# Patient Record
Sex: FEMALE | Race: BLACK OR AFRICAN AMERICAN | NOT HISPANIC OR LATINO
[De-identification: names, ages, dates, MRNs, and addresses within clinical notes are randomized per-mention and may not be internally consistent; named-entity substitution may affect disease eponyms.]

---

## 2017-09-12 ENCOUNTER — HOSPITAL ENCOUNTER (OUTPATIENT)
Dept: LAB | Age: 27
Setting detail: SPECIMEN
Discharge: HOME | End: 2017-09-12

## 2017-09-12 ENCOUNTER — PRENATAL OFFICE VISIT - HEALTHEAST (OUTPATIENT)
Dept: FAMILY MEDICINE | Facility: CLINIC | Age: 27
End: 2017-09-12

## 2017-09-12 ENCOUNTER — HOSPITAL ENCOUNTER (OUTPATIENT)
Dept: ULTRASOUND IMAGING | Facility: CLINIC | Age: 27
Discharge: HOME | End: 2017-09-12
Attending: PHYSICIAN ASSISTANT

## 2017-09-12 ENCOUNTER — COMMUNICATION - HEALTHEAST (OUTPATIENT)
Dept: TELEHEALTH | Facility: CLINIC | Age: 27
End: 2017-09-12

## 2017-09-12 DIAGNOSIS — Z34.00 NORMAL PREGNANCY, FIRST: ICD-10-CM

## 2017-09-12 DIAGNOSIS — N92.6 ABNORMAL MENSES: ICD-10-CM

## 2017-09-12 DIAGNOSIS — F43.21 ADJUSTMENT DISORDER WITH DEPRESSED MOOD: ICD-10-CM

## 2017-09-12 DIAGNOSIS — Z32.01 POSITIVE PREGNANCY TEST: ICD-10-CM

## 2017-09-12 LAB — HIV 1+2 AB+HIV1 P24 AG SERPL QL IA: NEGATIVE

## 2017-09-12 ASSESSMENT — MIFFLIN-ST. JEOR: SCORE: 1395.35

## 2017-09-13 LAB
HBV SURFACE AG SERPL QL IA: NEGATIVE
HEPATITIS B SURFACE ANTIBODY LHE- HISTORICAL: POSITIVE
SYPHILIS RPR SCREEN - HISTORICAL: NORMAL

## 2017-09-14 ENCOUNTER — COMMUNICATION - HEALTHEAST (OUTPATIENT)
Dept: FAMILY MEDICINE | Facility: CLINIC | Age: 27
End: 2017-09-14

## 2017-09-27 ENCOUNTER — COMMUNICATION - HEALTHEAST (OUTPATIENT)
Dept: FAMILY MEDICINE | Facility: CLINIC | Age: 27
End: 2017-09-27

## 2017-10-06 ENCOUNTER — COMMUNICATION - HEALTHEAST (OUTPATIENT)
Dept: FAMILY MEDICINE | Facility: CLINIC | Age: 27
End: 2017-10-06

## 2017-10-11 ENCOUNTER — PRENATAL OFFICE VISIT - HEALTHEAST (OUTPATIENT)
Dept: FAMILY MEDICINE | Facility: CLINIC | Age: 27
End: 2017-10-11

## 2017-10-11 DIAGNOSIS — N89.8 VAGINAL DISCHARGE DURING PREGNANCY IN SECOND TRIMESTER: ICD-10-CM

## 2017-10-11 DIAGNOSIS — R35.0 FREQUENCY OF URINATION: ICD-10-CM

## 2017-10-11 DIAGNOSIS — O26.892 VAGINAL DISCHARGE DURING PREGNANCY IN SECOND TRIMESTER: ICD-10-CM

## 2017-10-11 DIAGNOSIS — Z34.90 ENCOUNTER FOR SUPERVISION OF NORMAL PREGNANCY: ICD-10-CM

## 2017-10-11 ASSESSMENT — MIFFLIN-ST. JEOR: SCORE: 1429.08

## 2017-10-12 ENCOUNTER — COMMUNICATION - HEALTHEAST (OUTPATIENT)
Dept: BEHAVIORAL HEALTH | Facility: CLINIC | Age: 27
End: 2017-10-12

## 2017-10-12 ENCOUNTER — COMMUNICATION - HEALTHEAST (OUTPATIENT)
Dept: FAMILY MEDICINE | Facility: CLINIC | Age: 27
End: 2017-10-12

## 2017-10-12 DIAGNOSIS — N76.0 BV (BACTERIAL VAGINOSIS): ICD-10-CM

## 2017-10-12 DIAGNOSIS — B96.89 BV (BACTERIAL VAGINOSIS): ICD-10-CM

## 2017-10-12 DIAGNOSIS — B37.31 YEAST VAGINITIS: ICD-10-CM

## 2017-10-13 ENCOUNTER — OFFICE VISIT - HEALTHEAST (OUTPATIENT)
Dept: BEHAVIORAL HEALTH | Facility: CLINIC | Age: 27
End: 2017-10-13

## 2017-10-13 DIAGNOSIS — F43.23 ADJUSTMENT DISORDER WITH MIXED ANXIETY AND DEPRESSED MOOD: ICD-10-CM

## 2017-10-18 ENCOUNTER — HOSPITAL ENCOUNTER (OUTPATIENT)
Dept: ULTRASOUND IMAGING | Facility: CLINIC | Age: 27
Discharge: HOME | End: 2017-10-18

## 2017-10-18 LAB
BKR LAB AP ABNORMAL BLEEDING: NO
BKR LAB AP BIRTH CONTROL/HORMONES: NORMAL
BKR LAB AP CERVICAL APPEARANCE: NORMAL
BKR LAB AP GYN ADEQUACY: NORMAL
BKR LAB AP GYN INTERPRETATION: NORMAL
BKR LAB AP HPV REFLEX: NO
BKR LAB AP LMP: NORMAL
BKR LAB AP PATIENT STATUS: NORMAL
BKR LAB AP PREVIOUS ABNORMAL: NORMAL
BKR LAB AP PREVIOUS NORMAL: 2009
HIGH RISK?: NO
PATH REPORT.COMMENTS IMP SPEC: NORMAL
PATH REPORT.COMMENTS IMP SPEC: NORMAL
RESULT FLAG (HE HISTORICAL CONVERSION): NORMAL

## 2017-10-24 ENCOUNTER — OFFICE VISIT - HEALTHEAST (OUTPATIENT)
Dept: BEHAVIORAL HEALTH | Facility: CLINIC | Age: 27
End: 2017-10-24

## 2017-10-24 DIAGNOSIS — F43.23 ADJUSTMENT DISORDER WITH MIXED ANXIETY AND DEPRESSED MOOD: ICD-10-CM

## 2017-10-30 ENCOUNTER — AMBULATORY - HEALTHEAST (OUTPATIENT)
Dept: BEHAVIORAL HEALTH | Facility: CLINIC | Age: 27
End: 2017-10-30

## 2017-11-01 ENCOUNTER — COMMUNICATION - HEALTHEAST (OUTPATIENT)
Dept: NURSING | Facility: CLINIC | Age: 27
End: 2017-11-01

## 2017-11-02 ENCOUNTER — OFFICE VISIT - HEALTHEAST (OUTPATIENT)
Dept: BEHAVIORAL HEALTH | Facility: CLINIC | Age: 27
End: 2017-11-02

## 2017-11-02 ENCOUNTER — COMMUNICATION - HEALTHEAST (OUTPATIENT)
Dept: NURSING | Facility: CLINIC | Age: 27
End: 2017-11-02

## 2017-11-02 DIAGNOSIS — F43.23 ADJUSTMENT DISORDER WITH MIXED ANXIETY AND DEPRESSED MOOD: ICD-10-CM

## 2017-11-08 ENCOUNTER — PRENATAL OFFICE VISIT - HEALTHEAST (OUTPATIENT)
Dept: FAMILY MEDICINE | Facility: CLINIC | Age: 27
End: 2017-11-08

## 2017-11-08 ENCOUNTER — OFFICE VISIT - HEALTHEAST (OUTPATIENT)
Dept: BEHAVIORAL HEALTH | Facility: CLINIC | Age: 27
End: 2017-11-08

## 2017-11-08 DIAGNOSIS — T76.21XA: ICD-10-CM

## 2017-11-08 DIAGNOSIS — Z3A.23 23 WEEKS GESTATION OF PREGNANCY: ICD-10-CM

## 2017-11-08 DIAGNOSIS — F43.23 ADJUSTMENT DISORDER WITH MIXED ANXIETY AND DEPRESSED MOOD: ICD-10-CM

## 2017-11-08 DIAGNOSIS — F51.04 PSYCHOPHYSIOLOGICAL INSOMNIA: ICD-10-CM

## 2017-11-08 DIAGNOSIS — Z34.92 ENCOUNTER FOR SUPERVISION OF NORMAL PREGNANCY IN SECOND TRIMESTER: ICD-10-CM

## 2017-11-14 ENCOUNTER — COMMUNICATION - HEALTHEAST (OUTPATIENT)
Dept: NURSING | Facility: CLINIC | Age: 27
End: 2017-11-14

## 2017-11-15 ENCOUNTER — COMMUNICATION - HEALTHEAST (OUTPATIENT)
Dept: NURSING | Facility: CLINIC | Age: 27
End: 2017-11-15

## 2017-11-15 ENCOUNTER — AMBULATORY - HEALTHEAST (OUTPATIENT)
Dept: CARE COORDINATION | Facility: CLINIC | Age: 27
End: 2017-11-15

## 2017-11-15 DIAGNOSIS — Z71.89 COUNSELING AND COORDINATION OF CARE: ICD-10-CM

## 2017-11-17 ENCOUNTER — OFFICE VISIT - HEALTHEAST (OUTPATIENT)
Dept: BEHAVIORAL HEALTH | Facility: CLINIC | Age: 27
End: 2017-11-17

## 2017-11-17 DIAGNOSIS — F43.23 ADJUSTMENT DISORDER WITH MIXED ANXIETY AND DEPRESSED MOOD: ICD-10-CM

## 2017-11-20 ENCOUNTER — COMMUNICATION - HEALTHEAST (OUTPATIENT)
Dept: NURSING | Facility: CLINIC | Age: 27
End: 2017-11-20

## 2017-11-21 ENCOUNTER — COMMUNICATION - HEALTHEAST (OUTPATIENT)
Dept: NURSING | Facility: CLINIC | Age: 27
End: 2017-11-21

## 2017-11-21 ENCOUNTER — PRENATAL OFFICE VISIT - HEALTHEAST (OUTPATIENT)
Dept: FAMILY MEDICINE | Facility: CLINIC | Age: 27
End: 2017-11-21

## 2017-11-21 DIAGNOSIS — Z34.92 SECOND TRIMESTER PREGNANCY: ICD-10-CM

## 2017-11-21 DIAGNOSIS — Z71.89 COUNSELING AND COORDINATION OF CARE: ICD-10-CM

## 2017-11-28 ENCOUNTER — COMMUNICATION - HEALTHEAST (OUTPATIENT)
Dept: NURSING | Facility: CLINIC | Age: 27
End: 2017-11-28

## 2017-12-01 ENCOUNTER — OFFICE VISIT - HEALTHEAST (OUTPATIENT)
Dept: BEHAVIORAL HEALTH | Facility: CLINIC | Age: 27
End: 2017-12-01

## 2017-12-01 DIAGNOSIS — F43.23 ADJUSTMENT DISORDER WITH MIXED ANXIETY AND DEPRESSED MOOD: ICD-10-CM

## 2017-12-07 ENCOUNTER — COMMUNICATION - HEALTHEAST (OUTPATIENT)
Dept: NURSING | Facility: CLINIC | Age: 27
End: 2017-12-07

## 2017-12-11 ENCOUNTER — PRENATAL OFFICE VISIT - HEALTHEAST (OUTPATIENT)
Dept: FAMILY MEDICINE | Facility: CLINIC | Age: 27
End: 2017-12-11

## 2017-12-11 ENCOUNTER — COMMUNICATION - HEALTHEAST (OUTPATIENT)
Dept: NURSING | Facility: CLINIC | Age: 27
End: 2017-12-11

## 2017-12-11 DIAGNOSIS — Z34.93 ENCOUNTER FOR SUPERVISION OF NORMAL PREGNANCY IN THIRD TRIMESTER: ICD-10-CM

## 2017-12-12 ENCOUNTER — AMBULATORY - HEALTHEAST (OUTPATIENT)
Dept: CARE COORDINATION | Facility: CLINIC | Age: 27
End: 2017-12-12

## 2017-12-12 LAB — SYPHILIS RPR SCREEN - HISTORICAL: NORMAL

## 2017-12-15 ENCOUNTER — OFFICE VISIT - HEALTHEAST (OUTPATIENT)
Dept: BEHAVIORAL HEALTH | Facility: CLINIC | Age: 27
End: 2017-12-15

## 2017-12-15 DIAGNOSIS — F43.10 PTSD (POST-TRAUMATIC STRESS DISORDER): ICD-10-CM

## 2017-12-27 ENCOUNTER — COMMUNICATION - HEALTHEAST (OUTPATIENT)
Dept: NURSING | Facility: CLINIC | Age: 27
End: 2017-12-27

## 2017-12-27 ENCOUNTER — PRENATAL OFFICE VISIT - HEALTHEAST (OUTPATIENT)
Dept: FAMILY MEDICINE | Facility: CLINIC | Age: 27
End: 2017-12-27

## 2017-12-27 DIAGNOSIS — Z34.03 ENCOUNTER FOR SUPERVISION OF NORMAL FIRST PREGNANCY IN THIRD TRIMESTER: ICD-10-CM

## 2017-12-28 ENCOUNTER — COMMUNICATION - HEALTHEAST (OUTPATIENT)
Dept: FAMILY MEDICINE | Facility: CLINIC | Age: 27
End: 2017-12-28

## 2017-12-29 ENCOUNTER — AMBULATORY - HEALTHEAST (OUTPATIENT)
Dept: NURSING | Facility: CLINIC | Age: 27
End: 2017-12-29

## 2017-12-29 ENCOUNTER — OFFICE VISIT - HEALTHEAST (OUTPATIENT)
Dept: BEHAVIORAL HEALTH | Facility: CLINIC | Age: 27
End: 2017-12-29

## 2017-12-29 DIAGNOSIS — F43.10 PTSD (POST-TRAUMATIC STRESS DISORDER): ICD-10-CM

## 2017-12-29 DIAGNOSIS — F43.23 ADJUSTMENT DISORDER WITH MIXED ANXIETY AND DEPRESSED MOOD: ICD-10-CM

## 2018-01-10 ENCOUNTER — COMMUNICATION - HEALTHEAST (OUTPATIENT)
Dept: NURSING | Facility: CLINIC | Age: 28
End: 2018-01-10

## 2018-01-10 DIAGNOSIS — Z71.89 COUNSELING AND COORDINATION OF CARE: ICD-10-CM

## 2018-01-12 ENCOUNTER — OFFICE VISIT - HEALTHEAST (OUTPATIENT)
Dept: BEHAVIORAL HEALTH | Facility: CLINIC | Age: 28
End: 2018-01-12

## 2018-01-12 ENCOUNTER — PRENATAL OFFICE VISIT - HEALTHEAST (OUTPATIENT)
Dept: FAMILY MEDICINE | Facility: CLINIC | Age: 28
End: 2018-01-12

## 2018-01-12 DIAGNOSIS — F43.10 PTSD (POST-TRAUMATIC STRESS DISORDER): ICD-10-CM

## 2018-01-12 DIAGNOSIS — F51.04 PSYCHOPHYSIOLOGICAL INSOMNIA: ICD-10-CM

## 2018-01-12 DIAGNOSIS — Z34.93 THIRD TRIMESTER PREGNANCY: ICD-10-CM

## 2018-01-12 DIAGNOSIS — Z3A.23 23 WEEKS GESTATION OF PREGNANCY: ICD-10-CM

## 2018-01-12 DIAGNOSIS — F43.23 ADJUSTMENT DISORDER WITH MIXED ANXIETY AND DEPRESSED MOOD: ICD-10-CM

## 2018-01-12 LAB
ALBUMIN UR-MCNC: ABNORMAL MG/DL
GLUCOSE UR STRIP-MCNC: NEGATIVE MG/DL
KETONES UR STRIP-MCNC: NEGATIVE MG/DL

## 2018-01-19 ENCOUNTER — COMMUNICATION - HEALTHEAST (OUTPATIENT)
Dept: NURSING | Facility: CLINIC | Age: 28
End: 2018-01-19

## 2018-01-19 ENCOUNTER — COMMUNICATION - HEALTHEAST (OUTPATIENT)
Dept: FAMILY MEDICINE | Facility: CLINIC | Age: 28
End: 2018-01-19

## 2018-01-23 ENCOUNTER — COMMUNICATION - HEALTHEAST (OUTPATIENT)
Dept: FAMILY MEDICINE | Facility: CLINIC | Age: 28
End: 2018-01-23

## 2018-01-23 ENCOUNTER — COMMUNICATION - HEALTHEAST (OUTPATIENT)
Dept: BEHAVIORAL HEALTH | Facility: CLINIC | Age: 28
End: 2018-01-23

## 2018-01-31 ENCOUNTER — PRENATAL OFFICE VISIT - HEALTHEAST (OUTPATIENT)
Dept: FAMILY MEDICINE | Facility: CLINIC | Age: 28
End: 2018-01-31

## 2018-01-31 DIAGNOSIS — Z34.90 SUPERVISION OF NORMAL PREGNANCY: ICD-10-CM

## 2018-01-31 LAB
ALBUMIN UR-MCNC: ABNORMAL MG/DL
APPEARANCE UR: CLEAR
BILIRUB UR QL STRIP: NEGATIVE
COLOR UR AUTO: YELLOW
GLUCOSE UR STRIP-MCNC: NEGATIVE MG/DL
HGB UR QL STRIP: ABNORMAL
KETONES UR STRIP-MCNC: NEGATIVE MG/DL
LEUKOCYTE ESTERASE UR QL STRIP: ABNORMAL
NITRATE UR QL: NEGATIVE
PH UR STRIP: 8.5 [PH] (ref 5–8)
SP GR UR STRIP: 1.01 (ref 1–1.03)
UROBILINOGEN UR STRIP-ACNC: ABNORMAL

## 2018-02-01 LAB
ALLERGIC TO PENICILLIN: NO
GP B STREP DNA SPEC QL NAA+PROBE: NEGATIVE

## 2018-02-06 ENCOUNTER — COMMUNICATION - HEALTHEAST (OUTPATIENT)
Dept: NURSING | Facility: CLINIC | Age: 28
End: 2018-02-06

## 2018-02-09 ENCOUNTER — COMMUNICATION - HEALTHEAST (OUTPATIENT)
Dept: NURSING | Facility: CLINIC | Age: 28
End: 2018-02-09

## 2018-02-09 ENCOUNTER — OFFICE VISIT - HEALTHEAST (OUTPATIENT)
Dept: BEHAVIORAL HEALTH | Facility: CLINIC | Age: 28
End: 2018-02-09

## 2018-02-09 ENCOUNTER — AMBULATORY - HEALTHEAST (OUTPATIENT)
Dept: BEHAVIORAL HEALTH | Facility: CLINIC | Age: 28
End: 2018-02-09

## 2018-02-09 DIAGNOSIS — F43.10 PTSD (POST-TRAUMATIC STRESS DISORDER): ICD-10-CM

## 2018-02-16 ENCOUNTER — PRENATAL OFFICE VISIT - HEALTHEAST (OUTPATIENT)
Dept: FAMILY MEDICINE | Facility: CLINIC | Age: 28
End: 2018-02-16

## 2018-02-16 DIAGNOSIS — Z34.03 ENCOUNTER FOR SUPERVISION OF NORMAL FIRST PREGNANCY IN THIRD TRIMESTER: ICD-10-CM

## 2018-02-16 LAB
ALBUMIN UR-MCNC: NEGATIVE MG/DL
GLUCOSE UR STRIP-MCNC: NEGATIVE MG/DL
KETONES UR STRIP-MCNC: NEGATIVE MG/DL

## 2018-02-20 ENCOUNTER — HOSPITAL ENCOUNTER (OUTPATIENT)
Dept: ULTRASOUND IMAGING | Facility: CLINIC | Age: 28
Discharge: HOME | End: 2018-02-20
Attending: FAMILY MEDICINE

## 2018-02-21 ENCOUNTER — AMBULATORY - HEALTHEAST (OUTPATIENT)
Dept: NURSING | Facility: CLINIC | Age: 28
End: 2018-02-21

## 2018-02-21 ENCOUNTER — PRENATAL OFFICE VISIT - HEALTHEAST (OUTPATIENT)
Dept: FAMILY MEDICINE | Facility: CLINIC | Age: 28
End: 2018-02-21

## 2018-02-21 ENCOUNTER — OFFICE VISIT - HEALTHEAST (OUTPATIENT)
Dept: BEHAVIORAL HEALTH | Facility: CLINIC | Age: 28
End: 2018-02-21

## 2018-02-21 DIAGNOSIS — F43.10 PTSD (POST-TRAUMATIC STRESS DISORDER): ICD-10-CM

## 2018-02-21 DIAGNOSIS — Z34.03 ENCOUNTER FOR SUPERVISION OF NORMAL FIRST PREGNANCY IN THIRD TRIMESTER: ICD-10-CM

## 2018-02-23 ENCOUNTER — COMMUNICATION - HEALTHEAST (OUTPATIENT)
Dept: CARE COORDINATION | Facility: CLINIC | Age: 28
End: 2018-02-23

## 2018-02-28 ENCOUNTER — COMMUNICATION - HEALTHEAST (OUTPATIENT)
Dept: NURSING | Facility: CLINIC | Age: 28
End: 2018-02-28

## 2018-02-28 ENCOUNTER — OFFICE VISIT - HEALTHEAST (OUTPATIENT)
Dept: BEHAVIORAL HEALTH | Facility: CLINIC | Age: 28
End: 2018-02-28

## 2018-02-28 ENCOUNTER — PRENATAL OFFICE VISIT - HEALTHEAST (OUTPATIENT)
Dept: FAMILY MEDICINE | Facility: CLINIC | Age: 28
End: 2018-02-28

## 2018-02-28 DIAGNOSIS — F32.9 SINGLE CURRENT EPISODE OF MAJOR DEPRESSIVE DISORDER, UNSPECIFIED DEPRESSION EPISODE SEVERITY: ICD-10-CM

## 2018-02-28 DIAGNOSIS — F43.10 PTSD (POST-TRAUMATIC STRESS DISORDER): ICD-10-CM

## 2018-02-28 DIAGNOSIS — Z34.03 ENCOUNTER FOR SUPERVISION OF NORMAL FIRST PREGNANCY IN THIRD TRIMESTER: ICD-10-CM

## 2018-03-07 ENCOUNTER — HOSPITAL ENCOUNTER (OUTPATIENT)
Dept: OBGYN | Facility: HOSPITAL | Age: 28
Discharge: HOME | End: 2018-03-07
Attending: FAMILY MEDICINE | Admitting: FAMILY MEDICINE

## 2018-03-07 ENCOUNTER — OFFICE VISIT - HEALTHEAST (OUTPATIENT)
Dept: BEHAVIORAL HEALTH | Facility: CLINIC | Age: 28
End: 2018-03-07

## 2018-03-07 ENCOUNTER — COMMUNICATION - HEALTHEAST (OUTPATIENT)
Dept: NURSING | Facility: CLINIC | Age: 28
End: 2018-03-07

## 2018-03-07 ENCOUNTER — PRENATAL OFFICE VISIT - HEALTHEAST (OUTPATIENT)
Dept: FAMILY MEDICINE | Facility: CLINIC | Age: 28
End: 2018-03-07

## 2018-03-07 DIAGNOSIS — F32.9 SINGLE CURRENT EPISODE OF MAJOR DEPRESSIVE DISORDER, UNSPECIFIED DEPRESSION EPISODE SEVERITY: ICD-10-CM

## 2018-03-07 DIAGNOSIS — Z34.03 ENCOUNTER FOR SUPERVISION OF NORMAL FIRST PREGNANCY IN THIRD TRIMESTER: ICD-10-CM

## 2018-03-07 DIAGNOSIS — F43.10 PTSD (POST-TRAUMATIC STRESS DISORDER): ICD-10-CM

## 2018-03-07 ASSESSMENT — MIFFLIN-ST. JEOR: SCORE: 1554.11

## 2018-03-08 ENCOUNTER — COMMUNICATION - HEALTHEAST (OUTPATIENT)
Dept: NURSING | Facility: CLINIC | Age: 28
End: 2018-03-08

## 2018-03-08 ENCOUNTER — COMMUNICATION - HEALTHEAST (OUTPATIENT)
Dept: FAMILY MEDICINE | Facility: CLINIC | Age: 28
End: 2018-03-08

## 2018-03-13 ENCOUNTER — HOSPITAL ENCOUNTER (OUTPATIENT)
Dept: OBGYN | Facility: HOSPITAL | Age: 28
Discharge: HOME | End: 2018-03-13
Attending: FAMILY MEDICINE | Admitting: FAMILY MEDICINE

## 2018-03-13 ENCOUNTER — HOSPITAL ENCOUNTER (OUTPATIENT)
Dept: ULTRASOUND IMAGING | Facility: HOSPITAL | Age: 28
Discharge: HOME | End: 2018-03-13
Attending: FAMILY MEDICINE

## 2018-03-14 ENCOUNTER — COMMUNICATION - HEALTHEAST (OUTPATIENT)
Dept: CARE COORDINATION | Facility: CLINIC | Age: 28
End: 2018-03-14

## 2018-03-14 ENCOUNTER — PRENATAL OFFICE VISIT - HEALTHEAST (OUTPATIENT)
Dept: FAMILY MEDICINE | Facility: CLINIC | Age: 28
End: 2018-03-14

## 2018-03-14 DIAGNOSIS — Z34.93 THIRD TRIMESTER PREGNANCY: ICD-10-CM

## 2018-03-16 ENCOUNTER — HOSPITAL ENCOUNTER (OUTPATIENT)
Dept: ULTRASOUND IMAGING | Facility: HOSPITAL | Age: 28
Discharge: HOME | End: 2018-03-16
Attending: FAMILY MEDICINE

## 2018-03-16 ENCOUNTER — HOSPITAL ENCOUNTER (OUTPATIENT)
Dept: OBGYN | Facility: HOSPITAL | Age: 28
Discharge: HOME | End: 2018-03-16
Attending: FAMILY MEDICINE | Admitting: FAMILY MEDICINE

## 2018-03-16 DIAGNOSIS — Z34.93 THIRD TRIMESTER PREGNANCY: ICD-10-CM

## 2018-03-19 ENCOUNTER — AMBULATORY - HEALTHEAST (OUTPATIENT)
Dept: NURSING | Facility: CLINIC | Age: 28
End: 2018-03-19

## 2018-03-22 ENCOUNTER — HOME CARE/HOSPICE - HEALTHEAST (OUTPATIENT)
Dept: HOME HEALTH SERVICES | Facility: HOME HEALTH | Age: 28
End: 2018-03-22

## 2018-03-23 ENCOUNTER — AMBULATORY - HEALTHEAST (OUTPATIENT)
Dept: NURSING | Facility: CLINIC | Age: 28
End: 2018-03-23

## 2018-03-23 ENCOUNTER — COMMUNICATION - HEALTHEAST (OUTPATIENT)
Dept: NURSING | Facility: CLINIC | Age: 28
End: 2018-03-23

## 2018-03-24 ENCOUNTER — HOME CARE/HOSPICE - HEALTHEAST (OUTPATIENT)
Dept: HOME HEALTH SERVICES | Facility: HOME HEALTH | Age: 28
End: 2018-03-24

## 2018-03-27 ENCOUNTER — COMMUNICATION - HEALTHEAST (OUTPATIENT)
Dept: FAMILY MEDICINE | Facility: CLINIC | Age: 28
End: 2018-03-27

## 2018-03-28 ENCOUNTER — OFFICE VISIT - HEALTHEAST (OUTPATIENT)
Dept: BEHAVIORAL HEALTH | Facility: CLINIC | Age: 28
End: 2018-03-28

## 2018-03-28 ENCOUNTER — OFFICE VISIT - HEALTHEAST (OUTPATIENT)
Dept: FAMILY MEDICINE | Facility: CLINIC | Age: 28
End: 2018-03-28

## 2018-03-28 DIAGNOSIS — F43.10 PTSD (POST-TRAUMATIC STRESS DISORDER): ICD-10-CM

## 2018-03-28 DIAGNOSIS — G56.00 CARPAL TUNNEL SYNDROME: ICD-10-CM

## 2018-03-28 DIAGNOSIS — N76.0 VAGINITIS: ICD-10-CM

## 2018-03-28 LAB
CLUE CELLS: NORMAL
TRICHOMONAS, WET PREP: NORMAL
YEAST, WET PREP: NORMAL

## 2018-03-28 ASSESSMENT — MIFFLIN-ST. JEOR: SCORE: 1517.82

## 2018-03-29 LAB
C TRACH DNA SPEC QL PROBE+SIG AMP: NEGATIVE
N GONORRHOEA DNA SPEC QL NAA+PROBE: NEGATIVE

## 2018-03-31 LAB — BACTERIA SPEC CULT: NORMAL

## 2018-04-06 ENCOUNTER — RECORDS - HEALTHEAST (OUTPATIENT)
Dept: ADMINISTRATIVE | Facility: OTHER | Age: 28
End: 2018-04-06

## 2018-04-11 ENCOUNTER — COMMUNICATION - HEALTHEAST (OUTPATIENT)
Dept: FAMILY MEDICINE | Facility: CLINIC | Age: 28
End: 2018-04-11

## 2018-04-18 ENCOUNTER — COMMUNICATION - HEALTHEAST (OUTPATIENT)
Dept: NURSING | Facility: CLINIC | Age: 28
End: 2018-04-18

## 2018-04-20 ENCOUNTER — COMMUNICATION - HEALTHEAST (OUTPATIENT)
Dept: NURSING | Facility: CLINIC | Age: 28
End: 2018-04-20

## 2018-04-20 ENCOUNTER — AMBULATORY - HEALTHEAST (OUTPATIENT)
Dept: NURSING | Facility: CLINIC | Age: 28
End: 2018-04-20

## 2018-04-20 ENCOUNTER — OFFICE VISIT - HEALTHEAST (OUTPATIENT)
Dept: FAMILY MEDICINE | Facility: CLINIC | Age: 28
End: 2018-04-20

## 2018-04-20 ENCOUNTER — OFFICE VISIT - HEALTHEAST (OUTPATIENT)
Dept: BEHAVIORAL HEALTH | Facility: CLINIC | Age: 28
End: 2018-04-20

## 2018-04-20 DIAGNOSIS — G56.03 BILATERAL CARPAL TUNNEL SYNDROME: ICD-10-CM

## 2018-04-20 DIAGNOSIS — F32.9 SINGLE CURRENT EPISODE OF MAJOR DEPRESSIVE DISORDER, UNSPECIFIED DEPRESSION EPISODE SEVERITY: ICD-10-CM

## 2018-04-20 DIAGNOSIS — F43.10 PTSD (POST-TRAUMATIC STRESS DISORDER): ICD-10-CM

## 2018-04-20 ASSESSMENT — MIFFLIN-ST. JEOR: SCORE: 1490.6

## 2018-04-24 ENCOUNTER — AMBULATORY - HEALTHEAST (OUTPATIENT)
Dept: NURSING | Facility: CLINIC | Age: 28
End: 2018-04-24

## 2018-05-09 ENCOUNTER — COMMUNICATION - HEALTHEAST (OUTPATIENT)
Dept: FAMILY MEDICINE | Facility: CLINIC | Age: 28
End: 2018-05-09

## 2018-05-11 ENCOUNTER — OFFICE VISIT - HEALTHEAST (OUTPATIENT)
Dept: BEHAVIORAL HEALTH | Facility: CLINIC | Age: 28
End: 2018-05-11

## 2018-05-11 ENCOUNTER — OFFICE VISIT - HEALTHEAST (OUTPATIENT)
Dept: FAMILY MEDICINE | Facility: CLINIC | Age: 28
End: 2018-05-11

## 2018-05-11 ENCOUNTER — COMMUNICATION - HEALTHEAST (OUTPATIENT)
Dept: NURSING | Facility: CLINIC | Age: 28
End: 2018-05-11

## 2018-05-11 ENCOUNTER — AMBULATORY - HEALTHEAST (OUTPATIENT)
Dept: BEHAVIORAL HEALTH | Facility: CLINIC | Age: 28
End: 2018-05-11

## 2018-05-11 DIAGNOSIS — F43.10 PTSD (POST-TRAUMATIC STRESS DISORDER): ICD-10-CM

## 2018-05-11 ASSESSMENT — MIFFLIN-ST. JEOR: SCORE: 1477.88

## 2018-05-17 ENCOUNTER — COMMUNICATION - HEALTHEAST (OUTPATIENT)
Dept: FAMILY MEDICINE | Facility: CLINIC | Age: 28
End: 2018-05-17

## 2018-05-25 ENCOUNTER — COMMUNICATION - HEALTHEAST (OUTPATIENT)
Dept: NURSING | Facility: CLINIC | Age: 28
End: 2018-05-25

## 2018-05-31 ENCOUNTER — OFFICE VISIT - HEALTHEAST (OUTPATIENT)
Dept: BEHAVIORAL HEALTH | Facility: CLINIC | Age: 28
End: 2018-05-31

## 2018-05-31 ENCOUNTER — COMMUNICATION - HEALTHEAST (OUTPATIENT)
Dept: NURSING | Facility: CLINIC | Age: 28
End: 2018-05-31

## 2018-05-31 DIAGNOSIS — F32.9 SINGLE CURRENT EPISODE OF MAJOR DEPRESSIVE DISORDER, UNSPECIFIED DEPRESSION EPISODE SEVERITY: ICD-10-CM

## 2018-05-31 DIAGNOSIS — F43.10 PTSD (POST-TRAUMATIC STRESS DISORDER): ICD-10-CM

## 2018-05-31 DIAGNOSIS — Z71.89 COUNSELING AND COORDINATION OF CARE: ICD-10-CM

## 2018-06-13 ENCOUNTER — AMBULATORY - HEALTHEAST (OUTPATIENT)
Dept: BEHAVIORAL HEALTH | Facility: CLINIC | Age: 28
End: 2018-06-13

## 2018-06-14 ENCOUNTER — COMMUNICATION - HEALTHEAST (OUTPATIENT)
Dept: NURSING | Facility: CLINIC | Age: 28
End: 2018-06-14

## 2018-06-14 ENCOUNTER — OFFICE VISIT - HEALTHEAST (OUTPATIENT)
Dept: BEHAVIORAL HEALTH | Facility: CLINIC | Age: 28
End: 2018-06-14

## 2018-06-14 DIAGNOSIS — F43.10 PTSD (POST-TRAUMATIC STRESS DISORDER): ICD-10-CM

## 2018-06-14 DIAGNOSIS — F32.9 SINGLE CURRENT EPISODE OF MAJOR DEPRESSIVE DISORDER, UNSPECIFIED DEPRESSION EPISODE SEVERITY: ICD-10-CM

## 2018-06-21 ENCOUNTER — COMMUNICATION - HEALTHEAST (OUTPATIENT)
Dept: NURSING | Facility: CLINIC | Age: 28
End: 2018-06-21

## 2018-06-21 ENCOUNTER — COMMUNICATION - HEALTHEAST (OUTPATIENT)
Dept: FAMILY MEDICINE | Facility: CLINIC | Age: 28
End: 2018-06-21

## 2018-06-29 ENCOUNTER — OFFICE VISIT - HEALTHEAST (OUTPATIENT)
Dept: BEHAVIORAL HEALTH | Facility: CLINIC | Age: 28
End: 2018-06-29

## 2018-06-29 ENCOUNTER — COMMUNICATION - HEALTHEAST (OUTPATIENT)
Dept: NURSING | Facility: CLINIC | Age: 28
End: 2018-06-29

## 2018-06-29 DIAGNOSIS — F32.9 SINGLE CURRENT EPISODE OF MAJOR DEPRESSIVE DISORDER, UNSPECIFIED DEPRESSION EPISODE SEVERITY: ICD-10-CM

## 2018-06-29 DIAGNOSIS — F43.10 PTSD (POST-TRAUMATIC STRESS DISORDER): ICD-10-CM

## 2018-07-23 ENCOUNTER — COMMUNICATION - HEALTHEAST (OUTPATIENT)
Dept: NURSING | Facility: CLINIC | Age: 28
End: 2018-07-23

## 2018-07-23 ENCOUNTER — OFFICE VISIT - HEALTHEAST (OUTPATIENT)
Dept: BEHAVIORAL HEALTH | Facility: CLINIC | Age: 28
End: 2018-07-23

## 2018-07-23 DIAGNOSIS — F32.1 MODERATE SINGLE CURRENT EPISODE OF MAJOR DEPRESSIVE DISORDER (H): ICD-10-CM

## 2018-07-23 DIAGNOSIS — F43.10 PTSD (POST-TRAUMATIC STRESS DISORDER): ICD-10-CM

## 2018-07-23 DIAGNOSIS — Z71.89 COUNSELING AND COORDINATION OF CARE: ICD-10-CM

## 2018-08-06 ENCOUNTER — OFFICE VISIT - HEALTHEAST (OUTPATIENT)
Dept: BEHAVIORAL HEALTH | Facility: CLINIC | Age: 28
End: 2018-08-06

## 2018-08-06 ENCOUNTER — COMMUNICATION - HEALTHEAST (OUTPATIENT)
Dept: NURSING | Facility: CLINIC | Age: 28
End: 2018-08-06

## 2018-08-06 DIAGNOSIS — F32.1 MODERATE SINGLE CURRENT EPISODE OF MAJOR DEPRESSIVE DISORDER (H): ICD-10-CM

## 2018-08-06 DIAGNOSIS — F43.10 PTSD (POST-TRAUMATIC STRESS DISORDER): ICD-10-CM

## 2018-08-06 DIAGNOSIS — Z71.89 COUNSELING AND COORDINATION OF CARE: ICD-10-CM

## 2018-08-13 ENCOUNTER — COMMUNICATION - HEALTHEAST (OUTPATIENT)
Dept: CARE COORDINATION | Facility: CLINIC | Age: 28
End: 2018-08-13

## 2018-08-20 ENCOUNTER — AMBULATORY - HEALTHEAST (OUTPATIENT)
Dept: BEHAVIORAL HEALTH | Facility: CLINIC | Age: 28
End: 2018-08-20

## 2018-08-20 ENCOUNTER — OFFICE VISIT - HEALTHEAST (OUTPATIENT)
Dept: BEHAVIORAL HEALTH | Facility: CLINIC | Age: 28
End: 2018-08-20

## 2018-08-20 DIAGNOSIS — F43.10 PTSD (POST-TRAUMATIC STRESS DISORDER): ICD-10-CM

## 2018-08-20 DIAGNOSIS — F32.1 MODERATE SINGLE CURRENT EPISODE OF MAJOR DEPRESSIVE DISORDER (H): ICD-10-CM

## 2018-08-30 ENCOUNTER — COMMUNICATION - HEALTHEAST (OUTPATIENT)
Dept: NURSING | Facility: CLINIC | Age: 28
End: 2018-08-30

## 2018-09-13 ENCOUNTER — COMMUNICATION - HEALTHEAST (OUTPATIENT)
Dept: NURSING | Facility: CLINIC | Age: 28
End: 2018-09-13

## 2018-09-14 ENCOUNTER — AMBULATORY - HEALTHEAST (OUTPATIENT)
Dept: BEHAVIORAL HEALTH | Facility: CLINIC | Age: 28
End: 2018-09-14

## 2018-10-02 ENCOUNTER — COMMUNICATION - HEALTHEAST (OUTPATIENT)
Dept: NURSING | Facility: CLINIC | Age: 28
End: 2018-10-02

## 2018-11-13 ENCOUNTER — COMMUNICATION - HEALTHEAST (OUTPATIENT)
Dept: NURSING | Facility: CLINIC | Age: 28
End: 2018-11-13

## 2018-11-14 ENCOUNTER — COMMUNICATION - HEALTHEAST (OUTPATIENT)
Dept: NURSING | Facility: CLINIC | Age: 28
End: 2018-11-14

## 2018-11-14 ENCOUNTER — AMBULATORY - HEALTHEAST (OUTPATIENT)
Dept: CARE COORDINATION | Facility: CLINIC | Age: 28
End: 2018-11-14

## 2018-11-14 ENCOUNTER — OFFICE VISIT - HEALTHEAST (OUTPATIENT)
Dept: BEHAVIORAL HEALTH | Facility: CLINIC | Age: 28
End: 2018-11-14

## 2018-11-14 DIAGNOSIS — F32.1 MODERATE SINGLE CURRENT EPISODE OF MAJOR DEPRESSIVE DISORDER (H): ICD-10-CM

## 2018-11-14 DIAGNOSIS — F43.10 PTSD (POST-TRAUMATIC STRESS DISORDER): ICD-10-CM

## 2018-11-14 DIAGNOSIS — Z63.4 BEREAVEMENT, UNCOMPLICATED: ICD-10-CM

## 2018-11-14 SDOH — SOCIAL STABILITY - SOCIAL INSECURITY: DISSAPEARANCE AND DEATH OF FAMILY MEMBER: Z63.4

## 2018-12-05 ENCOUNTER — COMMUNICATION - HEALTHEAST (OUTPATIENT)
Dept: NURSING | Facility: CLINIC | Age: 28
End: 2018-12-05

## 2018-12-06 ENCOUNTER — AMBULATORY - HEALTHEAST (OUTPATIENT)
Dept: BEHAVIORAL HEALTH | Facility: CLINIC | Age: 28
End: 2018-12-06

## 2018-12-06 ENCOUNTER — OFFICE VISIT - HEALTHEAST (OUTPATIENT)
Dept: BEHAVIORAL HEALTH | Facility: CLINIC | Age: 28
End: 2018-12-06

## 2018-12-06 DIAGNOSIS — F32.1 MODERATE SINGLE CURRENT EPISODE OF MAJOR DEPRESSIVE DISORDER (H): ICD-10-CM

## 2018-12-06 DIAGNOSIS — F43.10 PTSD (POST-TRAUMATIC STRESS DISORDER): ICD-10-CM

## 2018-12-19 ENCOUNTER — OFFICE VISIT - HEALTHEAST (OUTPATIENT)
Dept: BEHAVIORAL HEALTH | Facility: CLINIC | Age: 28
End: 2018-12-19

## 2018-12-19 DIAGNOSIS — F43.10 PTSD (POST-TRAUMATIC STRESS DISORDER): ICD-10-CM

## 2018-12-19 DIAGNOSIS — F32.1 MODERATE SINGLE CURRENT EPISODE OF MAJOR DEPRESSIVE DISORDER (H): ICD-10-CM

## 2018-12-21 ENCOUNTER — COMMUNICATION - HEALTHEAST (OUTPATIENT)
Dept: NURSING | Facility: CLINIC | Age: 28
End: 2018-12-21

## 2019-01-23 ENCOUNTER — COMMUNICATION - HEALTHEAST (OUTPATIENT)
Dept: NURSING | Facility: CLINIC | Age: 29
End: 2019-01-23

## 2019-01-24 ENCOUNTER — COMMUNICATION - HEALTHEAST (OUTPATIENT)
Dept: NURSING | Facility: CLINIC | Age: 29
End: 2019-01-24

## 2019-01-31 ENCOUNTER — RECORDS - HEALTHEAST (OUTPATIENT)
Dept: ADMINISTRATIVE | Facility: OTHER | Age: 29
End: 2019-01-31

## 2019-02-01 ENCOUNTER — COMMUNICATION - HEALTHEAST (OUTPATIENT)
Dept: NURSING | Facility: CLINIC | Age: 29
End: 2019-02-01

## 2019-02-11 ENCOUNTER — COMMUNICATION - HEALTHEAST (OUTPATIENT)
Dept: NURSING | Facility: CLINIC | Age: 29
End: 2019-02-11

## 2019-03-06 ENCOUNTER — AMBULATORY - HEALTHEAST (OUTPATIENT)
Dept: BEHAVIORAL HEALTH | Facility: CLINIC | Age: 29
End: 2019-03-06

## 2019-03-06 ENCOUNTER — OFFICE VISIT - HEALTHEAST (OUTPATIENT)
Dept: BEHAVIORAL HEALTH | Facility: CLINIC | Age: 29
End: 2019-03-06

## 2019-03-06 DIAGNOSIS — F32.1 MODERATE SINGLE CURRENT EPISODE OF MAJOR DEPRESSIVE DISORDER (H): ICD-10-CM

## 2019-03-06 DIAGNOSIS — F43.10 PTSD (POST-TRAUMATIC STRESS DISORDER): ICD-10-CM

## 2019-03-21 ENCOUNTER — COMMUNICATION - HEALTHEAST (OUTPATIENT)
Dept: NURSING | Facility: CLINIC | Age: 29
End: 2019-03-21

## 2019-03-27 ENCOUNTER — OFFICE VISIT - HEALTHEAST (OUTPATIENT)
Dept: BEHAVIORAL HEALTH | Facility: CLINIC | Age: 29
End: 2019-03-27

## 2019-03-27 ENCOUNTER — COMMUNICATION - HEALTHEAST (OUTPATIENT)
Dept: BEHAVIORAL HEALTH | Facility: CLINIC | Age: 29
End: 2019-03-27

## 2019-03-27 DIAGNOSIS — F32.1 MODERATE SINGLE CURRENT EPISODE OF MAJOR DEPRESSIVE DISORDER (H): ICD-10-CM

## 2019-03-27 DIAGNOSIS — F43.10 PTSD (POST-TRAUMATIC STRESS DISORDER): ICD-10-CM

## 2019-04-17 ENCOUNTER — OFFICE VISIT - HEALTHEAST (OUTPATIENT)
Dept: BEHAVIORAL HEALTH | Facility: CLINIC | Age: 29
End: 2019-04-17

## 2019-04-17 DIAGNOSIS — F32.1 MODERATE SINGLE CURRENT EPISODE OF MAJOR DEPRESSIVE DISORDER (H): ICD-10-CM

## 2019-04-17 DIAGNOSIS — F43.10 PTSD (POST-TRAUMATIC STRESS DISORDER): ICD-10-CM

## 2019-04-23 ENCOUNTER — COMMUNICATION - HEALTHEAST (OUTPATIENT)
Dept: NURSING | Facility: CLINIC | Age: 29
End: 2019-04-23

## 2019-05-02 ENCOUNTER — COMMUNICATION - HEALTHEAST (OUTPATIENT)
Dept: NURSING | Facility: CLINIC | Age: 29
End: 2019-05-02

## 2019-05-08 ENCOUNTER — COMMUNICATION - HEALTHEAST (OUTPATIENT)
Dept: NURSING | Facility: CLINIC | Age: 29
End: 2019-05-08

## 2019-05-08 ENCOUNTER — OFFICE VISIT - HEALTHEAST (OUTPATIENT)
Dept: BEHAVIORAL HEALTH | Facility: CLINIC | Age: 29
End: 2019-05-08

## 2019-05-08 DIAGNOSIS — F32.1 MODERATE SINGLE CURRENT EPISODE OF MAJOR DEPRESSIVE DISORDER (H): ICD-10-CM

## 2019-05-08 DIAGNOSIS — F43.10 PTSD (POST-TRAUMATIC STRESS DISORDER): ICD-10-CM

## 2019-05-13 ENCOUNTER — COMMUNICATION - HEALTHEAST (OUTPATIENT)
Dept: NURSING | Facility: CLINIC | Age: 29
End: 2019-05-13

## 2019-05-15 ENCOUNTER — COMMUNICATION - HEALTHEAST (OUTPATIENT)
Dept: NURSING | Facility: CLINIC | Age: 29
End: 2019-05-15

## 2019-05-29 ENCOUNTER — COMMUNICATION - HEALTHEAST (OUTPATIENT)
Dept: NURSING | Facility: CLINIC | Age: 29
End: 2019-05-29

## 2019-06-17 ENCOUNTER — COMMUNICATION - HEALTHEAST (OUTPATIENT)
Dept: NURSING | Facility: CLINIC | Age: 29
End: 2019-06-17

## 2019-07-10 ENCOUNTER — COMMUNICATION - HEALTHEAST (OUTPATIENT)
Dept: TELEHEALTH | Facility: CLINIC | Age: 29
End: 2019-07-10

## 2019-07-17 ENCOUNTER — COMMUNICATION - HEALTHEAST (OUTPATIENT)
Dept: NURSING | Facility: CLINIC | Age: 29
End: 2019-07-17

## 2019-07-29 ENCOUNTER — COMMUNICATION - HEALTHEAST (OUTPATIENT)
Dept: NURSING | Facility: CLINIC | Age: 29
End: 2019-07-29

## 2019-08-28 ENCOUNTER — COMMUNICATION - HEALTHEAST (OUTPATIENT)
Dept: NURSING | Facility: CLINIC | Age: 29
End: 2019-08-28

## 2019-09-04 ENCOUNTER — COMMUNICATION - HEALTHEAST (OUTPATIENT)
Dept: CARE COORDINATION | Facility: CLINIC | Age: 29
End: 2019-09-04

## 2019-09-09 ENCOUNTER — COMMUNICATION - HEALTHEAST (OUTPATIENT)
Dept: NURSING | Facility: CLINIC | Age: 29
End: 2019-09-09

## 2019-09-18 ENCOUNTER — OFFICE VISIT - HEALTHEAST (OUTPATIENT)
Dept: FAMILY MEDICINE | Facility: CLINIC | Age: 29
End: 2019-09-18

## 2019-09-18 DIAGNOSIS — R76.11 POSITIVE SKIN TEST FOR TUBERCULOSIS: ICD-10-CM

## 2019-09-18 DIAGNOSIS — R42 DIZZINESS: ICD-10-CM

## 2019-09-18 DIAGNOSIS — Z00.00 ANNUAL PHYSICAL EXAM: ICD-10-CM

## 2019-09-18 DIAGNOSIS — F32.3 MAJOR DEPRESSIVE DISORDER, SINGLE EPISODE, SEVERE WITH PSYCHOTIC FEATURES (H): ICD-10-CM

## 2019-09-18 DIAGNOSIS — R63.0 POOR APPETITE: ICD-10-CM

## 2019-09-18 LAB
ALBUMIN SERPL-MCNC: 4.2 G/DL (ref 3.5–5)
ALBUMIN UR-MCNC: NEGATIVE MG/DL
ALP SERPL-CCNC: 99 U/L (ref 45–120)
ALT SERPL W P-5'-P-CCNC: 13 U/L (ref 0–45)
ANION GAP SERPL CALCULATED.3IONS-SCNC: 10 MMOL/L (ref 5–18)
APPEARANCE UR: CLEAR
AST SERPL W P-5'-P-CCNC: 18 U/L (ref 0–40)
BASOPHILS # BLD AUTO: 0 THOU/UL (ref 0–0.2)
BASOPHILS NFR BLD AUTO: 0 % (ref 0–2)
BILIRUB SERPL-MCNC: 0.5 MG/DL (ref 0–1)
BILIRUB UR QL STRIP: NEGATIVE
BUN SERPL-MCNC: 8 MG/DL (ref 8–22)
CALCIUM SERPL-MCNC: 9.8 MG/DL (ref 8.5–10.5)
CHLORIDE BLD-SCNC: 106 MMOL/L (ref 98–107)
CHOLEST SERPL-MCNC: 140 MG/DL
CO2 SERPL-SCNC: 22 MMOL/L (ref 22–31)
COLOR UR AUTO: YELLOW
CREAT SERPL-MCNC: 0.8 MG/DL (ref 0.6–1.1)
EOSINOPHIL # BLD AUTO: 0.1 THOU/UL (ref 0–0.4)
EOSINOPHIL NFR BLD AUTO: 1 % (ref 0–6)
ERYTHROCYTE [DISTWIDTH] IN BLOOD BY AUTOMATED COUNT: 12.2 % (ref 11–14.5)
FASTING STATUS PATIENT QL REPORTED: YES
GFR SERPL CREATININE-BSD FRML MDRD: >60 ML/MIN/1.73M2
GLUCOSE BLD-MCNC: 74 MG/DL (ref 70–125)
GLUCOSE UR STRIP-MCNC: NEGATIVE MG/DL
HCT VFR BLD AUTO: 41.7 % (ref 35–47)
HDLC SERPL-MCNC: 60 MG/DL
HGB BLD-MCNC: 13.7 G/DL (ref 12–16)
HGB UR QL STRIP: NEGATIVE
KETONES UR STRIP-MCNC: NEGATIVE MG/DL
LDLC SERPL CALC-MCNC: 64 MG/DL
LEUKOCYTE ESTERASE UR QL STRIP: NEGATIVE
LYMPHOCYTES # BLD AUTO: 1.7 THOU/UL (ref 0.8–4.4)
LYMPHOCYTES NFR BLD AUTO: 32 % (ref 20–40)
MCH RBC QN AUTO: 29.5 PG (ref 27–34)
MCHC RBC AUTO-ENTMCNC: 32.9 G/DL (ref 32–36)
MCV RBC AUTO: 90 FL (ref 80–100)
MONOCYTES # BLD AUTO: 0.3 THOU/UL (ref 0–0.9)
MONOCYTES NFR BLD AUTO: 5 % (ref 2–10)
NEUTROPHILS # BLD AUTO: 3.3 THOU/UL (ref 2–7.7)
NEUTROPHILS NFR BLD AUTO: 62 % (ref 50–70)
NITRATE UR QL: NEGATIVE
PH UR STRIP: 7 [PH] (ref 5–8)
PLATELET # BLD AUTO: 224 THOU/UL (ref 140–440)
PMV BLD AUTO: 10.5 FL (ref 8.5–12.5)
POTASSIUM BLD-SCNC: 4.3 MMOL/L (ref 3.5–5)
PROT SERPL-MCNC: 7.5 G/DL (ref 6–8)
RBC # BLD AUTO: 4.65 MILL/UL (ref 3.8–5.4)
SODIUM SERPL-SCNC: 138 MMOL/L (ref 136–145)
SP GR UR STRIP: 1.01 (ref 1–1.03)
TRIGL SERPL-MCNC: 78 MG/DL
TSH SERPL DL<=0.005 MIU/L-ACNC: 0.78 UIU/ML (ref 0.3–5)
UROBILINOGEN UR STRIP-ACNC: NORMAL
WBC: 5.3 THOU/UL (ref 4–11)

## 2019-09-18 ASSESSMENT — ANXIETY QUESTIONNAIRES
4. TROUBLE RELAXING: NEARLY EVERY DAY
7. FEELING AFRAID AS IF SOMETHING AWFUL MIGHT HAPPEN: NEARLY EVERY DAY
5. BEING SO RESTLESS THAT IT IS HARD TO SIT STILL: SEVERAL DAYS
6. BECOMING EASILY ANNOYED OR IRRITABLE: MORE THAN HALF THE DAYS
1. FEELING NERVOUS, ANXIOUS, OR ON EDGE: MORE THAN HALF THE DAYS
GAD7 TOTAL SCORE: 15
2. NOT BEING ABLE TO STOP OR CONTROL WORRYING: MORE THAN HALF THE DAYS
3. WORRYING TOO MUCH ABOUT DIFFERENT THINGS: MORE THAN HALF THE DAYS

## 2019-09-18 ASSESSMENT — PATIENT HEALTH QUESTIONNAIRE - PHQ9: SUM OF ALL RESPONSES TO PHQ QUESTIONS 1-9: 18

## 2019-09-18 ASSESSMENT — MIFFLIN-ST. JEOR: SCORE: 1359.63

## 2019-09-19 LAB — 25(OH)D3 SERPL-MCNC: 21.3 NG/ML (ref 30–80)

## 2019-09-20 LAB
GAMMA INTERFERON BACKGROUND BLD IA-ACNC: 0.84 IU/ML
M TB IFN-G BLD-IMP: POSITIVE
MITOGEN IGNF BCKGRD COR BLD-ACNC: >10 IU/ML
MITOGEN IGNF BCKGRD COR BLD-ACNC: >10 IU/ML
QTF INTERPRETATION: ABNORMAL
QTF MITOGEN - NIL: >10 IU/ML

## 2019-10-14 ENCOUNTER — COMMUNICATION - HEALTHEAST (OUTPATIENT)
Dept: FAMILY MEDICINE | Facility: CLINIC | Age: 29
End: 2019-10-14

## 2019-10-16 ENCOUNTER — AMBULATORY - HEALTHEAST (OUTPATIENT)
Dept: BEHAVIORAL HEALTH | Facility: CLINIC | Age: 29
End: 2019-10-16

## 2019-10-16 ENCOUNTER — OFFICE VISIT - HEALTHEAST (OUTPATIENT)
Dept: BEHAVIORAL HEALTH | Facility: CLINIC | Age: 29
End: 2019-10-16

## 2019-10-16 DIAGNOSIS — F43.10 PTSD (POST-TRAUMATIC STRESS DISORDER): ICD-10-CM

## 2019-10-16 DIAGNOSIS — F32.1 MODERATE SINGLE CURRENT EPISODE OF MAJOR DEPRESSIVE DISORDER (H): ICD-10-CM

## 2019-10-16 ASSESSMENT — ANXIETY QUESTIONNAIRES
2. NOT BEING ABLE TO STOP OR CONTROL WORRYING: MORE THAN HALF THE DAYS
3. WORRYING TOO MUCH ABOUT DIFFERENT THINGS: MORE THAN HALF THE DAYS
7. FEELING AFRAID AS IF SOMETHING AWFUL MIGHT HAPPEN: MORE THAN HALF THE DAYS
5. BEING SO RESTLESS THAT IT IS HARD TO SIT STILL: MORE THAN HALF THE DAYS
1. FEELING NERVOUS, ANXIOUS, OR ON EDGE: MORE THAN HALF THE DAYS
IF YOU CHECKED OFF ANY PROBLEMS ON THIS QUESTIONNAIRE, HOW DIFFICULT HAVE THESE PROBLEMS MADE IT FOR YOU TO DO YOUR WORK, TAKE CARE OF THINGS AT HOME, OR GET ALONG WITH OTHER PEOPLE: VERY DIFFICULT
4. TROUBLE RELAXING: MORE THAN HALF THE DAYS
6. BECOMING EASILY ANNOYED OR IRRITABLE: MORE THAN HALF THE DAYS
GAD7 TOTAL SCORE: 14

## 2019-10-16 ASSESSMENT — PATIENT HEALTH QUESTIONNAIRE - PHQ9: SUM OF ALL RESPONSES TO PHQ QUESTIONS 1-9: 16

## 2019-11-06 ENCOUNTER — OFFICE VISIT - HEALTHEAST (OUTPATIENT)
Dept: BEHAVIORAL HEALTH | Facility: CLINIC | Age: 29
End: 2019-11-06

## 2019-11-06 DIAGNOSIS — F32.1 MODERATE SINGLE CURRENT EPISODE OF MAJOR DEPRESSIVE DISORDER (H): ICD-10-CM

## 2019-11-06 DIAGNOSIS — Z63.4 BEREAVEMENT, UNCOMPLICATED: ICD-10-CM

## 2019-11-06 DIAGNOSIS — F43.10 PTSD (POST-TRAUMATIC STRESS DISORDER): ICD-10-CM

## 2019-11-06 SDOH — SOCIAL STABILITY - SOCIAL INSECURITY: DISSAPEARANCE AND DEATH OF FAMILY MEMBER: Z63.4

## 2019-11-08 ENCOUNTER — COMMUNICATION - HEALTHEAST (OUTPATIENT)
Dept: PHARMACY | Facility: CLINIC | Age: 29
End: 2019-11-08

## 2019-11-08 ENCOUNTER — AMBULATORY - HEALTHEAST (OUTPATIENT)
Dept: PHARMACY | Facility: CLINIC | Age: 29
End: 2019-11-08

## 2019-11-08 DIAGNOSIS — Z22.7 LATENT TUBERCULOSIS: ICD-10-CM

## 2019-11-19 ENCOUNTER — COMMUNICATION - HEALTHEAST (OUTPATIENT)
Dept: SCHEDULING | Facility: CLINIC | Age: 29
End: 2019-11-19

## 2019-11-19 ENCOUNTER — AMBULATORY - HEALTHEAST (OUTPATIENT)
Dept: BEHAVIORAL HEALTH | Facility: CLINIC | Age: 29
End: 2019-11-19

## 2019-11-19 DIAGNOSIS — Z22.7 LATENT TUBERCULOSIS: ICD-10-CM

## 2019-11-20 ENCOUNTER — AMBULATORY - HEALTHEAST (OUTPATIENT)
Dept: NURSING | Facility: CLINIC | Age: 29
End: 2019-11-20

## 2019-11-21 ENCOUNTER — COMMUNICATION - HEALTHEAST (OUTPATIENT)
Dept: FAMILY MEDICINE | Facility: CLINIC | Age: 29
End: 2019-11-21

## 2019-11-21 ENCOUNTER — COMMUNICATION - HEALTHEAST (OUTPATIENT)
Dept: BEHAVIORAL HEALTH | Facility: CLINIC | Age: 29
End: 2019-11-21

## 2019-11-21 ENCOUNTER — OFFICE VISIT - HEALTHEAST (OUTPATIENT)
Dept: BEHAVIORAL HEALTH | Facility: CLINIC | Age: 29
End: 2019-11-21

## 2019-11-21 DIAGNOSIS — F32.1 MODERATE SINGLE CURRENT EPISODE OF MAJOR DEPRESSIVE DISORDER (H): ICD-10-CM

## 2019-11-28 ENCOUNTER — COMMUNICATION - HEALTHEAST (OUTPATIENT)
Dept: FAMILY MEDICINE | Facility: CLINIC | Age: 29
End: 2019-11-28

## 2019-11-28 DIAGNOSIS — Z22.7 LATENT TUBERCULOSIS: ICD-10-CM

## 2019-12-03 ENCOUNTER — COMMUNICATION - HEALTHEAST (OUTPATIENT)
Dept: PHARMACY | Facility: CLINIC | Age: 29
End: 2019-12-03

## 2019-12-26 ENCOUNTER — HOSPITAL ENCOUNTER (OUTPATIENT)
Dept: PHARMACY | Facility: OTHER | Age: 29
Discharge: HOME | End: 2019-12-26
Attending: PHARMACIST

## 2019-12-26 DIAGNOSIS — Z22.7 LATENT TUBERCULOSIS: ICD-10-CM

## 2020-01-02 ENCOUNTER — COMMUNICATION - HEALTHEAST (OUTPATIENT)
Dept: PHARMACY | Facility: OTHER | Age: 30
End: 2020-01-02

## 2020-01-13 ENCOUNTER — COMMUNICATION - HEALTHEAST (OUTPATIENT)
Dept: FAMILY MEDICINE | Facility: CLINIC | Age: 30
End: 2020-01-13

## 2020-01-17 ENCOUNTER — COMMUNICATION - HEALTHEAST (OUTPATIENT)
Dept: PHARMACY | Facility: CLINIC | Age: 30
End: 2020-01-17

## 2020-01-17 DIAGNOSIS — Z22.7 LATENT TUBERCULOSIS: ICD-10-CM

## 2020-01-21 ENCOUNTER — COMMUNICATION - HEALTHEAST (OUTPATIENT)
Dept: PHARMACY | Facility: CLINIC | Age: 30
End: 2020-01-21

## 2020-01-22 ENCOUNTER — OFFICE VISIT - HEALTHEAST (OUTPATIENT)
Dept: FAMILY MEDICINE | Facility: CLINIC | Age: 30
End: 2020-01-22

## 2020-01-22 DIAGNOSIS — R53.83 FATIGUE, UNSPECIFIED TYPE: ICD-10-CM

## 2020-01-22 DIAGNOSIS — E55.9 VITAMIN D INSUFFICIENCY: ICD-10-CM

## 2020-01-22 DIAGNOSIS — R63.0 POOR APPETITE: ICD-10-CM

## 2020-01-22 DIAGNOSIS — Z22.7 LATENT TUBERCULOSIS: ICD-10-CM

## 2020-01-22 DIAGNOSIS — F33.1 MODERATE EPISODE OF RECURRENT MAJOR DEPRESSIVE DISORDER (H): ICD-10-CM

## 2020-01-22 LAB
ALBUMIN UR-MCNC: NEGATIVE MG/DL
ALT SERPL W P-5'-P-CCNC: 11 U/L (ref 0–45)
APPEARANCE UR: CLEAR
AST SERPL W P-5'-P-CCNC: 18 U/L (ref 0–40)
BASOPHILS # BLD AUTO: 0 THOU/UL (ref 0–0.2)
BASOPHILS NFR BLD AUTO: 1 % (ref 0–2)
BILIRUB UR QL STRIP: NEGATIVE
COLOR UR AUTO: YELLOW
EOSINOPHIL # BLD AUTO: 0.2 THOU/UL (ref 0–0.4)
EOSINOPHIL NFR BLD AUTO: 5 % (ref 0–6)
ERYTHROCYTE [DISTWIDTH] IN BLOOD BY AUTOMATED COUNT: 11.5 % (ref 11–14.5)
GLUCOSE UR STRIP-MCNC: NEGATIVE MG/DL
HCT VFR BLD AUTO: 41.3 % (ref 35–47)
HGB BLD-MCNC: 14 G/DL (ref 12–16)
HGB UR QL STRIP: NEGATIVE
KETONES UR STRIP-MCNC: NEGATIVE MG/DL
LEUKOCYTE ESTERASE UR QL STRIP: NEGATIVE
LYMPHOCYTES # BLD AUTO: 1.8 THOU/UL (ref 0.8–4.4)
LYMPHOCYTES NFR BLD AUTO: 49 % (ref 20–40)
MCH RBC QN AUTO: 30.1 PG (ref 27–34)
MCHC RBC AUTO-ENTMCNC: 33.9 G/DL (ref 32–36)
MCV RBC AUTO: 89 FL (ref 80–100)
MONOCYTES # BLD AUTO: 0.2 THOU/UL (ref 0–0.9)
MONOCYTES NFR BLD AUTO: 5 % (ref 2–10)
NEUTROPHILS # BLD AUTO: 1.5 THOU/UL (ref 2–7.7)
NEUTROPHILS NFR BLD AUTO: 40 % (ref 50–70)
NITRATE UR QL: NEGATIVE
PH UR STRIP: 7 [PH] (ref 5–8)
PLATELET # BLD AUTO: 241 THOU/UL (ref 140–440)
PMV BLD AUTO: 7.6 FL (ref 7–10)
RBC # BLD AUTO: 4.65 MILL/UL (ref 3.8–5.4)
SP GR UR STRIP: <=1.005 (ref 1–1.03)
T3 SERPL-MCNC: 93 NG/DL (ref 45–175)
T4 FREE SERPL-MCNC: 0.8 NG/DL (ref 0.7–1.8)
TSH SERPL DL<=0.005 MIU/L-ACNC: 0.68 UIU/ML (ref 0.3–5)
UROBILINOGEN UR STRIP-ACNC: NORMAL
WBC: 3.6 THOU/UL (ref 4–11)

## 2020-01-22 ASSESSMENT — ANXIETY QUESTIONNAIRES
5. BEING SO RESTLESS THAT IT IS HARD TO SIT STILL: SEVERAL DAYS
7. FEELING AFRAID AS IF SOMETHING AWFUL MIGHT HAPPEN: MORE THAN HALF THE DAYS
6. BECOMING EASILY ANNOYED OR IRRITABLE: SEVERAL DAYS
IF YOU CHECKED OFF ANY PROBLEMS ON THIS QUESTIONNAIRE, HOW DIFFICULT HAVE THESE PROBLEMS MADE IT FOR YOU TO DO YOUR WORK, TAKE CARE OF THINGS AT HOME, OR GET ALONG WITH OTHER PEOPLE: SOMEWHAT DIFFICULT
GAD7 TOTAL SCORE: 14
2. NOT BEING ABLE TO STOP OR CONTROL WORRYING: MORE THAN HALF THE DAYS
3. WORRYING TOO MUCH ABOUT DIFFERENT THINGS: NEARLY EVERY DAY
1. FEELING NERVOUS, ANXIOUS, OR ON EDGE: MORE THAN HALF THE DAYS
4. TROUBLE RELAXING: NEARLY EVERY DAY

## 2020-01-22 ASSESSMENT — PATIENT HEALTH QUESTIONNAIRE - PHQ9: SUM OF ALL RESPONSES TO PHQ QUESTIONS 1-9: 17

## 2020-01-23 ENCOUNTER — COMMUNICATION - HEALTHEAST (OUTPATIENT)
Dept: PHARMACY | Facility: OTHER | Age: 30
End: 2020-01-23

## 2020-01-23 LAB
25(OH)D3 SERPL-MCNC: 15.4 NG/ML (ref 30–80)
25(OH)D3 SERPL-MCNC: 15.4 NG/ML (ref 30–80)

## 2020-01-24 ENCOUNTER — COMMUNICATION - HEALTHEAST (OUTPATIENT)
Dept: FAMILY MEDICINE | Facility: CLINIC | Age: 30
End: 2020-01-24

## 2020-02-14 ENCOUNTER — COMMUNICATION - HEALTHEAST (OUTPATIENT)
Dept: PHARMACY | Facility: CLINIC | Age: 30
End: 2020-02-14

## 2020-02-19 ENCOUNTER — OFFICE VISIT - HEALTHEAST (OUTPATIENT)
Dept: FAMILY MEDICINE | Facility: CLINIC | Age: 30
End: 2020-02-19

## 2020-02-19 DIAGNOSIS — F51.04 PSYCHOPHYSIOLOGICAL INSOMNIA: ICD-10-CM

## 2020-02-19 DIAGNOSIS — Z01.84 IMMUNITY STATUS TESTING: ICD-10-CM

## 2020-02-19 DIAGNOSIS — Z02.89 ENCOUNTER FOR COMPLETION OF FORM WITH PATIENT: ICD-10-CM

## 2020-02-19 ASSESSMENT — MIFFLIN-ST. JEOR: SCORE: 1372.21

## 2020-02-21 LAB
MEV IGG SER IA-ACNC: POSITIVE
MUV IGG SER QL IA: POSITIVE

## 2020-03-13 ENCOUNTER — COMMUNICATION - HEALTHEAST (OUTPATIENT)
Dept: PHARMACY | Facility: CLINIC | Age: 30
End: 2020-03-13

## 2020-08-04 ENCOUNTER — COMMUNICATION - HEALTHEAST (OUTPATIENT)
Dept: PHARMACY | Facility: CLINIC | Age: 30
End: 2020-08-04

## 2020-11-27 ENCOUNTER — VIRTUAL VISIT (OUTPATIENT)
Dept: FAMILY MEDICINE | Facility: OTHER | Age: 30
End: 2020-11-27

## 2020-11-27 ENCOUNTER — AMBULATORY - HEALTHEAST (OUTPATIENT)
Dept: FAMILY MEDICINE | Facility: CLINIC | Age: 30
End: 2020-11-27

## 2020-11-27 DIAGNOSIS — Z20.822 EXPOSURE TO 2019 NOVEL CORONAVIRUS: ICD-10-CM

## 2020-11-27 NOTE — PROGRESS NOTES
"Date: 2020 11:29:30  Clinician: Felipe Maynard  Clinician NPI: [id number]  Patient: Nadine Kilgore  Patient : 1990  Patient Address: [address], [place] [address]  Patient Phone: [phone]  Visit Protocol: URI  Patient Summary:  Nadine is a 30 year old ( : 1990 ) female who initiated a [facility] Visit for COVID-19 (Coronavirus) evaluation and screening. When asked the question \"Please sign me up to receive news, health information and promotions. \", Nadine responded \"Yes\".    When asked when her symptoms started, Nadine reported that she does not have any symptoms.   She denies taking antibiotic medication in the past month and having recent facial or sinus surgery in the past 60 days.    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 In the past 14 days, Nadine has worked or volunteered at a hospital or a clinic. 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   Nadine has had a close contact with a laboratory-confirmed COVID-19 patient in the last 14 days. She was not exposed at her work. She does not know when she was exposed to the laboratory-confirmed COVID-19 patient.   Additional information about contact with COVID-19 (Coronavirus) patient as reported by the patient (free text): Daughter who is 2.8 years old was exposed to someone who tested positive. I am her sole care taker and parent   Nadine is not living in the same household with the COVID-19 positive patient. She was in an enclosed space for greater than 15 minutes with the COVID-19 patient.   During the encounter, only she was wearing a mask.   Since 2019, Nadine has been tested for COVID-19 and has not had upper respiratory infection or influenza-like illness.      " Result of COVID-19 test: Negative     Date of her COVID-19 test: 09/10/2020      Pertinent medical history  She has not been told by her provider to avoid NSAIDs.   Nadine does not get yeast infections when she takes antibiotics.   Nadine does not have diabetes. She denies having immunosuppressive conditions (e.g., chemotherapy, HIV, organ transplant, long-term use of steroids or other immunosuppressive medications, splenectomy). She does not have severe COPD and congestive heart failure. She does not have asthma.   Nadine does not need a return to work/school note.   Weight: 145 lbs   Nadine does not smoke or use smokeless tobacco.   She denies pregnancy and denies breastfeeding. She has menstruated in the past month.   Weight: 145 lbs    MEDICATIONS: No current medications, ALLERGIES: NKDA  Clinician Response:  Dear Nadine,   Based on your exposure to COVID-19 (coronavirus), we would like to test you for this virus.  1. Please call [phone] to schedule your visit. Explain that you were referred by [facility] to have a COVID-19 test. Be ready to share your [facility] visit ID number.  * If you need to schedule in [facility] please call [phone] or for [employer] employees please call [phone].   * If you need to schedule in the [place] area please call [phone]. [employer] employees call [phone].   The following will serve as your written order for this COVID Test, ordered by me, for the indication of suspected COVID [Z20.828]: The test will be ordered in [employer], our electronic health record, after you are scheduled. It will show as ordered and authorized by Tye Almaraz MD.  Order: COVID-19 (coronavirus) PCR for ASYMPTOMATIC EXPOSURE testing from [facility].   If you know you have had close contact with someone who tested positive, you should be quarantined for 14 days after this exposure. You should stay in quarantine for the14 days even if the covid test is negative, the optimal time to test after  exposure is 5-7 days from the exposure  Quarantine means   What should I do?  For safety, it's very important to follow these rules. Do this for 14 days after the date you were last exposed to the virus..  Stay home and away from others. Don't go to school or anywhere else. Generally quarantine means staying home from work but there are some exceptions to this. Please contact your workplace.   No hugging, kissing or shaking hands.  Don't let anyone visit.  Cover your mouth and nose with a mask, tissue or washcloth to avoid spreading germs.  Wash your hands and face often. Use soap and water.  What are the symptoms of COVID-19?  The most common symptoms are cough, fever and trouble breathing. Less common symptoms include headache, body aches, fatigue (feeling very tired), chills, sore throat, stuffy or runny nose, diarrhea (loose poop), loss of taste or smell, belly pain, and nausea or vomiting (feeling sick to your stomach or throwing up).  After 14 days, if you have still don't have symptoms, you likely don't have this virus.  If you develop symptoms, follow these guidelines.  If you're normally healthy: Please start another [facility] visit to report your symptoms. Go to [email or web address].  If you have a serious health problem (like cancer, heart failure, an organ transplant or kidney disease): Call your specialty clinic. Let them know that you might have COVID-19.  2. When it's time for your COVID test:  Stay at least 6 feet away from others. (If someone will drive you to your test, stay in the backseat, as far away from the  as you can.)  Cover your mouth and nose with a mask, tissue or washcloth.  Go straight to the testing site. Don't make any stops on the way there or back.  Please note  Caregivers in these groups are at risk for severe illness due to COVID-19:  o People 65 years and older  o People who live in a nursing home or long-term care facility  o People with chronic disease (lung, heart, cancer, diabetes,  kidney, liver, immunologic)  o People who have a weakened immune system, including those who:  Are in cancer treatment  Take medicine that weakens the immune system, such as corticosteroids  Had a bone marrow or organ transplant  Have an immune deficiency  Have poorly controlled HIV or AIDS  Are obese (body mass index of 40 or higher)  Smoke regularly  Where can I get more information?  [facility] -- About COVID-19: [email or web address]  CDC -- What to Do If You're Sick: www.cdc.gov/coronavirus/2019-ncov/about/steps-when-sick.html  CDC -- Ending Home Isolation: www.cdc.gov/coronavirus/2019-ncov/hcp/disposition-in-home-patients.html  [facility] -- Caring for Someone: www.cdc.gov/coronavirus/2019-ncov/if-you-are-sick/care-for-someone.html  [facility] -- Interim Guidance for Hospital Discharge to Home: www.[facility].[place]./diseases/coronavirus/hcp/hospdischarge.pdf  [facility] clinical trials (COVID-19 research studies): clinicalaffairs.[facility]-clinical-trials  Below are the COVID-19 hotlines at the [facility] ([facility]). Interpreters are available.  For health questions: Call 457-246-7768 or 1-523.599.1131 (7 a.m. to 7 p.m.)  For questions about schools and childcare: Call 407-445-5226 or 1-228.897.4750 (7 a.m. to 7 p.m.)    Diagnosis: Contact with and (suspected) exposure to other viral communicable diseases  Diagnosis ICD: Z20.828

## 2020-11-29 ENCOUNTER — AMBULATORY - HEALTHEAST (OUTPATIENT)
Dept: FAMILY MEDICINE | Facility: CLINIC | Age: 30
End: 2020-11-29

## 2020-11-29 DIAGNOSIS — Z20.822 EXPOSURE TO 2019 NOVEL CORONAVIRUS: ICD-10-CM

## 2020-12-01 ENCOUNTER — AMBULATORY - HEALTHEAST (OUTPATIENT)
Dept: FAMILY MEDICINE | Facility: CLINIC | Age: 30
End: 2020-12-01

## 2020-12-07 ENCOUNTER — AMBULATORY - HEALTHEAST (OUTPATIENT)
Dept: FAMILY MEDICINE | Facility: CLINIC | Age: 30
End: 2020-12-07

## 2020-12-07 DIAGNOSIS — Z20.822 SUSPECTED 2019 NOVEL CORONAVIRUS INFECTION: ICD-10-CM

## 2020-12-15 ENCOUNTER — OFFICE VISIT - HEALTHEAST (OUTPATIENT)
Dept: FAMILY MEDICINE | Facility: CLINIC | Age: 30
End: 2020-12-15

## 2020-12-15 DIAGNOSIS — R05.9 COUGH: ICD-10-CM

## 2020-12-17 ENCOUNTER — OFFICE VISIT - HEALTHEAST (OUTPATIENT)
Dept: FAMILY MEDICINE | Facility: CLINIC | Age: 30
End: 2020-12-17

## 2020-12-17 DIAGNOSIS — J02.9 SORE THROAT: ICD-10-CM

## 2020-12-17 DIAGNOSIS — U07.1 INFECTION DUE TO 2019 NOVEL CORONAVIRUS: ICD-10-CM

## 2020-12-18 ENCOUNTER — COMMUNICATION - HEALTHEAST (OUTPATIENT)
Dept: FAMILY MEDICINE | Facility: CLINIC | Age: 30
End: 2020-12-18

## 2020-12-19 ENCOUNTER — AMBULATORY - HEALTHEAST (OUTPATIENT)
Dept: FAMILY MEDICINE | Facility: CLINIC | Age: 30
End: 2020-12-19

## 2020-12-19 DIAGNOSIS — Z20.822 SUSPECTED 2019 NOVEL CORONAVIRUS INFECTION: ICD-10-CM

## 2020-12-19 DIAGNOSIS — J02.9 SORE THROAT: ICD-10-CM

## 2020-12-19 LAB — DEPRECATED S PYO AG THROAT QL EIA: ABNORMAL

## 2020-12-21 ENCOUNTER — COMMUNICATION - HEALTHEAST (OUTPATIENT)
Dept: SCHEDULING | Facility: CLINIC | Age: 30
End: 2020-12-21

## 2021-04-02 ENCOUNTER — AMBULATORY - HEALTHEAST (OUTPATIENT)
Dept: FAMILY MEDICINE | Facility: CLINIC | Age: 31
End: 2021-04-02

## 2021-04-02 DIAGNOSIS — Z20.822 EXPOSURE TO COVID-19 VIRUS: ICD-10-CM

## 2021-04-03 ENCOUNTER — AMBULATORY - HEALTHEAST (OUTPATIENT)
Dept: FAMILY MEDICINE | Facility: CLINIC | Age: 31
End: 2021-04-03

## 2021-04-03 DIAGNOSIS — Z20.822 EXPOSURE TO COVID-19 VIRUS: ICD-10-CM

## 2021-04-04 LAB
SARS-COV-2 PCR COMMENT: NORMAL
SARS-COV-2 RNA SPEC QL NAA+PROBE: NEGATIVE
SARS-COV-2 VIRUS SPECIMEN SOURCE: NORMAL

## 2021-04-05 ENCOUNTER — OFFICE VISIT - HEALTHEAST (OUTPATIENT)
Dept: FAMILY MEDICINE | Facility: CLINIC | Age: 31
End: 2021-04-05

## 2021-04-05 ENCOUNTER — COMMUNICATION - HEALTHEAST (OUTPATIENT)
Dept: SCHEDULING | Facility: CLINIC | Age: 31
End: 2021-04-05

## 2021-04-05 DIAGNOSIS — R09.81 CONGESTION OF NASAL SINUS: ICD-10-CM

## 2021-04-05 DIAGNOSIS — R50.9 FEVER, UNSPECIFIED FEVER CAUSE: ICD-10-CM

## 2021-04-05 DIAGNOSIS — J02.9 SORE THROAT: ICD-10-CM

## 2021-04-05 RX ORDER — ACETAMINOPHEN 325 MG/1
650 TABLET ORAL EVERY 6 HOURS PRN
Qty: 60 TABLET | Refills: 0 | Status: SHIPPED | OUTPATIENT
Start: 2021-04-05 | End: 2023-04-12

## 2021-05-21 ENCOUNTER — OFFICE VISIT - HEALTHEAST (OUTPATIENT)
Dept: FAMILY MEDICINE | Facility: CLINIC | Age: 31
End: 2021-05-21

## 2021-05-21 ENCOUNTER — COMMUNICATION - HEALTHEAST (OUTPATIENT)
Dept: FAMILY MEDICINE | Facility: CLINIC | Age: 31
End: 2021-05-21

## 2021-05-21 DIAGNOSIS — B00.1 HERPES LABIALIS: ICD-10-CM

## 2021-05-21 ASSESSMENT — PATIENT HEALTH QUESTIONNAIRE - PHQ9: SUM OF ALL RESPONSES TO PHQ QUESTIONS 1-9: 0

## 2021-05-26 ASSESSMENT — PATIENT HEALTH QUESTIONNAIRE - PHQ9
SUM OF ALL RESPONSES TO PHQ QUESTIONS 1-9: 16
SUM OF ALL RESPONSES TO PHQ QUESTIONS 1-9: 18

## 2021-05-27 ASSESSMENT — PATIENT HEALTH QUESTIONNAIRE - PHQ9: SUM OF ALL RESPONSES TO PHQ QUESTIONS 1-9: 17

## 2021-05-27 NOTE — PROGRESS NOTES
Mental Health Visit Note    4/17/2019    Start time: 9:05am    Stop Time: 10:03am   Session # 3    Session Type: Patient is presenting for an Individual session.    Nadine Kilgore is a 29 y.o. female is being seen today for    Chief Complaint   Patient presents with      Follow Up   .     New symptoms or complaints: None    Functional Impairment:   Personal: 3  Family: 4  Work: 4  Social:3    Clinical assessment of mental status: Reviewed. MSE is current effective 4/17/19  Grooming: Well groomed  Attire: Appropriate  Age: Appears Stated  Behavior Towards Examiner: Cooperative  Motor Activity: Within normal   Eye Contact: Appropriate  Mood: Irritable  Affect: Congruent w/content of speech and Irritable  Speech/Language: Within normal  Attention: Within normal  Concentration: Within normal  Thought Process: Within normal  Thought Content: Hallucinations: Within noraml  Delusions: Within normal  Orientation: X 3  Memory: No Evidence of Impairment  Judgement: No Evidence of Impairment  Estimated Intelligence: Average  Demonstrated Insight: Adequate  Fund of Knowledge: adequate       Suicidal/Homicidal Ideation present: None Reported This Session. Denies any SI or HI.     Patient's impression of their current status: Patient reports feeing very upset about a conversation with a friend. This is a close, dear friend. They are viewing things differently. The response she received from her friend caused her to feel judged. She has expressed her feelings to her friend and they continue to work on understanding each other's perspective. She doesn't know if she can fully trust this friend again. She feels if she talks openly about goals/plans with her that her friend will be thinking judgement thoughts in her head.     Therapist impression of patients current state: Patient presented for follow up individual psychotherapy. She was well-groomed, alert and oriented. She presented with irritable mood and was clearly very  upset about the conversation with this friend and the response she received. Allowed patient to process thoughts and feelings. Reviewed effective communication techniques and also how some of the tone/intention can be lost via text messaging. Encouraged further clarification and discussion to help repair relationship. She appears to be coping well in school and sleeping better now that her daughter has her own room. She presents with improved energy levels and less fear/startle response.     Type of psychotherapeutic technique provided: Insight oriented, Client centered and CBT, relational- communication skills    Progress toward short term goals:Progress as expected, improved sleep since last session. Also followed up with school about dropping one class and things feel more manageable now. She has been connecting with a lot of social supports. She continues to lack childcare for her daughter, which is still a stressor. She relies on people stepping up to help when she needs, but would prefer to have something organized/scheduled. She continues to look for childcare provider options.    Review of long term goals: Not done at today's visit. Effective 3/6/19- 6/6/19.     Diagnosis:   1. PTSD (post-traumatic stress disorder)    2. Moderate single current episode of major depressive disorder (H)        Plan and Follow up: Patient scheduled for follow up psychotherapy on 5/08/2019      Discharge Criteria/Planning: Client has chronic symptoms and ongoing therapy for maintenance stability recommended.    WILL Sims 4/17/2019

## 2021-05-27 NOTE — PROGRESS NOTES
"Mental Health Visit Note    3/27/2019    Start time: 1:17pm    Stop Time: 2:03pm   Session # 2    Session Type: Patient is presenting for an Individual session.    Nadine Kilgore is a 29 y.o. female is being seen today for    Chief Complaint   Patient presents with     MH Follow Up     Patient presented for follow up psychotherapy to address coping with school stress and balancing it with single parenting. She feels overwhelmed.   .     New symptoms or complaints: None    Functional Impairment:   Personal: 3  Family: 4  Work: 4  Social:3    Clinical assessment of mental status: Reviewed. MSE is current effective 3/27/19  Grooming: Well groomed  Attire: Appropriate  Age: Appears Stated  Behavior Towards Examiner: Cooperative  Motor Activity: Within normal   Eye Contact: Appropriate  Mood: Anxious  Affect: Congruent w/content of speech and Anxious  Speech/Language: Within normal  Attention: Within normal  Concentration: Brief  Thought Process: Within normal  Thought Content: Hallucinations: Within noraml  Delusions: Within normal  Orientation: X 3  Memory: No Evidence of Impairment  Judgement: No Evidence of Impairment  Estimated Intelligence: Average  Demonstrated Insight: Adequate  Fund of Knowledge: adequate       Suicidal/Homicidal Ideation present: None Reported This Session. Denies any SI or HI.     Patient's impression of their current status: Patient continues to be stressed with trying to find childcare so she can attend classes. She has minimal sleep as she is staying up to read and study for school. She is feeling overwhelmed as a single mom and starting to reflect on her desire for her daughter to have \"a father.\" It has been hard balancing single parenting and college courses. She would like to work, but is not sure how she will add that as she is already overwhelmed. She feels the need to drop a class and is requesting a letter of support.    Therapist impression of patients current state: Patient " presented for follow up individual psychotherapy. She was well groomed and had her 1 year old daughter with her. They presented with healthy attachment and she was attuned to her daughter's needs. Her thoughts about a future relationship/father for her child indicate some healing in regards to the loss of her child's father. She is an independent individual and desires to do things on her own; however, she would benefit from use of additional supports, especially in regards to childcare to help decrease her stress levels.   Patient continues to have moderately severe levels of anxiety and depression. We discussed this today and how she is coping. She is not on any psychotropics at this time and is still nursing.  PHQ-9 = 17  JAVAN-7 = 14  View flowsheets.  A letter was written per patient request for her to provide to her school re: anxiety levels and feeling the need to drop a class. Patient signed JAS for letter- scanned into EMR. View letters section as well.     Type of psychotherapeutic technique provided: Insight oriented, Client centered and Solution-focused    Progress toward short term goals:Progress as expected, continues to balance school and parenting, although she has high anxiety and depression. Encouraged more sleep, supports and coping efforts.     Review of long term goals: Not done at today's visit. Effective 3/6/19- 6/6/19.     Diagnosis:   1. PTSD (post-traumatic stress disorder)    2. Moderate single current episode of major depressive disorder (H)        Plan and Follow up: Patient and therapist discussed scheduling a follow up appointment the week of April 15. She was given a slip to bring to the  for scheduling.      Discharge Criteria/Planning: Client has chronic symptoms and ongoing therapy for maintenance stability recommended.    WILL Sims 3/27/2019

## 2021-05-28 ASSESSMENT — ANXIETY QUESTIONNAIRES
GAD7 TOTAL SCORE: 14
GAD7 TOTAL SCORE: 14
GAD7 TOTAL SCORE: 15

## 2021-05-28 NOTE — PROGRESS NOTES
4-23-19  Attempt 1: Care Guide called patient. Left voice message to call Sonam, Clinic Care Guide at [phone].  If this patient is returning my call, please transfer to [phone].    Upcoming appt 5-8-19     Follow up: 4-30-19

## 2021-05-28 NOTE — TELEPHONE ENCOUNTER
5-15-19  Called and left voice message to call Care guide back. [phone].  Will reach out to her again.

## 2021-05-28 NOTE — TELEPHONE ENCOUNTER
Reach out to patient regarding new goals and  schedule appt with [facility] SW for support.

## 2021-05-28 NOTE — TELEPHONE ENCOUNTER
5-8-19  Per Yen Garvey. Patient would like to work on goals of getting some flexible employment, learning to drive, finding stable childcare.   Will reach out to patient and connect with [facility] SW regarding goals above.

## 2021-05-28 NOTE — PROGRESS NOTES
5-13-19  Received voice message from patient.  5-15-19  Attempt 2: Care Guide called patient. Left voice message to call Sonam, Clinic Care Guide at [phone].  If this patient is returning my call, please transfer to [phone].    Follow up: 5-22-19

## 2021-05-28 NOTE — PROGRESS NOTES
Attempt 2: Care Guide called patient.  If this patient is returning my call, please transfer to Aun at ext [phone].    Next outreach: 5/10/19

## 2021-05-28 NOTE — PROGRESS NOTES
Mental Health Visit Note    5/8/2019    Start time: 1:06pm    Stop Time: 1: 58pm   Session # 4    Session Type: Patient is presenting for an Individual session.    Nadine Kilgore is a 29 y.o. female is being seen today for    Chief Complaint   Patient presents with     MH Follow Up     Patient presented for follow up psychotherapy to address coping with feeling overwhelmed and stressed.    .     New symptoms or complaints: None    Functional Impairment:   Personal: 3  Family: 4  Work: 4  Social: 3    Clinical assessment of mental status: Reviewed. MSE is current effective 5/08/2019  Grooming: Well groomed  Attire: Appropriate  Age: Appears Stated  Behavior Towards Examiner: sullen, guarded  Motor Activity: Within normal   Eye Contact: Appropriate  Mood: Depressed  Affect: Congruent w/content of speech and Flat  Speech/Language: Within normal  Attention: Within normal  Concentration: Within normal  Thought Process: Within normal  Thought Content: Hallucinations: Within noraml  Delusions: Within normal  Orientation: X 3  Memory: No Evidence of Impairment  Judgement: No Evidence of Impairment  Estimated Intelligence: Average  Demonstrated Insight: Adequate  Fund of Knowledge: adequate       Suicidal/Homicidal Ideation present: None Reported This Session. Denies SI/ HI.   Completed C-SSRS in session. No ideation. No self-harm. No preparatory behaviors. No past attempts. Low risk for suicide.       Patient's impression of their current status: Patient is in the end of the semester and has a few final exams for school. She feels stressed and overwhelmed at times. She still does not have childcare for her daughter and does not feel she can afford it at this time. It is stressful for her to find people to watch her child every day so that she can attend class.  She is not eligible for childcare benefits as her asylum case is still pending. She is unemployed. She stays up late at times to study and also has times she  "cannot sleep. She enjoys where she is living and feels very well supported and cared for by the family. She stated \"I don't know how I feel\" and that her feelings have been fluctuating a lot. She communicated openly with her friend, which left her feeling better about their relationship.   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 She wants to learn how to drive and get her license as taking the bus with her young daughter is often stressful.     Therapist impression of patients current state: Patient presented for follow up individual psychotherapy. Her young daughter was present. Her affect was flat and she was somewhat guarded in session. She presents with symptoms of depression and anxiety. Patient is alert, well groomed and oriented. She appears to have lost weight.  Updated measures- view flowsheets.   PHQ-9 Total Score: 13  JAVAN 7 Total Score: 15  Her mood and affect were different than usual today- she is not usually so guarded with this provider, but it is likely due to feeling stressed/overwhelmed and having to take 2 buses in the pouring rain with her young child to get to the clinic. It is unclear why she is not getting medical transportation at this time- it sounds like she may be having some insurance issues. Encouraged follow up on this with [employer] or with Care guide.   Prior to next session, call care guide back.      Type of psychotherapeutic technique provided: Insight oriented, Client centered and CBT, motivational interviewing.    Progress toward short term goals:Progress as expected, communicated with her friend and feeling better about the relationship.     Review of long term goals: Not done at today's visit. Effective 3/6/19- 6/6/19.     Diagnosis:   1. Moderate single current episode of major depressive disorder (H)    2. PTSD (post-traumatic stress disorder)        Plan and Follow up: Decided to wait for next follow " up after she completes the school semester as she will be busy with final exams. Patient scheduled for follow up psychotherapy on 6/5/2019      Discharge Criteria/Planning: Client has chronic symptoms and ongoing therapy for maintenance stability recommended.    WILL Sims 5/8/2019

## 2021-05-28 NOTE — PROGRESS NOTES
5-13-19  Attempt 1: Care Guide called patient. Left voice message to call Sonam, Clinic Care Guide at [phone].  If this patient is returning my call, please transfer to [phone].  Upcoming appt 6-5-19 at 10am with Yen Garvey.    Follow up: 5-22-19

## 2021-05-29 NOTE — PROGRESS NOTES
5-29-19  Attempt 3: Care Guide called patient. Left voice message to call Sonam, Clinic Care Guide at [phone].  If this patient is returning my call, please transfer to [phone].  No upcoming appt at this time.    I have called this patient 3 times over the past two weeks and have been unsuccessful in reaching her. This patient has also not returned any of my messages.   I will continue attempting to reach out to this patient in one month.  I will also check this patient's chart for upcoming appointments, ER reports that may contain a new phone number, or any other recent activity.    Follow up: 6-28-19

## 2021-05-30 NOTE — PROGRESS NOTES
6-17-19  Received return call from patient.  Spoke to patient and follow up on goal.  Patient reported:  -she called to report to [facility] but they could not help her with co pays even though she informed them she does not work. She don't know what to do anymore.  -still going to go school at [employer].  -moved to different place. New address updated in chart.   [facility] regarding the [facility] pays.    Plan: follow up 7-17-19

## 2021-05-30 NOTE — PROGRESS NOTES
7-29-19  No upcoming appt at this time.    Scheduled Follow Up Call: Attempt 2 Community Health Worker called and left a message for the patient. If the patient is returning my call, please transfer the patient to CRYSTAL Masters at [phone].    I have called the patient 2 times over the past two weeks and have been unsuccessful in reaching /her.  The patient has not returned any of my messages.     I have mailed a letter to the patient requesting a return call and will continue attempting to reach out to the patient in one month. I will also check the patient's chart for upcoming appointments, ER reports that may contain a new phone number, or any other recent activity.    Plan:   Mail unreachable letter  CHW next outreach in a month: 8-29-19

## 2021-05-30 NOTE — PROGRESS NOTES
7-17-19  [employer]  Open Encounter today.  Episodes created, care management status validated and encounters linked    7-17-19  Scheduled Follow Up Call: Attempt 1 Community Health Worker called and left a message for the patient. If the patient is returning my call, please transfer the patient to [phone].  No upcoming appt at this time.      Plan:   Monthly follow up  Next out reach: 7-25-19

## 2021-05-31 VITALS — WEIGHT: 168 LBS | BODY MASS INDEX: 27.96 KG/M2

## 2021-05-31 VITALS — WEIGHT: 180.2 LBS | BODY MASS INDEX: 29.99 KG/M2

## 2021-05-31 VITALS — HEIGHT: 65 IN | WEIGHT: 159.44 LBS | BODY MASS INDEX: 26.56 KG/M2

## 2021-05-31 VITALS — BODY MASS INDEX: 25.33 KG/M2 | WEIGHT: 152 LBS | HEIGHT: 65 IN

## 2021-05-31 VITALS — WEIGHT: 170 LBS | BODY MASS INDEX: 28.29 KG/M2

## 2021-05-31 VITALS — WEIGHT: 177.44 LBS | BODY MASS INDEX: 29.53 KG/M2

## 2021-05-31 VITALS — BODY MASS INDEX: 28.96 KG/M2 | WEIGHT: 174 LBS

## 2021-05-31 NOTE — PROGRESS NOTES
8-28-19  Maintenance follow up  Scheduled Follow Up Call: [facility] Worker called and left a message for the patient. If the patient is returning my call, please transfer the patient to [phone].  Upcoming appt with PCP 9-18-19 8-28-19  Received call from patient and follow up on goal.  Patient reported:  -she received the letter but did not have chance to call back.  -got a new job today. 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  -goes to [facility] 2x a week in the evening.  -continue to see [facility] once in a while.  -still live in the same place.  -has food stamp, [employer]  -has  from [employer] to help with filing for asylum   -did not get resolved regarding the $15 co-pay stated she has to pay for the co pay.  She will work on it if she can't pay. Goal completed.  No other goals.    Discussed with patient if she has any other goals or needs.  Patient requested information where to go for dental care for herself and the baby.  Provided patient contact information to [facility] in [place].  Stated she will look it up online for the address.  Add the dental ii information to Care Teams and updated Care Teams.    Patient okay to graduate from [employer]   Stated she will call [facility] if she needs support.    Reminded patient appt with PCP on 9-18-19 at 10:40am    Wished patient good luck on her school and new job.    Routed to  [employer] RN.  Please review chart if patient is appropriate to graduate from [employer]  If patient is appropriate to Graduate from [employer]), please update the Emergency Plan.  Patient does not have any other goals or needs at this time.  Patient has all the support and services in the community.    Route to PCP and Yen as FYI    Plan:   Complete Graduate Wellness Plan by 9-9-19

## 2021-06-01 VITALS — WEIGHT: 187 LBS | BODY MASS INDEX: 31.12 KG/M2

## 2021-06-01 VITALS — BODY MASS INDEX: 30.67 KG/M2 | WEIGHT: 184.31 LBS

## 2021-06-01 VITALS — WEIGHT: 170.19 LBS | HEIGHT: 65 IN | BODY MASS INDEX: 28.36 KG/M2

## 2021-06-01 VITALS — HEIGHT: 65 IN | WEIGHT: 173 LBS | BODY MASS INDEX: 28.82 KG/M2

## 2021-06-01 VITALS — WEIGHT: 183 LBS | BODY MASS INDEX: 30.45 KG/M2

## 2021-06-01 VITALS — BODY MASS INDEX: 30.45 KG/M2 | WEIGHT: 183 LBS

## 2021-06-01 VITALS — BODY MASS INDEX: 30.12 KG/M2 | WEIGHT: 181 LBS

## 2021-06-01 VITALS — WEIGHT: 186 LBS | BODY MASS INDEX: 30.95 KG/M2

## 2021-06-01 VITALS — HEIGHT: 65 IN | BODY MASS INDEX: 29.82 KG/M2 | WEIGHT: 179 LBS

## 2021-06-01 VITALS — HEIGHT: 65 IN | WEIGHT: 187 LBS | BODY MASS INDEX: 31.16 KG/M2

## 2021-06-01 NOTE — PROGRESS NOTES
Clinic Care Coordination Contact    Assessment: Care Coordinator contacted patient for 2 month follow up.  Patient has continued to follow the plan of care and assessment is negative for any new needs or concerns.    Enrollment status: Graduated.      Plan: No further outreaches at this time.  Patient will continue to follow the plan of care.  If new needs arise a new Care Coordination referral may be placed.  FYI to PCP  Depression  Everyone feels down at times. The blues are a natural part of life. But an unhappy period that s intense or lasts for more than a couple of weeks can be a sign of depression. Depression is a serious illness. It can disrupt the lives of family and friends. If you know someone you think may be depressed, find out what you can do to help.    Know the serious signals  Warning signals for suicide include:    Threats or talk of suicide    Statements such as  I won t be a problem much longer  or  Nothing matters     Giving away possessions or making a will or  arrangements    Buying a gun or other weapon    Sudden, unexplained cheerfulness or calm after a period of depression  If you notice any of these signs, get help right away. Call a health care professional, mental health clinic, or suicide hotline and ask what action to take. In an emergency, don t hesitate to call the police.    [facility] Mental Health Crisis Lines:  [facility] [phone]  [facility] [phone]  [facility] [phone]  [facility] [phone]  [facility], Adults [phone]  [facility], Children [phone]  [facility], Adults [phone]  [facility], Children [phone]  When a Loved One Has a Mental Illness   It s hard to watch a loved one deal with mental illness. You want to help. Yet you may not know what to do. Your loved one may even push you away. But don t give up. Your support is needed now more than ever. Talk to your loved one s health care provider.  Or contact a group for families of people with mental illness. They can help give you the guidance you need.  What you can do  Living with mental illness can be overwhelming. Your loved one may say or do things that shock or frighten you. Sometimes your loved one may resist treatment. Knowing what to do can help you cope:  Help your loved one get proper care. Often people with a mental illness deny there s a problem. Or they may not be able to seek help on their own.  Encourage your loved one to stick with treatment. This may be your most crucial job. Medicines that treat mental illness can have side effects. As a result, your loved one may stop taking them. But this will likely cause symptoms to come back. You might also want to go to healthcare provider visits with your loved one to discuss medicine and other issues.  Provide emotional support. Encourage your loved one to share his or her feelings. Listen and don t . Let your loved one know he or she can count on you.  Be patient. The healing process takes time. In some cases, your loved one may never fully recover. But his or her symptoms will likely improve.  Ask them to join in. Invite your loved one to take part in activities. But don t push.  Take care of yourself. Helping your loved one can be very stressful. Take time to care for yourself. You ll have more patience and will be better able to cope.    Seeking help  If you are worried your loved one may harm themselves or attempt suicide, ask him or her. Asking doesn t cause suicide.  If the suicide risk is not immediate, call the person s healthcare provider, go to a local mental health clinic, or call the National Suicide Prevention Lifeline at 463-326-ABPA (7934). It is open 24 hours a day, every day. They speak English and Icelandic. This resource provides immediate crisis intervention and information on local resources. It is free and confidential.   Don t ignore a person's talk of suicide or think  it s just an attention-seeking behavior. As hard as it is, talking can save lives.  Call 911  Call 911 if the person is at immediate risk of suicide (he or she has a suicide plan and access to a method such as guns or drugs). Or take the person to the nearest emergency room. Don t leave the person alone. Remove any guns and medicines.   Resources  National [place] on Mental Illness 711-477-6740 www.braulio.org  National [place] of Mental Health 972-283-9260 www.[facility].nih.gov  Mental Health [place] 009-121-7969 www.[facility].org  National Suicide Prevention Lifeline 256-601-YGXG (3070) www.suicidepreventionlifeline.org        [facility] Mental Health Crisis Lines:  [facility] [phone]  [facility] [phone]  [facility] [phone]  [facility] [phone]  [facility], Adults [phone]  [facility], Children [phone]  [facility], Adults [phone]  [facility], Children [phone]    Emergency Plan Recommendation:    When to Use the Emergency Department (ED)  An emergency means you could die if you don t get care quickly. Or you could be hurt permanently (disabled). Read below to know when to use -- and when not to use -- an emergency department (also called ED).    Dangers to your life  Here are examples of emergencies. These need immediate care:  A hard time breathing  Severe chest pain  Choking  Severe bleeding  Suddenly not able to move or speak  Blacking out (fainting)`  Poisoning    Dangers of permanent injuries  Here are other emergencies. These also need immediate care:  Deep cuts or severe burns  Broken bones, or sudden severe pain and swelling in a joint    When it s an emergency  If you have an emergency, follow these steps:    1. Go to the nearest emergency department  If you can, go to the hospital ED closest to you right away.  If you cannot get there right away, or if it is not safe to take yourself, call 911 or your police emergency number.  2. Call your  primary care doctor  Tell your doctor about the emergency. Call within 24 hours of going to the ED.  If you cannot call, have someone call for you.  Go to your doctor (not the ED) for any follow-up care.    When it s not an emergency  If a problem is not an emergency, follow these steps:    1. Call your primary care doctor  If you don t know the name of your doctor, call your health plan.  If you cannot call, have someone call for you.  2. Follow instructions  Your doctor will tell you what you should do.  You may be told to see your doctor right away. You may be told to go to the ED. Or you may be told to go to an urgent care center.  Follow your doctor s advice.

## 2021-06-01 NOTE — PROGRESS NOTES
9-9-19  My Clinic Care Coordination Wellness Plan  This Graduation Wellness Plan provides private information in regard to the work I have done with my Care Team at my Primary Care Clinic.  This document provides insight on the goals I have accomplished.  My Care Team congratulates me on my journey to maintain wellness.  This document will help guide me on my journey to maintain a healthy lifestyle.  I will use this to help me overcome any barriers I may encounter.  If I should have any questions or concerns, I will contact the members of my Care Team or contact my Primary Care Clinic for assistance.     [address]  [address]  [phone]    My Preferred Method of Contact:  Phone: [phone]    My Primary/Preferred Language:  English    Preferred Learning Style:  Reading information, Face to face discussion, Pictures/Diagrams and Hands on teaching    Emergency Contact: No emergency contact information on file.     PCP:  Alise Lang DO  Care Team            Alise Lang DO   [phone] PCP - General, Family Medicine    Yen Garvey LICSW   [phone] , Licensed Clinical     Therapy  Services   [facility]   [phone]    Familia Salas RN PHN   [phone] Registered Nurse,     [facility]    cell# [phone]            JAS signed: 11-15-17    [employer]    [phone]     Member ID#: [id number]         Alise Lang DO   [phone] Assigned PCP         [facility]   [phone] Dentist, Dental General Practice    [address] [place] [address]        Accomplishments:  Goals        Patient Stated      COMPLETED: I am healthy and baby is healthy.   (pt-stated)      Personal Plan:   I continue to follow up with Yen Garvey for therapy every 2 weeks to help alleviate symptoms of depression.  I will schedule appointment for my baby as recommended by the doctor for  recommended Well Child Check Up.          COMPLETED: I have health insurance for baby and myself. (pt-stated)      Personal Plan:  1. I will go to [facility] to renew insurance.  [address] [place] [address]    2. I will will call [facility] at [phone] to schedule an appointment with Saundra Padilla, [facility]  for support.  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 (pt-stated)      Personal Plan:  When I have work permit and change of immigration status, I will go to register for CNA Program at [facility]  [address], [place] [id number] [phone]          COMPLETED: I have resources and information where to go for support to go to college and apply for [facility] programs. (pt-stated)      Personal Plan:  Go to [facility] for support to go to college or for other needs  [address], [place] [id number] [phone]    Go to the [facility] to apply for  and other [facility] programs.  [address] [place] [address]          COMPLETED: I have support and attending parenting classes at [facility].  (pt-stated)      Personal Plan:  I will continue to attend parenting classes at [facility] for support and services.               COMPLETED: I know to go down [facility] to resolve the co-pay and  renew health insurance. (pt-stated)      Personal Plan:   Go to meet with staff at [facility] to address copays and clarify of insurance or benefits.  [address] [place] [address]  [phone]   EZ info line [phone] to check status of benefits and insurance              COMPLETED: I now have a stable place to live with my baby girl.   (pt-stated)      Personal Plan:  I moved into new place [address] [place] [address]  I will update county of new address.          COMPLETED: I now have food support from the [facility] for the baby and myself.  (pt-stated)      Personal Plan:  I will go to [facility] to reapply for food stamp and other [facility] benefits.  [address] [place] [address]                Advanced Directive/Living Will: The patient was given information regarding Adanced Directives/Living Will    Clinical Emergency Plan  [facility] Mental Health Crisis Lines:  [facility] [phone]  [facility] [phone]  [facility] [phone]  [facility] [phone]  [facility], Adults [phone]  [facility], Children [phone]  [facility], Adults [phone]  [facility], Children [phone]  When a Loved One Has a Mental Illness   It s hard to watch a loved one deal with mental illness. You want to help. Yet you may not know what to do. Your loved one may even push you away. But don t give up. Your support is needed now more than ever. Talk to your loved one s health care provider. Or contact a group for families of people with mental illness. They can help give you the guidance you need.  What you can do  Living with mental illness can be overwhelming. Your loved one may say or do things that shock or frighten you. Sometimes your loved one may resist treatment. Knowing what to do can help you cope:    Help your loved one get proper care. Often people with a mental illness deny there s a problem. Or they may not be able to seek help on their own.    Encourage your loved one to stick with treatment. This may be your most crucial job. Medicines that treat mental illness can have side effects. As a result, your loved one may stop taking them. But this will likely cause symptoms to come back. You might also want to go to healthcare provider visits with your loved one to discuss medicine and other issues.    Provide emotional support. Encourage your loved one to share his or her feelings. Listen and don t .  Let your loved one know he or she can count on you.    Be patient. The healing process takes time. In some cases, your loved one may never fully recover. But his or her symptoms will likely improve.    Ask them to join in. Invite your loved one to take part in activities. But don t push.    Take care of yourself. Helping your loved one can be very stressful. Take time to care for yourself. You ll have more patience and will be better able to cope.       Seeking help    If you are worried your loved one may harm themselves or attempt suicide, ask him or her. Asking doesn t cause suicide.    If the suicide risk is not immediate, call the person s healthcare provider, go to a local mental health clinic, or call the National Suicide Prevention Lifeline at 827-477-GSAW (6554). It is open 24 hours a day, every day. They speak English and Ukrainian. This resource provides immediate crisis intervention and information on local resources. It is free and confidential.     Don t ignore a person's talk of suicide or think it s just an attention-seeking behavior. As hard as it is, talking can save lives.  Call 911    Call 911 if the person is at immediate risk of suicide (he or she has a suicide plan and access to a method such as guns or drugs). Or take the person to the nearest emergency room. Don t leave the person alone. Remove any guns and medicines.   Resources    National [place] on Mental Illness 037-926-6009 www.braulio.org    National [place] of Mental Health 411-606-2585 www.nimh.nih.gov    Mental Health [place] 004-893-5905 www.[facility].org    National Suicide Prevention Lifeline 757-047-VQAM (3997) www.suicidepreventionlifeline.org           [facility] Mental Health Crisis Lines:  [facility] [phone]  [facility] [phone]  [facility] [phone]  [facility] [phone]  [facility], Adults [phone]  [facility], Children [phone]  [facility], Adults [phone]  [facility] [phone]     Emergency Plan Recommendation:     When to Use the Emergency Department (ED)  An emergency means you could die if you don t get care quickly. Or you could be hurt permanently (disabled). Read below to know when to use -- and when not to use -- an emergency department (also called ED).     Dangers to your life  Here are examples of emergencies. These need immediate care:  A hard time breathing  Severe chest pain  Choking  Severe bleeding  Suddenly not able to move or speak  Blacking out (fainting)`  Poisoning     Dangers of permanent injuries  Here are other emergencies. These also need immediate care:  Deep cuts or severe burns  Broken bones, or sudden severe pain and swelling in a joint     When it s an emergency  If you have an emergency, follow these steps:     1. Go to the nearest emergency department  If you can, go to the hospital ED closest to you right away.  If you cannot get there right away, or if it is not safe to take yourself, call 911 or your police emergency number.  2. Call your primary care doctor  Tell your doctor about the emergency. Call within 24 hours of going to the ED.  If you cannot call, have someone call for you.  Go to your doctor (not the ED) for any follow-up care.     When it s not an emergency  If a problem is not an emergency, follow these steps:     1. Call your primary care doctor  If you don t know the name of your doctor, call your health plan.  If you cannot call, have someone call for you.  2. Follow instructions  Your doctor will tell you what you should do.  You may be told to see your doctor right away. You may be told to go to the ED. Or you may be told to go to an urgent care center.  Follow your doctor s advice.         All [facility] clinic patients have access to a Nurse 24 hours a day, 7 days a week.  If you have questions or want advice from a Nurse, please know [facility] is here for you.  You can call your clinic and they will  connect you or you can call Care Connection at [phone].  [facility] also has Walk In Care clinics in multiple locations.  Call the number listed above for more information about our Walk In Care clinics or visit the [facility] website at [email or web address].
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
## 2021-06-02 NOTE — PROGRESS NOTES
Mental Health Visit Note    10/16/2019    Start time: 9:02am    Stop Time: 9:51am   Session # 5    Session Type: Patient is presenting for an Individual session.    Nadine Kilgore is a 29 y.o. female is being seen today for    Chief Complaint   Patient presents with      Follow Up     session to address coping with single parenting stress and feeling down.       New symptoms or complaints: None    Functional Impairment:   Personal: 3  Family: 3  Work: 3  Social: 3    Clinical assessment of mental status: Reviewed. MSE is current effective 10/16/2019  Grooming: Well groomed  Attire: Appropriate  Age: Appears Stated  Behavior Towards Examiner: slightly guarded  Motor Activity: Within normal   Eye Contact: Appropriate  Mood: Depressed  Affect: Congruent w/content of speech and Flat  Speech/Language: Within normal  Attention: Within normal  Concentration: Within normal  Thought Process: Within normal  Thought Content: Hallucinations: Within noraml - absent during session  Delusions: Within normal- reports some suspiciousness in relationships.   Orientation: X 3  Memory: No Evidence of Impairment  Judgement: No Evidence of Impairment  Estimated Intelligence: Average  Demonstrated Insight: Adequate  Fund of Knowledge: adequate       Suicidal/Homicidal Ideation present: None Reported This Session. Denies SI/ HI.   Completed C-SSRS in session. No ideation. No self-harm. No preparatory behaviors. No past attempts. Low risk for suicide.       Patient's impression of their current status:  She reports poor mental health and physical health. She is fatigued. She got a job, but hasn't been able to work since she has been physically ill.   She doesn't sleep before midnight. She has been having nightmares and seeing her ex boyfriend who passed away in her dreams. This has been lessening over time. She reports good medication adherence. Mental health symptoms have impacted relationships and she has distance herself from  others- some suspicion/trust issues. She has felt judged and criticized and ignored by some friends.  She continues to care for her daughter who is no in  at [facility] and it has been a good thing.   She has financial stress, including stress of co-pays and medical bills. Lacking transportation- relies on city bus or friends to help.    Therapist impression of patients current state: Patient presented for follow up individual psychotherapy. Her young daughter was present.  Patient was last seen by this provider on May 8, 2019.  Upon review of chart, patient has experienced psychiatric ED visits the end of July and beginning of August after being connected to [facility], the mental health crisis line through [facility].  There appears to have been symptoms of hallucinations and patient psychotropic medications were changed. Therapist reviewed most recent follow-up appointment with primary physician.  She had a recent hospitalization for physical health concerns. Today she presents with depressed mood. Her mental health has decompensated since I saw her last, specifially in regards to increased depression and anxiety symptoms and the onset of hallucinations she has been experiencing as well as suspicion and isolative behaviors.  She lacks awareness of how stressed and down she is and tell it becomes so severe that it greatly impacts her well-being and functioning.  Prior to next session she will tuned into her feelings and increase awareness of her internal experiences.  She will attend [facility] as a way of coping to help increase her hope and gabriel and strength.  She will continue to utilize supports outside of session and take medications as prescribed.    Patient and therapist updated treatment plan goals and measures. View MH Treatment plan document and flow-sheets.  PHQ-9: Little interest or pleasure in doing things: More than half the days  Feeling down, depressed, or hopeless: More than half the days  Trouble  falling or staying asleep, or sleeping too much: Nearly every day  Feeling tired or having little energy: More than half the days  Poor appetite or overeating: More than half the days  Feeling bad about yourself - or that you are a failure or have let yourself or your family down: More than half the days  Trouble concentrating on things, such as reading the newspaper or watching television: More than half the days  Moving or speaking so slowly that other people could have noticed. Or the opposite - being so fidgety or restless that you have been moving around a lot more than usual: Several days  Thoughts that you would be better off dead, or of hurting yourself in some way: Not at all  PHQ-9 Total Score: 16  If you checked off any problems, how difficult have these problems made it for you to do your work, take care of things at home, or get along with other people?: Very difficult  Depression Follow-up Plan: under care of mental health counselor    JAVAN-7:JAVAN-7 Screening Results:  How difficult did these problems make it for you to do your work, take care of things at home or get along with other people? : Very difficult (10/16/2019  9:00 AM)  Feeling nervous, anxious, or on edge: 2 (10/16/2019  9:00 AM)  Not being able to stop or control worryin (10/16/2019  9:00 AM)  Worrying too much about different things: 2 (10/16/2019  9:00 AM)  Trouble relaxin (10/16/2019  9:00 AM)  Being so restless that it's hard to sit still: 2 (10/16/2019  9:00 AM)  Becoming easily annoyed or irritable: 2 (10/16/2019  9:00 AM)  Feeling afraid as if something awful might happen: 2 (10/16/2019  9:00 AM)  JAVAN 7 Total Score: 14 (10/16/2019  9:00 AM)  How difficult did these problems make it for you to do your work, take care of things at home or get along with other people? : Very difficult (10/16/2019  9:00 AM)    CAGE: 0/4    Clinical Global Impressions Scale Ratings:    1. Considering your total clinical experience with this  particular population, how mentally ill is the patient at this time? (which is rated on the following seven-point scale: 1=normal, not at all ill; 2=borderline mentally ill; 3=mildly ill; 4=moderately ill; 5=markedly ill; 6=severely ill; 7=among the most extremely ill patients:) score of 5 today    2. Compared to the patient's condition at admission to the program, currently this patient's condition is: (1=very much improved since the initiation of treatment; 2=much improved; 3=minimally improved; 4=no change from baseline (the initiation of treatment); 5=minimally worse; 6= much worse; 7=very much worse since the initiation of treatment:) score of 5 today       Type of psychotherapeutic technique provided: Insight oriented, Client centered and CBT, motivational interviewing.    Progress toward short term goals:Poor progress, hasn't been to therapy for 5 months. Endorses worsening mental health.      Review of long term goals: Treatment Plan updated. Effective 10/16/2019-01/16/2020.    Diagnosis:   1. Moderate single current episode of major depressive disorder (H)    2. PTSD (post-traumatic stress disorder)        Plan and Follow up: Patient is scheduled for follow up psychotherapy on 11/6/19.    Discharge Criteria/Planning: Client has chronic symptoms and ongoing therapy for maintenance stability recommended.    WILL Sims 10/16/2019

## 2021-06-02 NOTE — TELEPHONE ENCOUNTER
FYI - Status Update  Who is Calling: Patient  Update: Patient would like Dr. Lang to know she is feeling better. Was seen in hospital on 10/4 for Fever, hemoptysis and had to cancel follow up appointment today due to transportation.   Okay to leave a detailed message?:  No return call needed

## 2021-06-03 VITALS
DIASTOLIC BLOOD PRESSURE: 56 MMHG | SYSTOLIC BLOOD PRESSURE: 90 MMHG | HEART RATE: 74 BPM | HEIGHT: 65 IN | WEIGHT: 145 LBS | BODY MASS INDEX: 24.16 KG/M2

## 2021-06-03 NOTE — TELEPHONE ENCOUNTER
Patient Returning Call  Reason for call:  Patient returning phone call from clinical staff.  Information relayed to patient:  See encounter from 11/28/19, patient received message and declined to schedule with MTM at this time as she is having trouble getting to clinic.  Patient has additional questions:  Yes  If YES, what are your questions/concerns:  Patient states that she would like prescription sent to the [facility] pharmacy on file if possible or please call patient back to discuss options ( she was at school and had limited time to talk)  Okay to leave a detailed message?: Yes

## 2021-06-03 NOTE — PROGRESS NOTES
"  Mental Health Visit Note    11/6/2019    Start time: 9:10am    Stop Time: 9:59am   Session # 6    Session Type: Patient is presenting for an Individual session.    Nadine Kilgore is a 29 y.o. female is being seen today for    Chief Complaint   Patient presents with      Follow Up     coping with grief/loss       New symptoms or complaints: None    Functional Impairment:   Personal: 3  Family: 3  Work: 3  Social: 3    Clinical assessment of mental status: Reviewed. MSE is current effective 11/6/19  Grooming: Well groomed  Attire: Appropriate  Age: Appears Stated  Behavior Towards Examiner: Cooperative  Motor Activity: Within normal   Eye Contact: Appropriate  Mood: Depressed  Affect: Congruent w/content of speech and Flat  Speech/Language: Within normal  Attention: Within normal  Concentration: Within normal  Thought Process: Within normal and frequent thought flow  Thought Content: Hallucinations: Visual on occasion   Delusions: Within normal  Orientation: X 3  Memory: No Evidence of Impairment  Judgement: No Evidence of Impairment  Estimated Intelligence: Average  Demonstrated Insight: Adequate  Fund of Knowledge: adequate       Suicidal/Homicidal Ideation present: None Reported This Session.       Patient's impression of their current status: She has been feeling really tired and not sleeping well.  \"I feel like I do not have peace.\"  Always worried/anxious.  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  Transportation is the biggest barrier to this and other parts of her life.  With the colder weather is having to take the bus which can be long especially with young toddler, causing frustration.  She expresses worry about her future with her daughter.  She contemplates not living in [place] for ever as there are too many reminders that are everywhere.  She feels like she needs to close her eyes when walking and going past things.  \"It feels like things are chasing after " "me.\"  Places and things are reminders of her partner who passed away and cause a lot of triggers for grief/loss.  She endorsed that the clinic here is even a trigger for her.  She identifies that part of her culture, being , is to be strong.  She is open about having a lot that she does not want to talk about.  She gives an analogy of a balloon in the water that you keep trying to push down but it keeps popping up to the surface. She did not refill her medications after they ran out- she didn't want to be taking them and does not feel a difference. She reports frequent thoughts and sometimes feeling like what is going on in her mind is real life in the moment. She has moments of seeing her  partner and thinking he is alive.        Therapist impression of patients current state: Patient presented for follow up individual psychotherapy.  Patient appears tired and depressed.  Her affect is flat and she seems to be really low energy and her body appears heavy.  We explored what it would look like to have peace in her life and what it would feel like.  She was able to process through this and identify that peacefulness would be: To wake up and my mind feels clear in my body feels light.  At this time when she has having a lot of stress she is noticing a lot of heaviness in her body and frequent thought flow.  We explored ways that she can create more peace in her life.  Having additional supports and reaching out to them would be beneficial.  Therapist also provided education of struggling to find peace when engaging in avoidance and wanting to shove things down, such as the balloon comment.  Identified the strength encourage it takes to be vulnerable and feel the grief and the loss and processed through those things.  It seems at this time that a lot of her thoughts are future oriented.  She states that it is not so much about the grief and loss of her partner, but there are many things she brings up " today that show how much it is still impacting her and on her mind including struggling to sleep and having occasional visual hallucinations and bad dreams about him.  Patient has a strong tendency to avoid, which was present when we attempted trauma therapy in the past.  It will take time for her to be comfortable to want to talk about those things in session and processed through that rather than engage in avoidance.  She was encouraged to follow up with her primary care physician because she has stopped taking her medications without oversight.      Clinical Global Impressions Scale Ratings:    1. Considering your total clinical experience with this particular population, how mentally ill is the patient at this time? (which is rated on the following seven-point scale: 1=normal, not at all ill; 2=borderline mentally ill; 3=mildly ill; 4=moderately ill; 5=markedly ill; 6=severely ill; 7=among the most extremely ill patients:) score of 5 today    2. Compared to the patient's condition at admission to the program, currently this patient's condition is: (1=very much improved since the initiation of treatment; 2=much improved; 3=minimally improved; 4=no change from baseline (the initiation of treatment); 5=minimally worse; 6= much worse; 7=very much worse since the initiation of treatment:) score of 5 today       Type of psychotherapeutic technique provided: Insight oriented, Client centered and CBT, Solution Focused    Progress toward short term goals:Poor progress, stopped taking medications without oversight.    She is utilizing one good support she has to help with her daughter. She could still benefit from additional supports as she is worn out and feeling depleted balancing everything.     Review of long term goals: Long-term goals reviewed , signed and scanned into EHR. Effective 10/16/2019-01/16/2020.    Diagnosis:   1. Moderate single current episode of major depressive disorder (H)    2. PTSD (post-traumatic  stress disorder)    3. Bereavement, uncomplicated        Plan and Follow up: Patient is scheduled for follow up psychotherapy on 12/03/19.    Discharge Criteria/Planning: Client has chronic symptoms and ongoing therapy for maintenance stability recommended.    WILL Sims 11/6/2019

## 2021-06-03 NOTE — TELEPHONE ENCOUNTER
Called [facility] pharmacy spoke to Jayla and canceled the prescription. Called patient and informed her of the below message.

## 2021-06-03 NOTE — TELEPHONE ENCOUNTER
Can we call and cancel this prescription? [facility] Please call patient and update her as well- expecting them to arrive next week.

## 2021-06-03 NOTE — PROGRESS NOTES
"Mental Health Visit Note    11/21/2019    Start time: 11:10am    Stop Time: 12:03pm   Session # 7    Session Type: Patient is presenting for an Individual session.    Nadine Kilgore is a 29 y.o. female is being seen today for    Chief Complaint   Patient presents with      Follow Up     coping with fatigue and grief/loss       New symptoms or complaints: None    Functional Impairment:   Personal: 4  Family: 3  Work/school: 3  Social: 3    Clinical assessment of mental status: Reviewed. MSE is current effective 11/21/19  Grooming: Well groomed  Attire: Appropriate  Age: Appears Stated  Behavior Towards Examiner: Cooperative  Motor Activity: Within normal   Eye Contact: Appropriate  Mood: Depressed  Affect: Congruent w/content of speech  Speech/Language: Within normal  Attention: Within normal  Concentration: Within normal  Thought Process: Within normal and frequent thought flow  Thought Content: Hallucinations: Within noraml    Delusions: Within normal  Orientation: X 3  Memory: No Evidence of Impairment  Judgement: No Evidence of Impairment  Estimated Intelligence: Average  Demonstrated Insight: Adequate  Fund of Knowledge: adequate       Suicidal/Homicidal Ideation present: None Reported This Session.       Patient's impression of their current status: So sad, tired. Wanting to drop a class at school- too overwhelming with health conditions lately: hospital, mental health.  \"I stress a lot. I don't have a lot of sleep. Days I don't want to get out of bed.\" Fatigued. Grief/loss, especially for her daughter.   Feeling like a failure. Wanting a stable paycheck- looking into jobs.   Started attending [employer].     Therapist impression of patients current state: Patient presented for follow up individual psychotherapy. She was sad in session. Normalized feelings of grief/loss. Developed awareness of shattered dreams for her and her daughter with their loss. Her depression symptoms have increased, impacting her energy " "levels, sleep, focus at school. Discussed working on transition from therapy with this provider to [employer] if she is wanting to continue with them. At this time, she appears very concerned about her presenting symptoms, the severity, and curious about what is \"wrong\" with her. Based on her reported symptoms, they are characteristic of uncomplicated bereavement. Therapist provided psychoeducation on grief process.  Patient signed JAS - therapist provided letter to patient about school for her to use as needed at school.       Clinical Global Impressions Scale Ratings:    1. Considering your total clinical experience with this particular population, how mentally ill is the patient at this time? (which is rated on the following seven-point scale: 1=normal, not at all ill; 2=borderline mentally ill; 3=mildly ill; 4=moderately ill; 5=markedly ill; 6=severely ill; 7=among the most extremely ill patients:) score of 5 today    2. Compared to the patient's condition at admission to the program, currently this patient's condition is: (1=very much improved since the initiation of treatment; 2=much improved; 3=minimally improved; 4=no change from baseline (the initiation of treatment); 5=minimally worse; 6= much worse; 7=very much worse since the initiation of treatment:) score of 4 today       Type of psychotherapeutic technique provided: Insight oriented, Client centered and CBT, Grief Cycle    Progress toward short term goals:Poor progress, significant grief/loss process impacting sleep and functioning at school.    Review of long term goals: Not done at today's visit. Effective 10/16/2019-01/16/2020.    Diagnosis:   1. Moderate single current episode of major depressive disorder (H)        Plan and Follow up: Patient is scheduled for follow up psychotherapy on 12/03/19.    Discharge Criteria/Planning: Client has chronic symptoms and ongoing therapy for maintenance stability recommended.    Yen Garvey, LICSW 11/21/2019  "

## 2021-06-03 NOTE — TELEPHONE ENCOUNTER
Who is calling:  Julissa  Reason for Call:  Caller requesting to speak with the pharmacist regarding eligibility for the LTBI Program for the patient. Please clarify & view the eligibility on the website.  Date of last appointment with primary care: 9/18/19  Okay to leave a detailed message: Yes

## 2021-06-03 NOTE — TELEPHONE ENCOUNTER
Received Priftin 150 mg #24 and Isoniazid 300 mg #12 from [facility].     Left message with patient to inform that medication has arrived. Would prefer to have MTM visit with patient prior to initiating therapy for a review of the medications as well as discussion of plan of care (pt would need to be contacted weekly by PharmD for adherence checks). Pt was advised to call to schedule this appointment.           Kyle Maier PharmD  Medication Therapy Management (MTM) Pharmacist  [facility]

## 2021-06-03 NOTE — TELEPHONE ENCOUNTER
Medication Question or Clarification  Who is calling: Pharmacy: [facility]  What medication are you calling about? (include dose and sig)    Disp Refills Start End    rifapentine 150 mg Tab 72 each 0 11/19/2019     Sig - Route: Take 900 mg by mouth once a week. - Oral    Sent to pharmacy as: rifapentine 150 mg tablet    E-Prescribing Status: Receipt confirmed by pharmacy (11/19/2019  4:29 PM CST)      Who prescribed the medication?: Dr. Akhtar   What is your question/concern?: Fax stated this medication is not available for [facility] to order. Please send in an alternative.  Ph[facility]acy: [facility] #[id number]  Okay to leave a detailed message?: No  Site CMT - Please call the pharmacy to obtain any additional needed information.

## 2021-06-03 NOTE — TELEPHONE ENCOUNTER
"Mariano calling to clarify on patient's eligibility for TB med program.    Patient did have her recent QuantiFERON test, but would need to have a previous negative test in order to qualify as \"recent seroconverter.\"    However, patient did arrive to the [place] within the last 5 years (October 2017 mental health encounter indicates he she had arrived 5 months prior) therefore does still qualify to get medications through [facility].    Julissa will update patient's records and have medication sent to clinic as previously planned.    Kyle Maier, PharmD  Medication Therapy Management (MTM) Pharmacist  [facility]    "

## 2021-06-03 NOTE — TELEPHONE ENCOUNTER
Who is calling:  Patient  Reason for Call:  Requesting to speak directly with Melissa, Behavioral Therapy, patient did not wish to share further information.  Date of last appointment with primary care: 11/6/19  Okay to leave a detailed message: No

## 2021-06-03 NOTE — TELEPHONE ENCOUNTER
Per request from Alise Lang DO, pt to be started on treatment for LTBI.       Positive blood test 9/18/2019   Negative CXR on 10/4     Lab Results   Component Value Date    ALT 42 10/04/2019    AST 32 10/04/2019    ALKPHOS 95 10/04/2019    BILITOT 0.4 10/04/2019     Per MD/CDC guidelines, first line therapy would be (Isoniazid 15 mg/kg + Weight based Rifapentine) x 12 weeks.     Entered orders and submitted to [facility]. Will await med supply and contact patient for an MTM visit when supply has arrived to clinic.     Kyle Maier, BobD  Medication Therapy Management (MTM) Pharmacist  [facility]

## 2021-06-03 NOTE — TELEPHONE ENCOUNTER
Medication Question or Clarification  Who is calling: Patient  What medication are you calling about? (include dose and sig)   rifapentine 150 mg Tab 72 each 0 11/8/2019     Sig - Route: Take 900 mg by mouth once a week. - Oral    Class: Print        Who prescribed the medication?: Alise Lang, DO    What is your question/concern?: Medication was not received at the pharmacy.  Also the patient needs this prescription to go to [facility] on [address]  Pharmacy: [facility] # [id number]  Okay to leave a detailed message?: Yes  Site CMT - Please call the pharmacy to obtain any additional needed information.

## 2021-06-03 NOTE — PROGRESS NOTES
Out reach call to patient as provider was informed that patient had called and requested to speak with this provider.  Patient answered and requested to be seen for soonest available appointment.  She does not want to wait until her next appointment with this provider in December.  She does not provide details but reports she is safe at this time.  She is now scheduled to see this provider on 11/21/2019 at 11 AM.  Patient is aware of crisis resources available and has the contact information if needing to seek crisis mental health support prior to upcoming appointment. Yen SOLIS

## 2021-06-04 VITALS — WEIGHT: 148 LBS | BODY MASS INDEX: 24.63 KG/M2

## 2021-06-04 VITALS
WEIGHT: 146.9 LBS | RESPIRATION RATE: 16 BRPM | SYSTOLIC BLOOD PRESSURE: 102 MMHG | HEIGHT: 65 IN | BODY MASS INDEX: 24.47 KG/M2 | HEART RATE: 66 BPM | DIASTOLIC BLOOD PRESSURE: 60 MMHG

## 2021-06-04 NOTE — TELEPHONE ENCOUNTER
Who is calling:  Patient   Reason for Call:  Patient needs to be seen before the 30th and there are no openings at [facility].  Offered [place], but patient states it is too far, as she does not drive.  Please contact patient to go over other scheduling options  Date of last appointment with primary care: n/a  Okay to leave a detailed message: Yes

## 2021-06-04 NOTE — TELEPHONE ENCOUNTER
Patient called for weekly follow up of LTBI treatment.        Patient is prescribed (Isoniazid 300 mg 3 tabs and Rifapentine 150 mg 6 tabs) once weekly x 12 weeks. Treatment was started on 12/26 (due to complete last dose on 3/12/2020). They have missed 0 doses of medication to date. No appetite changes. No nausea/vomiting. No abdominal pain, fatigue, or weakness. No s/s jaundice. No rash or itching. No s/s of neuropathy. Does note change in urine color, as expected with Rifapentine.       Plan:   1. Continue current treatment       Follow-Up  1 week via phone with MTM Pharmacist     Kyle Maier, PharmD  Medication Therapy Management (MTM) Pharmacist  [facility]

## 2021-06-04 NOTE — PROGRESS NOTES
Patient called for initiation of LTBI treatment.        Patient is prescribed (Isoniazid 300 mg 3 tabs and Rifapentine 150 mg 6 tabs) once weekly x 12 weeks. Treatment is being started today, 12/26/19.     Counseled patient on instructions for use. Reviewed safety and potential side effects such as appetite changes, nausea/vomiting, abdominal pain, fatigue, or weakness, jaundice.     Pt does not wear contacts, did inform that Rifapentine can cause changes to color of urine.     Will follow-up weekly with the patient to ensure treatment completion.       Kyle Maier, PharmD  Medication Therapy Management (MTM) Pharmacist  [facility]

## 2021-06-04 NOTE — TELEPHONE ENCOUNTER
Returned call to patient. LVM explaining that Priftin is on back order, [facility] was unable to get it in, but we were able to get the medication from the [facility] and it was delivered to the clinic.     Would prefer to have an initial MTM visit to review plan of care ([facility] recommends weekly contact to ensure course completion) as well as review medication information and instructions and monitoring. If pt is not able to schedule for an in-person visit, can schedule for a phone visit, but pt would still need to come to the clinic to  the medication.     Kyle Maier, PharmD  Medication Therapy Management (MTM) Pharmacist  [facility]

## 2021-06-05 NOTE — TELEPHONE ENCOUNTER
Covering for Kyle Maier, PharmD. Patient called for weekly follow up of LTBI treatment.      Patient is prescribed (Isoniazid 300 mg 3 tabs and Rifapentine 150 mg 6 tabs) once weekly x 12 weeks. Treatment was started on 12/26 (due to complete last dose on 3/12/2020). They have missed 0 doses of medication to date. No appetite changes. No nausea/vomiting. No abdominal pain, fatigue, or weakness. No s/s jaundice. No rash or itching. No s/s of neuropathy. Does note change in urine color, as expected with Rifapentine.  Is also having some nausea and fatigue, but  nothing to complain about.   Given that these are potential side effects of medications, will place an order for LFT recheck for pt to have drawn when she comes to clinic to  her next refill.      Plan:   1. Continue current treatment   2. LFTs in 1-2 weeks    Follow-Up  1 week via phone with [facility] Pharmacist     Jesica Denton (Kerri), PharmD  Medication Therapy Management Pharmacist  [facility]

## 2021-06-05 NOTE — TELEPHONE ENCOUNTER
Patient Returning Call  Reason for call:  Returning call from clinic  Information relayed to patient:  Advised no message to relay  Patient has additional questions:  Yes  If YES, what are your questions/concerns:  Please call her back to let know what calling about. Thanks.  Okay to leave a detailed message?: Yes

## 2021-06-05 NOTE — TELEPHONE ENCOUNTER
Patient called for weekly follow up on LTBI treatment.     Patient is prescribed (Isoniazid 300 mg 3 tabs and Rifapentine 150 mg 6 tabs) once weekly x 12 weeks. Treatment was started on 12/26 (due to complete last dose on 3/12/2020). They have missed 0 doses of medication to date. No appetite changes. No vomiting. No abdominal pain, or weakness. No s/s jaundice. No rash or itching. No s/s of neuropathy. Does note change in urine color, as expected with Rifapentine.      Is also having some nausea and fatigue, but  nothing to complain about.   AST/ALT from yesterday WNL.     Pt willing to continue with treatment course. Likely safe to do so.     Lab Results   Component Value Date    ALT 11 01/22/2020    AST 18 01/22/2020    ALKPHOS 95 10/04/2019    BILITOT 0.4 10/04/2019        Plan:   1. Continue current treatment        Follow-Up  1 week via phone with MTM Pharmacist     Kyle Maier, BobD  Medication Therapy Management (MTM) Pharmacist  [facility]

## 2021-06-05 NOTE — TELEPHONE ENCOUNTER
Received Priftin 150 mg #24 and Isoniazid 300 mg #12 from [facility].     Placed at  for patient to  when she comes in for PCP appointment tomorrow.     Will follow-up as previously scheduled on Thursday for weekly monitoring.       Kyle Maier, PharmD  Medication Therapy Management (MTM) Pharmacist  [facility]

## 2021-06-05 NOTE — PROGRESS NOTES
[facility] CLINIC EXAM NOTE      Chief Complaint   Patient presents with     Fatigue     Concerns     letter for school, regarding hospital visit in october, was discharged from school and she needs refund but they will need proof       ASSESSMENT & PLAN    Nadine was seen today for fatigue and concerns.    Diagnoses and all orders for this visit:    Vitamin D insufficiency:  Recheck today to see if improved- contributing to fatigue??  -     Vitamin D, Total (25-Hydroxy)    Fatigue, unspecified type: work up previously negative. Now reports abnormal thyroid lab at another facility. Will recheck thyroid today. Check cbc, UA for UTI, vit D as above. I still suspect depression/mental health is the main factor of her fatigue. encouraged her to get friends and find passions other than daughter- although she is enjoying her schooling. See below management of depression and poor appetite with Mirtazapine. Consider post viral fatigue syndrome?   -     HM1(CBC and Differential)  -     HM1 (CBC with Diff)  -     Thyroid Stimulating Hormone (TSH)  -     T4, Free  -     T3, Total  -     Urinalysis-UC if Indicated    Poor appetite:  Start daily vitamin. Start mirtazapine for depression and appetite.   -     multivitamin (ONE A DAY) per tablet; Take 1 tablet by mouth daily.    Moderate episode of recurrent major depressive disorder (H):  Continues to be poorly controlled. Encouraged finding friends and new passions. Start mirtazapine for mood and appetite stimulant. F?u in 4 weeks to recheck.   -     mirtazapine (REMERON) 15 MG tablet; Take 1 tablet (15 mg total) by mouth at bedtime.    Latent tuberculosis:  Follows with Kyle. Checking in weekly with him. Due for labs below.   -     ALT (SGPT)  -     AST (SGOT)      Will write her letter and send via my chart regarding her illness back in October.      HISTORY    Nadine Kilgore is a 29 y.o.  female who presents for the following issues:    1. Fatigue: more intense. Started  after hospitalization. Really tired. muscles so weak and couldn't do anything.   After that still feeling very tired a lot.   Lost appetite. Trying to drink a lot of water to not be so dehydrated and low energy.   The only ting that makes me happy right now is [employer]. Nothing else to live for.   everything ofr her- getting good care for her.   cna't feel happy for any reason other than her.   See her grow up.   No friends.  Lot of problems miss understanding.   Quaker family pray together. But doesn's want to share feelings or more than that.   Accused of gossip.   Does not miss having friends.   Is in school - gives her purpose and makes her happy. Likes thainvg a goal progressing. Look farward to.  [facility].  They said that there is something wrong with her thyroid level and to check it in 2 to 3 weeks.    2. Latent TB treatment. Taking meds. Checking in with Kyle weekly. [place].         MEDICATIONS    Current Outpatient Medications on File Prior to Visit   Medication Sig Dispense Refill     acetaminophen (TYLENOL) 325 MG tablet Take 650 mg by mouth every 6 (six) hours as needed for pain.       ibuprofen (ADVIL,MOTRIN) 200 MG tablet Take 200-400 mg by mouth every 8 (eight) hours as needed for pain.       isoniazid (NYDRAZID) 300 MG tablet Take 3 tablets (900 mg total) by mouth once a week for 12 doses. 36 tablet 0     rifapentine 150 mg Tab Take 900 mg by mouth once a week. 72 each 0     No current facility-administered medications on file prior to visit.        Pertinent past medical, surgical, social and family history reviewed and updated in [facility].    Social History     Socioeconomic History     Marital status: Single     Spouse name: Not on file     Number of children: Not on file     Years of education: Not on file     Highest education level: Not on file   Occupational History     Not on file   Social Needs     Financial resource  strain: Not on file     Food insecurity:     Worry: Not on file     Inability: Not on file     Transportation needs:     Medical: Not on file     Non-medical: Not on file   Tobacco Use     Smoking status: Never Smoker     Smokeless tobacco: Never Used   Substance and Sexual Activity     Alcohol use: No     Drug use: No     Sexual activity: Yes     Partners: Male   Lifestyle     Physical activity:     Days per week: Not on file     Minutes per session: Not on file     Stress: Not on file   Relationships     Social connections:     Talks on phone: Not on file     Gets together: Not on file     Attends Faith service: Not on file     Active member of club or organization: Not on file     Attends meetings of clubs or organizations: Not on file     Relationship status: Not on file     Intimate partner violence:     Fear of current or ex partner: Not on file     Emotionally abused: Not on file     Physically abused: Not on file     Forced sexual activity: Not on file   Other Topics Concern     Not on file   Social History Narrative     Not on file         REVIEW OF SYSTEMS    ROS: 10 pt review of systems preformed and all negative except noted in HPI.     PHYSICAL EXAM    Wt 148 lb (67.1 kg)   BMI 24.63 kg/m    GEN:  29 y.o. female sitting comfortably in no apparent distress.   HEENT: EOMI, no scleral icterus, buccal mucosa moist  Neck: Supple without lymphadenopathy or thyromegally   CHEST/LUNG: No respiratory distress, good air flow to bases, CTAB   CV: RRR, S1, S2 normal; no murmurs, rubs or gallops. No edema.  ABD:  Soft, non-tender, non distended, no guarding,  No masses  MSK:  Strength grossly normal  SKIN: warm, dry, no rashes or lesions  NEURO: Gait normal, coordination intact  PSYCH:  Depressed affect, tired appearing.       At the end of the encounter, I discussed results, diagnosis, medications. Discussed red flags for immediate return to clinic/ER, as well as indications for follow up if no improvement.  Patient and/or caregiver understood and agreed to plan. Patient was stable for discharge.      Alise Lang

## 2021-06-06 NOTE — TELEPHONE ENCOUNTER
Received Priftin 150 mg #24 and Isoniazid 300 mg #12 from [facility].     Placed at  for patient to . Pt was notified. Pt wondering if she can switch administration from Thursday evening to Wednesday evenings. Informed this would be okay.     This is patient's final month of treatment. Has missed zero doses. Continues to have some stomach upset and a little fatigue, but not enough that pt feels she needs to discontinue therapy. Last LFTS were WNL.       Kyel Maier, PharmD  Medication Therapy Management (MTM) Pharmacist  [facility]

## 2021-06-06 NOTE — PROGRESS NOTES
[facility] EXAM NOTE      Chief Complaint   Patient presents with     Follow-up       ASSESSMENT & PLAN    Nadine was seen today for follow-up.    Diagnoses and all orders for this visit:    Psychophysiological insomnia: given patient's concern about med side effects will trial melatonin as more of a natural option for her to help her sleep. Encourage therapy sessions. She Is to bring daughter in to be seen for evaluation.   -     melatonin 5 mg Tab tablet; Take 0.5 tablets (2.5 mg total) by mouth at bedtime as needed.    Encounter for completion of form with patient:  Reviewed form and we will need labs checked as below. Will fill out for her and she can  next week.     Immunity status testing  -     Rubeola Antibody, IgG  -     Mumps Antibody, IgG        HISTORY    Nadine Kilgore is a 29 y.o.  female who presents for the following issues:    Insomnia: patient has been sending me messages about her insomnia and not wanting to take the Mirtazapine due to side effects listed. Wondering how to dispose of the pills. She is almost done with her latent TB treatment which she also hates. encouraged her to finish. Daughter has been sick a lot and keeps her up at night. She does think overall mood has improved but just is so tired and needs a good night sleep. Wants help naturally.     Also planning to volunteer and has a form that needs to be filled out regarding immunizations and TB status      MEDICATIONS    Current Outpatient Medications on File Prior to Visit   Medication Sig Dispense Refill     acetaminophen (TYLENOL) 325 MG tablet Take 650 mg by mouth every 6 (six) hours as needed for pain.       ibuprofen (ADVIL,MOTRIN) 200 MG tablet Take 200-400 mg by mouth every 8 (eight) hours as needed for pain.       mirtazapine (REMERON) 15 MG tablet Take 1 tablet (15 mg total) by mouth at bedtime. 30 tablet 2     multivitamin (ONE A DAY) per tablet Take 1 tablet by mouth daily. 120 tablet 2     rifapentine  "150 mg Tab Take 900 mg by mouth once a week. 72 each 0     No current facility-administered medications on file prior to visit.        Pertinent past medical, surgical, social and family history reviewed and updated in Epic.    Social History     Socioeconomic History     Marital status: Single     Spouse name: Not on file     Number of children: Not on file     Years of education: Not on file     Highest education level: Not on file   Occupational History     Not on file   Social Needs     Financial resource strain: Not on file     Food insecurity:     Worry: Not on file     Inability: Not on file     Transportation needs:     Medical: Not on file     Non-medical: Not on file   Tobacco Use     Smoking status: Never Smoker     Smokeless tobacco: Never Used   Substance and Sexual Activity     Alcohol use: No     Drug use: No     Sexual activity: Yes     Partners: Male   Lifestyle     Physical activity:     Days per week: Not on file     Minutes per session: Not on file     Stress: Not on file   Relationships     Social connections:     Talks on phone: Not on file     Gets together: Not on file     Attends Anabaptist service: Not on file     Active member of club or organization: Not on file     Attends meetings of clubs or organizations: Not on file     Relationship status: Not on file     Intimate partner violence:     Fear of current or ex partner: Not on file     Emotionally abused: Not on file     Physically abused: Not on file     Forced sexual activity: Not on file   Other Topics Concern     Not on file   Social History Narrative     Not on file         REVIEW OF SYSTEMS    ROS: 10 pt review of systems preformed and all negative except noted in HPI.       PHYSICAL EXAM    /60 (Patient Site: Right Arm, Patient Position: Sitting, Cuff Size: Adult Regular)   Pulse 66   Resp 16   Ht 5' 5\" (1.651 m)   Wt 146 lb 14.4 oz (66.6 kg)   BMI 24.45 kg/m    GEN:  29 y.o. female sitting comfortably in no apparent " distress.   HEENT: EOMI, no scleral icterus, buccal mucosa moist  Neck: Supple without lymphadenopathy  CHEST/LUNG: No respiratory distress  SKIN: warm, dry, no rashes or lesions  NEURO: Gait normal, coordination intact  PSYCH:  Mood and affect appropriate      At the end of the encounter, I discussed results, diagnosis, medications. Discussed red flags for immediate return to clinic/ER, as well as indications for follow up if no improvement. Patient and/or caregiver understood and agreed to plan. Patient was stable for discharge.      Alise Lang
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
## 2021-06-13 NOTE — PROGRESS NOTES
"Nadine Kilgore is a 30 y.o. female who is being evaluated via a billable video visit.      The patient has been notified of following:     \"This video visit will be conducted via a call between you and your physician/provider. We have found that certain health care needs can be provided without the need for an in-person physical exam.  This service lets us provide the care you need with a video conversation.  If a prescription is necessary we can send it directly to your pharmacy.  If lab work is needed we can place an order for that and you can then stop by our lab to have the test done at a later time.    Video visits are billed at different rates depending on your insurance coverage. Please reach out to your insurance provider with any questions.    If during the course of the call the physician/provider feels a video visit is not appropriate, you will not be charged for this service.\"    Patient has given verbal consent to a Video visit? Yes  How would you like to obtain your AVS? AVS Preference: MyChart.  If dropped by the video visit, the video invitation should be sent to: Text to cell phone: [phone]   Will anyone else be joining your video visit? No        Video Start Time: 1:49 PM    Video-Visit Details    Type of service:  Video Visit    Video End Time (time video stopped): 2:09 PM  Originating Location (pt. Location): Home    Distant Location (provider location):  [facility]     Platform used for Video Visit: Elda Doran MD     ASSESSMENT AND PLAN:     We spent 20 minutes today in direct patient contact via video conference,w 100% of the time in consultation concerning medical problems as listed below. Phone visit done due to covid 19 crisis.     HEADACHE FROM COVID  Tested positive for covid 12/7/20. All symptoms are improving except headache.   Recommend push fluids.   Also take tylenol and ibuprofen.   Tessalon perles offered but she declined saying " she cannot pick them up.  I did tell her I can prescribe if she calls back in the next 24 hours. She will consider.        Chief Complaint   Patient presents with     Covid positive and not getting any better        HPI  Nadine Kilgore is a 30 y.o. female  who is generally healthy presents for worsening COVID sx via video visit.     She tested positive for covid Monday December 7, 2020.  She say she is staying the same.  She had a fever but that is gone.  She is having body aches. She is also coughing, but that is improving.  Runny nose is gone.  No trouble breathing noted. She is feeling really tired. She says she has a frontal headaches and she says the light is bothering her.     She has taken tyelnol.  She took 1000mg tylenol yesterday. She also tried ibuprofen last week.         Social History     Tobacco Use   Smoking Status Never Smoker   Smokeless Tobacco Never Used      Current Outpatient Medications on File Prior to Visit   Medication Sig Dispense Refill     acetaminophen (TYLENOL) 325 MG tablet Take 650 mg by mouth every 6 (six) hours as needed for pain.       [DISCONTINUED] melatonin 5 mg Tab tablet Take 0.5 tablets (2.5 mg total) by mouth at bedtime as needed. 30 tablet 1     [DISCONTINUED] mirtazapine (REMERON) 15 MG tablet Take 1 tablet (15 mg total) by mouth at bedtime. 30 tablet 2     [DISCONTINUED] multivitamin (ONE A DAY) per tablet Take 1 tablet by mouth daily. 120 tablet 2     [DISCONTINUED] rifapentine 150 mg Tab Take 900 mg by mouth once a week. 72 each 0     [DISCONTINUED] ibuprofen (ADVIL,MOTRIN) 200 MG tablet Take 200-400 mg by mouth every 8 (eight) hours as needed for pain.       No current facility-administered medications on file prior to visit.       No Known Allergies      Review of Systems   Constitutional: Negative.    HENT: Negative.    Eyes: Negative.    Respiratory: Negative.    Cardiovascular: Negative.    Gastrointestinal: Negative.    Endocrine: Negative.    Genitourinary:  Negative.    Musculoskeletal: Negative.    Skin: Negative.    Neurological: Negative.    Hematological: Negative.    Psychiatric/Behavioral: Negative.         OBJECTIVE: There were no vitals taken for this visit.   Physical Exam  Constitutional:       General: She is not in acute distress.     Appearance: She is well-developed.      Comments: Looks tired, but alert. Laying in bed.    HENT:      Head: Normocephalic and atraumatic.   Eyes:      Conjunctiva/sclera: Conjunctivae normal.   Neck:      Musculoskeletal: Neck supple.   Pulmonary:      Effort: Pulmonary effort is normal.   Musculoskeletal: Normal range of motion.   Neurological:      Mental Status: She is alert and oriented to person, place, and time.        This note was created using Dragon dictation.  Please excuse any grammatical errors.

## 2021-06-13 NOTE — PROGRESS NOTES
11-2-17  Co-visited with WILL Sims.    Met with patient and introduced self as Clinic Care Guide.  Provided patient [facility] brochure with Care Guide direct contact information.  Scheduled RN Assessment with Juliane Victor RN on 11-15-17 at 9 am.  Reminded of OB visit with Dr. Lang next Wednesday 11-8-17 at 1:40pm.    Plan:  Meet with patient to Schedule Goal Setting with PCP 11-15-17

## 2021-06-13 NOTE — TELEPHONE ENCOUNTER
New Appointment Needed  What is the reason for the visit:    Same Date/Next Day Appt Request  What is the reason for your visit?: Strep test    Provider Preference: Nurse  How soon do you need to be seen?: today, no availability, patient wants drive up test. She stated the Dr told her she could do this.  Waitlist offered?: No  Okay to leave a detailed message:  Yes

## 2021-06-13 NOTE — PROGRESS NOTES
Please call patient and inform:  Other tests negative.  Pap Did not show any signs of cervical cancer.  Quad screen showed low risk for any chromosomal abnormality of fetus.

## 2021-06-13 NOTE — PROGRESS NOTES
First OB   Feeling overall well.  No nausea or vomiting.  Appetite is good.  Does endorse some right lower quadrant/right pelvic pain for about 2 months now.  Started with the pregnancy.  Comes and goes.  Not sure how long it lasts for.  Worse after standing for a long time.  No Vaginal bleeding.  Good fetal movement.  No contractions noted.  Also endorses some vaginal discharge.:  x 1-2 weeks.  Had some discharge before pregnancy. Non itchy, nobad ordor. Color is white. No bleeding.   No pain with urination.  Does have some frequency.    No HA or vision changes.    Labs/imaging reviewed: wnl  Discussed screening for aneuploidy and neural tube defects.       Preformed physical exam   Reviewed NEVAEH, past medical history, past surgical history, family history, genetic history, and previous obstetrical history.   Quad screen today  Added on urine culture  [place], GC chlamydia  Pap smear completed  20 week ultrasound ordered for next week  Follow-up in 4 weeks    Alise Lang
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
## 2021-06-14 NOTE — PROGRESS NOTES
Mental Health Visit Note    12/15/2017  Start time: 9:23am   Stop Time: 10:05am   Session #6    Nadine Kilgore is a 27 y.o. female is being seen today for    Chief Complaint   Patient presents with     MH Follow Up     Patient presented for follow up psychotherapy appointment to address symptoms of trauma, depression and anxiety   .     New symptoms or complaints: continuing to sleep better. Still presenting with fear/anxiety about working with , but started the process. Expressed concern about her weight.     Functional Impairment:   Personal: 4  Family: 4  Work: 4  Social:4    Clinical assessment of mental status:   Grooming: Well groomed  Attire: Appropriate  Age: Appears Stated  Behavior Towards Examiner: Cooperative  Motor Activity: Within normal   Eye Contact: Fleeting  Mood: Anxious  Affect: Congruent w/content of speech, Anxious and Depressed  Speech/Language: Within normal and Soft  Attention: Hypervigilant  Concentration: Brief  Thought Process: Within normal  Thought Content: Hallucinations: Within noraml  Delusions: Within normal  Orientation: X 3  Memory: No Evidence of Impairment  Judgement: No Evidence of Impairment  Estimated Intelligence: Average  Demonstrated Insight: Adequate  Fund of Knowledge: adequate       Suicidal/Homicidal Ideation present: None Reported This Session    Patient's impression of their current status: Patient presented for follow up psychotherapy appointment. She was late and reports she missed the train and had to take the next one. She had forgotten to schedule a medical ride. She continues to report improvement in her sleeping. Patient completed the over-the-phone interview with the  office human rights advocate. However, she reports missing a call from them to schedule the in-person interview. She endorses anxiety and fear about meeting in person and having to share her story. She explained that she usually tries to avoid  "talking about what has happened to her or thinking about it because it is too distressing. Patient requested to call  during session as she notes a decrease in anxiety if feeling supported during the call. She wanted to ask questions and schedule the in-person interview. Now that her classes are done, she has been spending time at a school library, which she reports has been enjoyable. She continues to see her boyfriend at times. She is still stressed about finding housing and has spoken to the [facility] about resources. She does not have any items for her baby besides a bath. At the end of session, she mentioned feeling uncomfortable in her skin and that she is \"fat\" and putting on too much weight.     Therapist impression of patients current state: Patient continues to endorse symptoms of depression, anxiety and trauma. Empathic listening about patient's presenting concerns and symptoms. Joined with patient as she requested support during a phone call. It is difficult for patient to trust others, especially with her story. There is evidence of a strong therapeutic relationship between patient and therapist as she is open in session and trusting of therapist to provide support. Therapist provided support and empowered patient to call in session. Patient was able to speak with the staff member at the office and express some of her questions and concerns. Therapist provided support throughout the call. Patient was able to get scheduled with the in-person interview. Assisted patient in advocating for a female interviewer to feel more comfortable. Patient expressed how helpful it has been to call with therapist in an effort to decrease anxiety. Empowered patient to share her story with the interviewer as they are supporting her human rights and advocating for her. Encouraged deep breathing exercises and asking for what she needs to feel comfortable in the moment, such as if she needs the door open or " water to drink. During session today, patient was great at advocating for her needs by asking for a drink and asking for the door to be open a bit towards the end. She continues to struggle to be in an enclosed room, even with people she feels safe with. Enclosed spaces are a trigger for her trauma experiences. Provided positive reinforcement to patient throughout session. Assisted patient in getting connected to the clinic SW for more items for her baby.  Utilized CBT skills to address negative thoughts about body image/weight and assisted with reframing and how her body is doing amazing things growing a home for a baby. She reports she is eating healthy and not eating junk foods. Encouraged her to continue to eat healthy foods for her and baby and focus on the positive things her body is doing by growing another human. Based on patient's presenting symptoms today and throughout her follow up therapy sessions, patient meets DSM-5 criteria for PTSD. She has experienced direct exposure to traumatic events, has intrusive symptoms such as distressing dreams and marked physiological reaction to external cues, persistent avoidance of stimuli, and negative alterations in cognitions and mood, and marked alterations in arousal such as hypervigilance and sleep disturbance. She has experienced these symptoms for over 1 month. The symptoms are causing significant distress and impairing her daily functioning. She does not present with any dissociative symptoms.     Type of psychotherapeutic technique provided: Insight oriented, Client centered, Solution-focused and CBT    Progress toward short term goals:Progress as expected, maintaining follow up appointments and developing therapeutic relationship. Continues to have symptoms of depression and anxiety and PTSD, but noticing improvement in her ability to manage symptoms by implement coping skills. Improvement in sleep is also helping significantly.    Review of long term goals:  Not done at today's visit and Treatment Plan effective 10/24/17-1/24/18.    Diagnosis:   1. PTSD (post-traumatic stress disorder)       R/O Major Depressive Disorder, Single Episode    Plan and Follow up: Patient is scheduled for follow up psychotherapy services on 12/29/2017.      Discharge Criteria/Planning: Client has chronic symptoms and ongoing therapy for maintenance stability recommended.    WILL Sims 12/15/2017      This note was created with help of Dragon dictation software. Grammatical / typing errors are not intentional and inherent to the software.

## 2021-06-14 NOTE — PROGRESS NOTES
11-14-17  Called and spoke to patient. Reminded of RN Assessment tomorrow at 9am with Juliane Victor, CCC RN.  Patient confirmed tomorrow appointment.      Plan:  Meet with patient tomorrow to schedule Goal Setting visit with PCP.

## 2021-06-14 NOTE — PROGRESS NOTES
12-6-17  Attempt#1  Patient cancelled today's appointment.  Patient rescheduled with PCP to 12-11-17 at 1pm    PCP was given information to [facility] for parenting classes and food shelf at [facility].    Plan:  Meet with patient in clinic 12-11-17 at 1pm follow up on goals.

## 2021-06-14 NOTE — PROGRESS NOTES
"  Mental Health Visit Note    11/17/2017  Start time: 9:05am   Stop Time: 10:00am   Session #4    Nadine Kilgore is a 27 y.o. female is being seen today for    Chief Complaint   Patient presents with     MH Follow Up     Patient presented for follow up psychotherapy appointment to address symptoms of anxiety and depression.   .     New symptoms or complaints: Increased worries about housing. Continues to experience frequent nightmares and inability to sleep. Increased depression and anxiety symptoms     Functional Impairment:   Personal: 4  Family: 4  Work: 4  Social:4    Clinical assessment of mental status:   Grooming: Well groomed  Attire: Appropriate  Age: Appears Stated  Behavior Towards Examiner: Cooperative  Motor Activity: Within normal   Eye Contact: Fleeting  Mood: Anxious  Affect: Congruent w/content of speech, Anxious and Depressed  Speech/Language: Within normal and Soft  Attention: Hypervigilant  Concentration: Brief  Thought Process: Within normal  Thought Content: Hallucinations: Within noraml  Delusions: Within normal  Orientation: X 3  Memory: No Evidence of Impairment  Judgement: No Evidence of Impairment  Estimated Intelligence: Average  Demonstrated Insight: Adequate  Fund of Knowledge: adequate       Suicidal/Homicidal Ideation present: None Reported This Session    Patient's impression of their current status: Patient presented for follow up psychotherapy appointment. Patient reports continued difficulty with sleeping. She reports feeling \"really tired.\" She notes she is eventually falling asleep at night, but struggles to stay asleep. She reports thinking a lot and an inability to control worries. She is considering moving to another state because a man she met at [facility] said his brother would take her in. Patient is wondering if she should move to [place] to live with this person. She expresses a desire to have stable housing, yet is questioning why someone would take her in when " they have never met.     Therapist impression of patients current state: Patient continues to endorse symptoms of depression, anxiety and trauma. Patient continues to have inability to sleep. Patient was prescribed Benadryl by PCP and she has taken it for 2 nights, but is not sure if it has helped improve her sleep. Provided empathic listening to patient's worries and concerns. Provided psychoeducation on anxiety as it appears patient feels the need to make a decision immediately in order to resolve sense of anxiety.  Utilized CBT skills to address distortions in thinking. Utilized solution-focused model and explored the pros and cons of moving to [place] with this persons he has never met.   Patient identified pros: stable housing, a family/support/sense of belonging. Patient identified cons: Having to start again with resources and supports and providers, safety concerns, added stress of moving. Patient has been set up with a clinic care guide and recently had an assessment with the RN and is working on goal setting with care guide.      Type of psychotherapeutic technique provided: Insight oriented, Client centered, Solution-focused and CBT    Progress toward short term goals:Progress as expected, maintaining follow up appointments and developing therapeutic relationship. Continues to have symptoms of depression and anxiety as well as symptoms characteristic of PTSD    Review of long term goals: Not done at today's visit and Treatment Plan effective 10/24/17-1/24/18.    Diagnosis:   1. Adjustment disorder with mixed anxiety and depressed mood    R/O PTSD  R/O Major Depressive Disorder, Single Episode    Plan and Follow up: Patient is scheduled for follow up psychotherapy services on 12/01/2017.      Discharge Criteria/Planning: Client has chronic symptoms and ongoing therapy for maintenance stability recommended.    WILL Sims 11/17/2017      This note was created with help of Dragon dictation  software. Grammatical / typing errors are not intentional and inherent to the software.

## 2021-06-14 NOTE — PROGRESS NOTES
11-21-17  My Clinic Care Coordination Care Plan    [facility]  [address] [place]  [address]  [phone]      My Preferred Method of Contact:  Phone: [phone]    My Primary/Preferred Language:  English    Preferred Learning Style:  Reading information, Face to face discussion and Pictures/Diagrams    Emergency Contact: Extended Emergency Contact Information  Primary Emergency Contact: David Pantoja   [facility]  Home Phone: [phone]  Relation: Friend     PCP:  Alise Lang DO  Care Team            Alise Lang DO PCP - General, Family Medicine    [phone]     Sonam [facility] Care Coordination Care Guide, Clinic Care Coordination    [facility]  [phone]    Fax: [phone]      Yen Garvey [facility] , Licensed Clinical     [phone]     Therapy  Services   [facility]   [phone]    DHS Release Form     JAS Signed: 11-15-17    Familia Salas RN PHN Registered Nurse,     [phone]     [facility]# [phone]            JAS signed: 11-15-17    Juliane Victor RN Registered Nurse, Clinic Care Coordination    [facility]     [phone]     attend classes    [address] [place] [address]        Hospitalizations and/or ED Visits  Most Recent: none     Previous:  none  Reason:  none    Concerns regarding my health : healthy pregnancy, parenting/prenatal classes, resources for pregnancy mother, stable home place to live with the baby, financial support    Advanced Directive/Living Will: The patient was given information regarding Adanced Directives/Living Will      Nadine elected to enroll in the [facility] Care Coordination.  Nadine was given a copy of the Clinic Care Coordination brochure and full description of how to access their care team both during clinic hours and after hours.   My Care Guide's Name and Phone Number:  Sonam Bush, CHW;  [phone].     The Care Guide and myself agreed to be in contact every 2 weeks.      Medication Update  The patient's medication list is up to date per Dr. Lang    Health Maintenance and Immunization Update  The patient's preventive health screenings and immunizations are up to date per Dr. Lang..    My Emergency Plan  Emergency Plan  Depression  Everyone feels down at times. The blues are a natural part of life. But an unhappy period that s intense or lasts for more than a couple of weeks can be a sign of depression. Depression is a serious illness. It can disrupt the lives of family and friends. If you know someone you think may be depressed, find out what you can do to help.     Know the serious signals  Warning signals for suicide include:    Threats or talk of suicide    Statements such as  I won t be a problem much longer  or  Nothing matters     Giving away possessions or making a will or  arrangements    Buying a gun or other weapon    Sudden, unexplained cheerfulness or calm after a period of depression  If you notice any of these signs, get help right away. Call a health care professional, mental health clinic, or suicide hotline and ask what action to take. In an emergency, don t hesitate to call the police.     [facility] Mental Health Crisis Lines:  [facility] [phone]  [facility] [phone]  [facility] [phone]  [facility] [phone]  [facility], Adults [phone]  [facility], Children [phone]  [facility], Adults [phone]  [facility], Children [phone]        All [facility] clinic patients have access to a Nurse 24 hours a day, 7 days a week.  If you have questions or want advice from a Nurse, please know [facility] is here for you.  You can call your clinic and they will connect you or you can call Care Connection at [phone].  [facility] also has Walk In Care clinics in multiple locations.  Call the number listed  above for more information about our Walk In Care clinics or visit the [employer] website at [email or web address].    Patient Support  I will ask Sidney for help in supporting me in these goals  Relationship to patient: [email or web address]  Home # : [phone], best time to call after 3pm

## 2021-06-14 NOTE — PROGRESS NOTES
"Mental Health Visit Note    11/08/2017  Start time: 8:00am   Stop Time: 9:00am   Session #3    Nadine Black is a 27 y.o. female is being seen today for    Chief Complaint   Patient presents with      Follow Up     Patient presented for follow up psychotherapy appointment to address symptoms of anxiety and depression.   .     New symptoms or complaints: Increased worries about her roommate and housing. Continues to experience frequent nightmares and inability to sleep. Increased depression and anxiety symptoms     Functional Impairment:   Personal: 4  Family: 4  Work: 4  Social:4    Clinical assessment of mental status:   Grooming: Well groomed  Attire: Appropriate  Age: Appears Stated  Behavior Towards Examiner: Cooperative  Motor Activity: Within normal   Eye Contact: Fleeting  Mood: Anxious  Affect: Congruent w/content of speech, Anxious and Depressed  Speech/Language: Within normal and Soft  Attention: Hypervigilant  Concentration: Brief  Thought Process: Within normal  Thought Content: Hallucinations: Within noraml  Delusions: Within normal  Orientation: X 3  Memory: No Evidence of Impairment  Judgement: No Evidence of Impairment  Estimated Intelligence: Average  Demonstrated Insight: Adequate  Fund of Knowledge: adequate       Suicidal/Homicidal Ideation present: None Reported This Session    Patient's impression of their current status: Patient presented for follow up psychotherapy appointment. She referred to the father of her child as her \"boyfriend.\" This is the first time she has called him that in session and does not usually talk about their relationship. She reports things are going well with him and that he bought her the shoes she is wearing. She states he takes her to do enjoyable activities, like going to the [employer]. She report her baby is doing well, but she worries about the impact of lack of sleep on the baby. She reports her classes at [employer] are ending December 1. 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 She reports being concerned about her roommate as he appears stressed. She also has questions and worries about immigration concerns.     Therapist impression of patients current state: Patient continues to endorse symptoms of depression, anxiety and trauma. Patient continues to have inability to sleep. Therapist encouraged patient to disclose more about her inability to sleep to her PCP today. Therapist noted that it may be helpful for the PCP to understand that she experiences nightmares due to trauma history. Therapist empowered patient to be open with PCP in an effort to help solve her sleep concerns. Upon further inquiry, it appears she has struggled to stop researching about her country and frequently checking to see if she has contact from her family. She has not received any contact or updates from her family recently, which seems to increase her anxiety. However, when she does receive contact from them, she feels they are not being honest with her in a way of protecting her and decreasing her worries. Utilized CBT and insight approaches to further assess how much one can control. Explored how patient tends to take on many concerns (ie- how her family is, her roommate's stress) as she has a caring heart. However, noted how patient has many of her own stressors. It is concerning that patient's classes are ending December 1st as that is the positive thing she has in her daily life. She enjoys the classes and have teachers who express concern about her. She smiles and expresses happiness when she talks about school. 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 I think it would be beneficial for patient to work with clinic care guide to see if there are financial resources to assist with schooling costs or any  other classes/groups she could be involved in. Patient would also benefit from being connected with an  or agency that specializes in immigration as patient does not understand the process and the system, which increases her anxiety. Therapist recommended patient continue to practice deep breathing exercises and decrease checking the news and emails about concerns back in [place] in order to help decrease her anxiety and help improve her sleep.    Type of psychotherapeutic technique provided: Insight oriented, Client centered, CBT and Mindfulness/relaxation techniques    Progress toward short term goals:Progress as expected, maintaining follow up appointments and developing therapeutic relationship. Continues to have symptoms of depression and anxiety as well as symptoms characteristic of PTSD    Review of long term goals: Not done at today's visit and Treatment Plan effective 10/24/17-1/24/18.    Diagnosis:   1. Adjustment disorder with mixed anxiety and depressed mood    R/O PTSD  R/O Major Depressive Disorder, Single Episode    Plan and Follow up: Patient is scheduled for follow up psychotherapy services on November 17, 2017.      Discharge Criteria/Planning: Client has chronic symptoms and ongoing therapy for maintenance stability recommended.    WILL Sims 11/8/2017      This note was created with help of Dragon dictation software. Grammatical / typing errors are not intentional and inherent to the software.
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
## 2021-06-14 NOTE — PROGRESS NOTES
DALY  Forward to [facility] NABIL and Juliane Victor CCC RN for support.  Patient has RN Assessment with Juliane Victor CCC RN this Wednesday 11-15-17 to enroll in [facility].
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
## 2021-06-14 NOTE — PROGRESS NOTES
"Assessment  The pt was present for meeting with SW to discuss resources and supports. The pt is not able to work and does not qualify for county financial programs at this time due to her immigration status--the pt has already connected with legal services for support with this. The pt is pregnant and is due in March, 2018; pt has been staying with friends at this time in [place]. Due to pt having no income, pt is not eligible for low income housing rentals, will need to go to a shelter or transitional housing--pt reported that a shelter would be a, \"last resort\" for her. SW provided resources for housing programs as well as pregnancy/new mother support services. The pt seems capable and self sufficient, however, she has many barriers at this time and she may be feeling overwhelmed. Pt would benefit from connecting with one of the new mother/pregnancy services to gain additional support with stressors of pregnancy and assist with getting basic needs for baby.       Action Plan:    will:  1) Available as needed      Care Guide will:  Care Guide Delegation:   1)  Due Date:  None at this time       Delegation:    Subjective   The pt was present for meeting with SW to discuss resource and support. The pt is pregnant and is due in March, pt is not able to work due to her immigration status (pt already connected with a resource to assist for legal support with this)--also no eligible for county financial programs due to this. The pt has been staying with friends in [place] at this time, does not want to go to a shelter unless it is a, \"last resort.\" SW educated the pt that since she has no income, she will not be eligible for low-income housing rentals. SW suggested pt connected with Coordinated Access to Housing and Shelter ([facility]) to get on a wait list for housing programs. The pt agreed to this, SW called [employer] and was informed that in order to get [facility] for " families, the pt must wait until 14 days before her due date. In the mean time, NABIL was informed the pt could connect with the single adult [facility] line to get on a wait list. SW was given this information as well as 3 programs the pt can contact directly without needing to go through [facility] for a referral.     SW also asked [employer] worker about resources for baby supplies--crib, clothing, etc. Worker informed SW most of these programs go by area--need address with proof address such as mail. Worker was able to provide some resources that do not require this. SW encouraged the pt to reach out to these organizations ASAP as well as her [employer] as they may be able to assist with housing as well.    The pt has also already applied for WIC. SW encouraged pt to reach out to CG with any questions, CG will contact SW as needed to assist.    The following information was provided to pt on a paper document:    Housing    For Coordinated Access to Housing and Shelter ([facility])--for families needing housing, must wait to get onto list until 14 days before due date of baby.    [facility] single adults living outside: [phone]  [facility]  couch hopping  or  doubling up : [phone]    Programs that you can contact directly now/before birth of child:    [facility]  [address] [place] [address]  [phone]    [facility] and Living  Throughout [facility]  [phone]    [employer]   Throughout the [facility]  [phone]    Baby Supplies/Support    [facility]  [address] [place] [address]  9-5, M-F  [phone]    [facility]  [address] [place] [address]  M: 10-1, T, W, F: 10-2, Thursday: 10-6  [phone]    [facility]  May require proof of residency for address, will require ID  [address]  [place] [address]  8-4:30, M-F, 8-1:30 Saturday  [phone]    [facility]  Only receive larger items if you live in [place] area, but can  get clothing, diapers, forumulas otherwise--if not living in [place] area.    [address] [place] [address]  10-1 M-Saturday, T and TH: 5:30-8:30 PM  [phone]        Objective    Appearance: Casually and appropriately dressed, well-groomed, normal gait.       Behavior: Cooperative, alert, somewhat timid      Speech: Soft-spoken, clear, ordinary.      Emotion/Affect: Calm, pleasant      Thought Processes: Relevant      Orientation: observed x3      Memory: intact      General Intellectual Abilities: not assessed       Judgment: intact      Insight: intact      Clinical Recommendations: Due to the pt having no income and not qualifying for [facility] financial programs, the pt will struggle with getting housing, baby supplies and basic needs met. The pt would benefit from getting connected to local pregnancy/new mother programs to gain additional support as well as reaching out to private organizations she is affiliated with--such as her [employer].

## 2021-06-14 NOTE — PROGRESS NOTES
11-15-17  Met with patient in clinic.  Co-visit with Juliane Victor, CCC RN after RN assessment.  Patient gave verbal permission for CHW Intern Jessie to be present.    Re-introduce self as clinic Care Guide and explained the next step to enrolling in [facility] after RN assessment.  Scheduled goal setting with Dr Lang on 11-21-17 at 9:40am.    Patient signed JAS to communicate with [facility] Familia Salas RN PHN and DHS Release to communicate regarding insurance issues.      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

## 2021-06-14 NOTE — PROGRESS NOTES
No ctx, lof, bleeding, or discharge.  No HA or vision changes.  Good FM : yes  Her boyfriend is with her today.  He does not step out of the room.  He is the same person who earlier this pregnancy said he was just a friend.    Labs/imaging reviewed: wnl    Plan today:   Met with [facility] for goal setting meeting. Please see their note for further details.   We will refer to  for a bundle of love  Continue close follow-up every 2 weeks.  We will need to complete 1 hour GTT and 28 week labs at next visit.      Alise Lang

## 2021-06-14 NOTE — PROGRESS NOTES
11-21-17  Goal setting with patient and PCP.    Met with patient and patient's boyfriend (Sidney). Patient okay to talk in front of boyfriend about goals.   Reviewed goals from RN Assessment with patient and Dr. Lang.  Patient confirmed goals.  Added goals per PCP:  Healthy pregnancy-follow up to OB visit every 2 weeks, follow up with WIC program        Care Guide provided patient contact information to [employer] [facility] at [address], [place] [address]; [phone] for legal consultation about her immigration status.    PCP will refer patient to [employer] -Saundra Padilla to sign up for [place] [employer].      Background information:  -enrolled in WIC Program  -has Jessie Salas RN PHN from [facility] for support  -[employer] provide bus card to go to school  -attending classes [employer]   -F-1 Visa status, Visa expires May 2019  -no income.  Not eligible for [facility] benefit ([facility] [place]) due to immigration status  -lives with boyfriend's friend family  -father was the sponsor in her country  -thinking of moving to [place] but not sure yet      Plan:   follow up 2 weeks in clinic on goals.  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

## 2021-06-14 NOTE — PROGRESS NOTES
11-20-17  Called patient and left voice message reminding of tomorrow goal setting visit with PCP at 9:40am at [facility].    Goal Setting tomorrow with patient and PCP 9:40am

## 2021-06-14 NOTE — PROGRESS NOTES
Mental Health Visit Note    12/01/2017  Start time: 9:15am   Stop Time: 10:00am   Session #5    Nadine Kilgore is a 27 y.o. female is being seen today for    Chief Complaint   Patient presents with     MH Follow Up     Patient presented for follow up psychotherapy appointment to address symptoms of depression, anxiety, and trauma.    .     New symptoms or complaints: Sleeping better. Anxious/Fear about working with . Sad about CNA classes ending.     Functional Impairment:   Personal: 4  Family: 4  Work: 4  Social:4    Clinical assessment of mental status:   Grooming: Well groomed  Attire: Appropriate  Age: Appears Stated  Behavior Towards Examiner: Cooperative  Motor Activity: Within normal   Eye Contact: Fleeting  Mood: Anxious  Affect: Congruent w/content of speech, Anxious and Depressed  Speech/Language: Within normal and Soft  Attention: Hypervigilant  Concentration: Brief  Thought Process: Within normal  Thought Content: Hallucinations: Within noraml  Delusions: Within normal  Orientation: X 3  Memory: No Evidence of Impairment  Judgement: No Evidence of Impairment  Estimated Intelligence: Average  Demonstrated Insight: Adequate  Fund of Knowledge: adequate       Suicidal/Homicidal Ideation present: None Reported This Session    Patient's impression of their current status: Patient presented for follow up psychotherapy appointment. Patient reports improvement in her sleeping. She has been sleeping about 6 hours/night.  She has been taking the prescribed medication as needed. She reports feelings of anxiety and fear. Time was spent exploring the origin of those feelings and ways to manage symptoms. Patient requested to call  during session as she notes a decrease in anxiety if feeling supported during the call. Attempted call, but there was no one in the office. Reviewed the contact times provided on the messaging symptom- time was spent empowering patient to call  this afternoon or on Monday when they reopen.  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 She notes she does not want to be alone during the days as she thinks too much.     Therapist impression of patients current state: Patient continues to endorse symptoms of depression, anxiety and trauma. Empathic listening about patient's presenting concerns and symptoms. Joined with patient as she requested support during a phone call. It is difficult for patient to trust others, especially with her story. There is evidence of a strong therapeutic relationship between patient and therapist as she is open in session and trusting of therapist to provide support. Patient continues to lack social supports and socialization. 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 Time was spent brainstorming enjoyable activities that patient could engage in now that she won't have class to attend daily. It is of concern that her symptoms will increase if she is not engaging in activities or socialization. Patient and therapist worked to develop some ideas: maternity classes, mom support group, mall, library, Hol[employer], bible study. Encouraged patient to utilize her resources, such as talking to one of the teachers she trusts about any other ways to stay connected with the center. Therapist continues to encourage patient to engage in activities to distract self from thinking too much about concerns back home as well as limiting her research about it as it further increases her thoughts. Therapist and patient continue to restructure cognitive distortions around feelings of guilt.    Type of psychotherapeutic technique provided: Insight oriented, Client centered, Solution-focused and CBT    Progress toward short term goals:Progress as expected, maintaining follow up appointments and developing therapeutic relationship. Continues to have  symptoms of depression and anxiety as well as symptoms characteristic of PTSD    Review of long term goals: Not done at today's visit and Treatment Plan effective 10/24/17-1/24/18.    Diagnosis:   1. Adjustment disorder with mixed anxiety and depressed mood    R/O PTSD  R/O Major Depressive Disorder, Single Episode    Plan and Follow up: Patient is scheduled for follow up psychotherapy services on 12/15/2017.      Discharge Criteria/Planning: Client has chronic symptoms and ongoing therapy for maintenance stability recommended.    WILL Sims 12/1/2017      This note was created with help of Dragon dictation software. Grammatical / typing errors are not intentional and inherent to the software.
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
## 2021-06-15 NOTE — PROGRESS NOTES
Nadine came in to see NABIL today. She is pregnant and her due date is 3/5/2018. She has a lot of stressors in her life at the moment. She is working with an  to resolve her situation. She has no source of income and is staying with friends. Her S.O only works part time. She is on WIC. NABIL referred her for a car seat and breast pump through [employer]. She is thinking about  services. NABIL completed an application through [facility] for a cribette. She thinks she is having a girl and will come see NABIL at the end of January or early February for a girl bundle and check out need for other financial resources. She is on WIC, and has a PHN through the [facility].

## 2021-06-15 NOTE — PROGRESS NOTES
No ctx, lof, bleeding, or concerning discharge.  No HA or vision changes.  Has a little bit of increased discharge but is the same.  None irritating/itchy no foul smells or strange color.  She is missing her family.  She was alone on Christmas and he be alone on New Year's.  Met with public health nurse prior to her appointment.  States this is going well.  She is also following up with Yen  Having pelvic pain bilaterally. No concerning symptoms as above. Does not have support belt.  Good FM : yes.     Labs/imaging reviewed: 28 week labs within normal limits.    Plan today:   Weight gain is 25 lbs at this time.  Discussed healthy diet and exercise.  She feels good when she exercises.  Lumbar support belt.  Provide reassurance and discussed round ligament pain.  Discussed signs and symptoms watch out for.  Handout given.  Follow-up in 2 weeks.  Continuing close follow-up given social factors.  Continue with public health nurse and therapy.    Alise Lang
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
## 2021-06-15 NOTE — PROGRESS NOTES
1-10-18  Met with patient in clinic and follow up on goals and action steps.  Coordinated and patient met with Dana Tovar RN from [facility] in clinic to follow up her pregnancy plan.    Patient reported:  -glad she has the medical transportation through [employer].  -has pack and play crib  -not eligible for [facility] because she is not working.  Interested in attending CNA program  Educated patient about CNA program at [facility].  Will connect with care guide next office visit to call to [facility]    Plan:   support to connect with [facility] regarding CNA Program

## 2021-06-15 NOTE — PROGRESS NOTES
No ctx, lof, bleeding, or discharge.  No HA or vision changes.  Keith was running late so she decided to take the train and [place] to the train station.  Then she got a call from the [employer] that had arrived so she ran back to her house.  During this time running she felt pelvic cramping.  The cramping stopped after she was done running.  Does not have the cramping currently.  But states it cramping occurs again when she is walking.  No vaginal bleeding gush of fluid or discharge.  Good FM: Yes    Plan today:   Refill Benadryl today to help her sleep.  Discussed the cramping sounds musculoskeletal since it happens while she is being active.  She should stretch, walk and exercise a little bit more.  We have also discussed this in regard to her weight gain this pregnancy.  Has already gained 28 pounds.  She is feeling very self-conscious about it.  Discussed that if the cramping becomes more constant and at rest then we would be concerned that there small contractions.  And she should let me know or be evaluated.  Discussed pain management in labor at length.  Patient had lots of questions.  Answered questions to patient satisfaction.  Follow-up in 2 weeks.      Alise Lang
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
## 2021-06-15 NOTE — PROGRESS NOTES
12-27-17  Met with patient and Jaison Armas RN, PHN in clinic and follow up on goals and action steps.  Patient reported:  -still living with boyfriend's friend apartment. Will need to find new place to live when baby is born  -met with [employer] staff on 12-18-17 for support to seek asylum. Staff will call in January to verify story and evidence. If story verify, then they will help to file. Stated will contact sister back home for support to get evidence.   -enrolled in WIC through [facility].  -attended parenting classes at [address] [place] [address]  -working with MICHAEL for support find housing through [address] [place] [address]  -on waiting list with [facility]  -getting help from [facility] for pregnancy services  -established dental care at [facility] on [address] On 12-6-17  -will get infant car seat from [address] [place] [address] and NATHALY Barbosa PHN will help to get toddler car seat     Care Guide look for resources for crib or pack and play.    Provided patient information and intake form to [facility] for housing.   [address] [place] [address] phone: [phone] fax: [phone]      Patient will contact [facility].    Plan:  Follow up 2 weeks in clinic 1-10-18 with LIAM Barbosa, RN
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
## 2021-06-16 PROBLEM — R04.2 HEMOPTYSIS: Status: ACTIVE | Noted: 2019-10-04

## 2021-06-16 PROBLEM — G56.00 CARPAL TUNNEL SYNDROME: Status: ACTIVE | Noted: 2018-04-02

## 2021-06-16 PROBLEM — F32.1 MODERATE SINGLE CURRENT EPISODE OF MAJOR DEPRESSIVE DISORDER (H): Status: ACTIVE | Noted: 2018-07-25

## 2021-06-16 PROBLEM — Z63.4 BEREAVEMENT, UNCOMPLICATED: Status: ACTIVE | Noted: 2018-11-14

## 2021-06-16 NOTE — PROGRESS NOTES
Nadine Kilgore is a 31 y.o. female who is being evaluated via a billable video visit.      How would you like to obtain your AVS? VSS [employer].  If dropped from the video visit, the video invitation should be resent by: pt will access video visit through [employer].  Will anyone else be joining your video visit? No      Video Start Time: 10:39 AM    Assessment & Plan     Nadine was seen today for follow up covid symtom-tested negative 4/3.    Diagnoses and all orders for this visit:    Fever, unspecified fever cause:   Congestion of nasal sinus  -     Discontinue: pseudoephedrine (SUDAFED) 120 mg 12 hr tablet; Take 1 tablet (120 mg total) by mouth 2 (two) times a day as needed for congestion.  -     pseudoephedrine (SUDAFED) 120 mg 12 hr tablet; Take 1 tablet (120 mg total) by mouth 2 (two) times a day as needed for congestion.  -     acetaminophen (TYLENOL) 325 MG tablet; Take 2 tablets (650 mg total) by mouth every 6 (six) hours as needed for pain.  Sore throat    Constellation of symptoms likely viral infection as daughter is sick with same symptoms (although appeared well on video today). Mom had messaged me for a COVID test and they both tested negative on Friday. Could be influenzae or strep throat or common cold. Nadine does not want to do any more nasal swabs and is not interested in getting tested for these. I recommended symptom relief with rest, hydration, tylenol, ibuprofen and will add sudafed. If symptoms worsen or do not improve will need to be seen or consider tests.            No follow-ups on file.    Alise Lang DO  [facility]    Subjective   Nadine Kilgroe is 31 y.o. and presents today for the following health issues   HPI   Fever and runny nose  i'm not feeling good  HA, and tired  Temp 99.6  + chills  No cough  +sore throat  + sinus stuff  Her daughter is also sick although appears well on the video visit today. She Can return to day care on Tuesday- but with all the  symptoms can't go back until symptosm go away.     Symptoms  + Fever  + Chills   + Headache  + Sore throat  + Loss of sense of smell/taste  + Nasal congestion/runny nose  - Cough   - Breathing problems  - Nausea/vomiting   - Diarrhea  - Abdominal pain  - Muscle pain  + Fatigue    Review of Systems  As above      Objective        Vitals:  No vitals were obtained today due to virtual visit.    Physical Exam  Gen: she appears tired and ill although non toxic, drinking tea, daughter on her lap. She is slow to respond. It appears her throat hurts  REsp: no s/s of resp distress. No cough noted. Did not blow her nose      Video-Visit Details    Type of service:  Video Visit    Video End Time (time video stopped): 10:56 AM  Originating Location (pt. Location): Home    Distant Location (provider location):  [facility]     Platform used for Video Visit: Elda

## 2021-06-16 NOTE — PROGRESS NOTES
18 Patient referred to FRG on  but did not need assistance.   No further FRG outreach needed at this time. I am available for assistance if patient has a new need for FRG outreach-  patient or Care Guide may notify me.                  Lety Liao, Phone: [phone], Fax: [phone]    Shanita Kilgore Nadine - RE: Need help with insurance More Detail >>     RE: Need help with insurance     Sonam Bush     Sent:   9:04 AM     To: Lety Liao Armdel Kilgore     MRN: [id number] :     1990     Pt Home: [phone]              Message      I spoke and she said she does not know why she is seeing keyanna Arguello and that her insurance is okay. So am not sure who schedule it or if it was a mistake.     I will confirm and verify who schedule it. unless INS means something else not insurance     Sonam     ----- Message -----        From: Lety Liao        Sent: 2018   2:18 PM          To: Sonam Bush     Subject: RE: Need help with insurance                            at 11:30? Yes, I can. Sorry for not getting back to you right away. I will be coming from [facility] but should be done there before 11. Let me know if this still works!          Thanks.      Lety GOFF      ----- Message -----        From: Sonam Bush        Sent: 2018  10:31 AM          To: Clinic Financial Resource Guide Pool     Subject: Need help with insurance                               Female, pregnant, English speaking from [place].     Came to [place] on student visa status     Seeking help from [employer] to file for asylum.     Homeless living with boyfriend's friend's house.      has no money     Not eligible for [place] or MFIP due to immigration status.           staff scheduled with Saundra Padilla, clinic  this Friday at 11:30am for help with insurance. They did not know she is enrolled [employer] and schedule her with Saundra.          Would you be able to  see her this Friday at 11:30am?          Aun

## 2021-06-16 NOTE — PROGRESS NOTES
"[facility] SW called pt to discuss current housing needs.    The pt reported that she is currently staying at a friend's home who has offered to help her for \"a while.\" SW asked the pt if she has connected to [facility] to Housing and Shelter ([facility]) to get on shelter/housing wait list and the pt reported she has. The pt reported that she didn't want to go to the shelters that had placement as she would, \"have to leave during the day and could only be there in the evening and at nighttime.\" SW educated the pt that this is how the majority of shelters work. The pt reported that this could not work for her and stated, \"why would I do that when I could stay somewhere else all day?\"     The pt reported that she would prefer a, \"Islam setting\" and \"be around other Christians.\" SW educated the pt that there are some Islam-based shelters, however, the shelters all [facility] so placement is based on who has openings. SW discussed [facility] and the pt reported she has already contacted [facility] and doesn't qualify for placement due to having no income--shelter requires people to be employed.    SW educated the pt on income-based housing and the application and wait list process, however, noted pt's barrier that she does not have an income. Pt asked about her boyfriend applying \"for her.\" SW educated the pt that the boyfriend could apply as the head of household and include her and the baby in the application but he would not be applying for her he would be applying for himself. NABIL informed the pt she does not qualify for public housing at this time due to the household size--only accepting applications for single adults or families needing 5 bedrooms.     SW encouraged the pt to re-consider going to a shelter through [facility] as shelters will have more access to housing resources and possibly be able to get the pt into transitional housing. SW asked the pt if she would be " "willing to try shelters outside of [place] to which the pt reported she could not do this as she needs to be near her boyfriend. SW had the impression the pt was not open to going to a shelter at this time. SW and SW intern unable to find any alternative \"[facility]\" housing/shelters. Pt reported her [employer] doesn't have a program for this.  "

## 2021-06-16 NOTE — PROGRESS NOTES
2-28-18  Met with patient in clinic and follow up on goals and action steps.  Patient reported:  -worry about health insurance after she gives birth to the baby.  -worry about housing has to move out by 3-8-18. Can't live with boyfriend's friend anymore they are renewing the lease. Will be homeless after 3-8-18  -boyfriend (Sidney) had a stroke and recently out of the hospital and staying a facilities.    Provided patient housing resources and to contact [facility] to [facility] and Shelter ([facility]) [phone] to request for help with housing.  Informed patient [facility] will be connecting with her regarding health insurance.    Plan:  meet with patient in clinic 3-7-18 at 2pm follow up on housing.        Sent referral to [facility] for support with insurance.  Sent staff message to Nurys Lawson, [facility] NABIL for support with housing.    SW responded to have patient call [employer].

## 2021-06-16 NOTE — PROGRESS NOTES
3-7-18  Met with patient in clinic and follow up on goals and action steps.  Boyfriend (Sidney) attended today's appt.  Patient reported:  -called to [employer] about housing  and was instructed to call to [employer] 2x a day and if she does not have housing when baby is born to talk to hospital .       Plan:   2 weeks follow up

## 2021-06-16 NOTE — PROGRESS NOTES
Mental Health Visit Note    3/28/2018  Start time: 10:20am   Stop Time: 10:55am   Session #5    Nadine Kilgore is a 28 y.o. female is being seen today for    Chief Complaint   Patient presents with     MH Follow Up     Patient presented for follow up psychotherapy appointment to address symptoms of PTSD and depression.   .     New symptoms or complaints: Delivered her baby. Adjusting well.     Functional Impairment:   Personal: 4  Family: 4  Work: 4  Social:4    Clinical assessment of mental status:   Grooming: Well groomed  Attire: Appropriate  Age: Appears Stated  Behavior Towards Examiner: Cooperative  Motor Activity: Within normal   Eye Contact: Appropriate  Mood: Euthymic  Affect: Congruent w/content of speech  Speech/Language: Within normal  Attention: Hypervigilant  Concentration: Brief  Thought Process: Within normal  Thought Content: Hallucinations: Within noraml  Delusions: Within normal  Orientation: X 3  Memory: No Evidence of Impairment  Judgement: No Evidence of Impairment  Estimated Intelligence: Average  Demonstrated Insight: Adequate  Fund of Knowledge: adequate       Suicidal/Homicidal Ideation present: None Reported This Session    Patient's impression of their current status:  Patient reports she had a good birthing experience and it went according to her birth plan. She stated she used the breathing and mindfulness techniques practiced in sessions and they were extremely helpful during labor. She and baby are doing well. Baby is nursing and they are attaching well. Patient reports she is happy. She denies any symptoms of post partum depression. Denies any increase in anxiety. She reports she is tired, but baby sleeps well. She is currently living with a friend's mother who is also from [facility]. She does not want to stay in a shelter as she feels it will make her feel more depressed. She would also be worried about the need to be out during the day and not having a place to be in the  daytime hours if she were to stay at a shelter.    Therapist impression of patients current state: Patient presented for follow up psychotherapy appointment with her  baby girl. Patient and baby were also in to see PCP today. Patient was well groomed and smiling. She expressed her happiness and love for her baby. She appears to be adjusting very well to motherhood and is enjoying her new role. Reviewed symptoms of post partum depression for psychoeducation. Therapist encouraged patient to monitor for any signs or symptoms and to follow up with PCP or therapist if any concerns arise. Patient appears to be attaching very well with her baby. She does not present with anxiety or hypervigilance today as she is very tuned into her baby. She does have worries about housing, but her degree of worry is not beyond what would be considered normal for being homeless. She does have a safe place to stay currently and is happy there. She feels supported by her boyfriend and he was able to be at the birth as well. Therefore, her interpersonal relationships appear to be going well.     Type of psychotherapeutic technique provided: Insight oriented, Client centered and Solution-focused, psychoeducation    Progress toward short term goals:Progress as expected, adjusting well to motherhood and feeling happy.     Review of long term goals: Not done at today's visit and Treatment Plan effective 2018-2018.    Diagnosis:   1. PTSD (post-traumatic stress disorder)        Plan and Follow up: Patient to follow up for psychotherapy in 2-3 weeks.       Discharge Criteria/Planning: Client has chronic symptoms and ongoing therapy for maintenance stability recommended.    WILL Sims 3/28/2018
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
## 2021-06-16 NOTE — PROGRESS NOTES
Dr Lang notified of BPP 8/8, and reactive NST. Notified of JOSE of 6.3, and periods of decreased variability. Notified of occasional variable decel. Dr Lang said her office will call patient at home tomorrow, and schedule her next BPP, and next prenatal visit. Reviewed AVS with patient and S.O. They verbalize agreement and understanding of plan

## 2021-06-16 NOTE — PROGRESS NOTES
Mental Health Visit Note    2/28/2018  Start time: 9:00am   Stop Time: 9:58am   Session #4    Nadine Kilgore is a 27 y.o. female is being seen today for    Chief Complaint   Patient presents with     MH Follow Up     Patient presented for follow up psychotherapy appointment to address symptoms of PTSD, depression and anxiety.   .     New symptoms or complaints: Stress. Sadness. Worry about housing and boyfriend's health. Trauma triggers.      Functional Impairment:   Personal: 4  Family: 4  Work: 4  Social:4    Clinical assessment of mental status:   Grooming: Well groomed  Attire: Appropriate  Age: Appears Stated  Behavior Towards Examiner: Cooperative  Motor Activity: Within normal   Eye Contact: Appropriate- fleeting at times  Mood: Anxious  Affect: Congruent w/content of speech, Anxious and Depressed  Speech/Language: Within normal and Soft  Attention: Hypervigilant  Concentration: Brief  Thought Process: Within normal  Thought Content: Hallucinations: Within noraml  Delusions: Within normal  Orientation: X 3  Memory: No Evidence of Impairment  Judgement: No Evidence of Impairment  Estimated Intelligence: Average  Demonstrated Insight: Adequate  Fund of Knowledge: adequate       Suicidal/Homicidal Ideation present: None Reported This Session    Patient's impression of their current status:  Patient reports feeling stressed and sad. Her stress level ranges from 5-10/10. She is concerned about housing as she does not know where she will live after 3/8/18. She is less stressed about her boyfriend's health as he was discharged from the hospital and nursing home. He is back at his home and starting radiation 3 days/week. She attends these appointments with him. She worries about his health and expresses much care for him. She noted that he was the one who recommended she see a therapist as he felt she may need more support to talk about things since she does not have family support in this country. She  reports sadness that she does not show. She feels she has to be strong; that is one of her identified strengths. She expressed how she does not like to think or talk about things that are difficult or bring feelings of sadness. She would rather avoid/ignore it and try to focus on other things. The sadness is still there. The memories from traumatic experiences still come up at times. She reports how people will due things that trigger a memory. She will find herself experiencing a flashback. She struggles when she waits in the lobby for her appointment. She continues to struggle when the door is shut at appointments. The act of the door being shut is hard for her. She worries if someone will enter the room. It is easier for her when she is with the doctor and the curtain is drawn so she cannot see the door. She reports certain mannerisms people do will trigger a memory. She listens to hymns and sings to help her cope, such as in the lobby prior to this appointment. The hymns remind her of her mother.     Therapist impression of patients current state: Patient presented for follow up psychotherapy appointment. Patient continues to endorse symptoms of PTSD, depression and anxiety. Therapist provided some resources for housing, such as the phone number for Coordinated Access to Housing and Shelter. Patient has called many housing places and [employer], but has not engaged in much follow up after initial attempts. There may be housing availability that arises, but she has not continued follow ups. Encouraged to call [employer] again and also provided contact information for [facility]. Therapist notified clinic care guide of concerns and she plans to follow up with patient today. Patient continues to show how she is strong and resilient. She is 39 weeks pregnant and providing care for her sick boyfriend and attending his radiation appointments with him. She is putting her own needs aside and focusing on him.  Therapist continued to encourage self care to help manage stress and anxiety. Patient plans to listen to her hymns for coping and relaxation. It does not appear patient is ready to treat her trauma symptoms at this time. I also do not feel it is an appropriate time with so many basic needs that are not met and the arrival of her baby at any time. We did explore what it would be like for her to be the one to shut the door when she comes into the office- if that would allow her to feel a sense of power and control that she did not have when she was trapped in the room with the men before. That may be empowering for her and help with some of the healing. Provided education on exposure therapy. Patient is hesitant and resistant to discuss and treat the trauma at this time.     Type of psychotherapeutic technique provided: Insight oriented, Client centered, Solution-focused and CBT, psychoeducation    Progress toward short term goals:Progress as expected, coping wiht a lot of stress, but compartmentalizing. Listening to hyms for coping. Plans to engage in more self care and relaxation moments.     Review of long term goals: Not done at today's visit and Treatment Plan effective 2/09/2018-05/09/2018.    Diagnosis:   1. PTSD (post-traumatic stress disorder)    2. Single current episode of major depressive disorder, unspecified depression episode severity        Plan and Follow up: Patient is scheduled for follow up psychotherapy appointment on 3/6/18. If she delivers her baby by that time or is in the hospital, we can just cancel the appointment. Also scheduled an appointment for the end of March to come back after baby is born.        Discharge Criteria/Planning: Client has chronic symptoms and ongoing therapy for maintenance stability recommended.    WILL Sims 2/28/2018

## 2021-06-16 NOTE — PROGRESS NOTES
Mental Health Visit Note    2/21/2018  Start time: 9:20am   Stop Time: 10:03am   Session #3    Nadine Kilgore is a 27 y.o. female is being seen today for    Chief Complaint   Patient presents with     MH Follow Up     Patient presented for follow up psychotherapy appointment to addresss symptoms of PTSD, depression and anxiety.   .     New symptoms or complaints: boyfriend's health concerns causing much stress.     Functional Impairment:   Personal: 4  Family: 4  Work: 4  Social:4    Clinical assessment of mental status:   Grooming: Well groomed  Attire: Appropriate  Age: Appears Stated  Behavior Towards Examiner: Cooperative  Motor Activity: Within normal   Eye Contact: Appropriate  Mood: Anxious  Affect: Congruent w/content of speech, Tearful, Anxious and Depressed  Speech/Language: Within normal and Soft  Attention: Distractible  Concentration: Brief  Thought Process: Within normal  Thought Content: Hallucinations: Within noraml  Delusions: Within normal  Orientation: X 3  Memory: No Evidence of Impairment  Judgement: No Evidence of Impairment  Estimated Intelligence: Average  Demonstrated Insight: Adequate  Fund of Knowledge: adequate       Suicidal/Homicidal Ideation present: None Reported This Session    Patient's impression of their current status: Patient presented for follow up psychotherapy appointment. She expressed much concern about her boyfriend. She reports he has a history of cancer and recently had a stroke. She is concerned about a tumor in his brain now. He has been in the hospital for over 10 days and she is spending every day and night with him. She feels he needs her strength and support at this time. He is her priority currently. She expresses much care and love for him. She worries about his health and if he will die. She worries if he goes to [facility] for more natural treatment, she may never see him again.    Therapist impression of patients current state: Patient continues to  "endorse symptoms of depression and anxiety. Patient hasn't shared any of this with provider before. She felt it was her boyfriend's business and didn't want to involve him. However, it has impacted her as it causes much stress and worry on her. Discussed the importance of self-care: walks, talking to other supports, meditation/breathing techniques, sleep. Patient has been trying to implement mindfulness skills- thinking in the moment, not worrying about the future and \"what ifs\", such as if he goes to [place]. She has been redirecting her thoughts when they arise, which shows much improvement and use of skills outside of session. Patient plans to implement more self-care techniques prior to next session.    Type of psychotherapeutic technique provided: Insight oriented, Client centered, CBT and mindfulness-based practices    Progress toward short term goals:Poor progress, not engaging in self care. Her focus is currently on being a caregiver for her boyfriend due to his health conditions..    Review of long term goals: Not done at today's visit and Treatment Plan effective 2/09/2018-05/09/2018.    Diagnosis:   1. PTSD (post-traumatic stress disorder)       R/O Major Depressive Disorder, Single Episode    Plan and Follow up: Patient is scheduled for follow up psychotherapy appointment on 2/28/18.      Discharge Criteria/Planning: Client has chronic symptoms and ongoing therapy for maintenance stability recommended.    WILL Sims 2/21/2018    "

## 2021-06-16 NOTE — PROGRESS NOTES
03/15/18 Called patient and explained after baby is born she should apply for SNAP and cash assistance and add her baby to MA. Let her know that baby will be the one possibly eligible for these programs but not her at this time. Once her paperwork is filed for asylum if she can provide verification she may be able to receive benefits for herself such as SNAP, cash and MA but not sure if all of them or just MA.     18 Patient not eligible for SNAP, cash assistance or regular MA at this time. Baby will be eligible for SNAP and possibly cash assistance once child is born. Baby will be eligible for MA until first birthday automatically. Patient is concerned, frg will reach out to patient in the morning to give her this information. Patient may possibly eligible for benefits such as MA and SNAP as she is requesting Asylum in [place] but unsure what is needed to verify this information or when in the process she will become eligible. Patient has MA for pregnancy at this time and it will last for 2 months post partum after baby is born.         18 Referral:     Nadine Boland - Insurance after pregnancy More Detail >>     Insurance after pregnancy     Sonam Bush     Sent:  10:59 AM     To: [facility] Financial Resource Guide Pool     Cc: NABIL Barakat     MRN: [id number] :     1990     Pt Home: [phone]              Message      Patient from [facility].     Patient came to [place] on F-1 visa, working with [employer] to file for asylum.     Patient is pregnant, currently has MA because of the baby.      -homeless living with boy friend's friend apartment.     -has no money     - has to move out of boyfriend's apartment by 3-8-18           She is worry about insurance after she gives birth.     Requesting help with insurance and housing.          Sonam

## 2021-06-16 NOTE — PROGRESS NOTES
Mental Health Visit Note    3/7/2018  Start time: 1:14pm  Stop Time: 2:00pm   Session #5    Nadine Kilgore is a 27 y.o. female is being seen today for    Chief Complaint   Patient presents with     MH Follow Up     Patient presented for follow up psychotherapy appointment to address symptoms of PTSD, depression and anxiety.   .     New symptoms or complaints: Continues to have concerns about housing (homeless). Due with baby.    Functional Impairment:   Personal: 4  Family: 4  Work: 4  Social:4    Clinical assessment of mental status:   Grooming: Well groomed  Attire: Appropriate  Age: Appears Stated  Behavior Towards Examiner: Cooperative  Motor Activity: Within normal   Eye Contact: Appropriate- fleeting at times  Mood: Anxious  Affect: Congruent w/content of speech, Anxious and Depressed  Speech/Language: Within normal and Soft  Attention: Hypervigilant  Concentration: Brief  Thought Process: Within normal  Thought Content: Hallucinations: Within noraml  Delusions: Within normal  Orientation: X 3  Memory: No Evidence of Impairment  Judgement: No Evidence of Impairment  Estimated Intelligence: Average  Demonstrated Insight: Adequate  Fund of Knowledge: adequate       Suicidal/Homicidal Ideation present: None Reported This Session    Patient's impression of their current status:  Patient reports feeling stressed about housing as she will no longer have her apartment after tomorrow. She has been able to stay with her boyfriend to help care for him after his stroke. However, that is temporary as he lives in [employer] [facility] for [facility]. Patient is feeling more hopeful about labor and delivery. She is due with her baby, but denies any signs of labor. She would like to work on relaxation and breathing techniques to help her in labor and managing stressors. Her partner/boyfriend came today, but he is waiting in the waiting room. He will attend or OB appointment today with her.     Therapist impression of  patients current state: Patient presented for follow up psychotherapy appointment. Patient continues to endorse symptoms of PTSD, depression and anxiety. Empowered patient to shut the door in the office for the session to allow her that power. Engaged in a deep breathing guided meditation. Patient was very receptive to the practice. She utilized a yoga mat on the floor to find a comfortable position and was able to stretch, move, and breath deeply. She appeared in a state of relaxation and expressed how she found it helpful. Her hypervigilance decreased as noted by her ability to close her eyes during the practice. She was able to identify some techniques and positions that my be helpful for her while in labor. Encouraged continued practice of deep breathing, guided meditation and stretching. These practices would also help her with sleep concerns and the impact of PTSD. Therapist would like to continue mindfulness-based practices and somatic experiencing in session for patient to gain a sense of mastery and be able to implement skills outside of session. Patient plans to work closely with the hospital  about housing and insurance coverage questions. She continues to address those concerns with the CCC team as well.    Type of psychotherapeutic technique provided: Insight oriented, Client centered, Solution-focused and mindfulness-based.,     Progress toward short term goals:Progress as expected, continues to cope well with stress- very resilient and strong. Encouraged time for self-care and relaxation techniques. Continue to practice deep breathing/meditation practice.    Review of long term goals: Not done at today's visit and Treatment Plan effective 2/09/2018-05/09/2018.    Diagnosis:   1. PTSD (post-traumatic stress disorder)    2. Single current episode of major depressive disorder, unspecified depression episode severity        Plan and Follow up: Patient is scheduled for follow up psychotherapy  appointment on 3/28/18.       Discharge Criteria/Planning: Client has chronic symptoms and ongoing therapy for maintenance stability recommended.    WILL Sims 3/7/2018

## 2021-06-16 NOTE — PROGRESS NOTES
No ctx, lof, bleeding, or discharge.  No HA or vision changes.  Good FM yes  Pregnancy is going well.   As far as her SO goes, he is doing better. Has moved out of the ICU. Is stable. Thinking he may go to rehab at the end of this week. Starting radiation next week. She is at the hospital day and night.   Taxi vouchers given     Labs/imaging: US with appropriate fetal weight in 55%    Plan today:  Patient requested another SVE: minimal change- outer cervix a little bit more open-finger tip.   Reviewed s/s of labor and when to call.   F/u in 1 week     Alise Lang

## 2021-06-16 NOTE — PROGRESS NOTES
No ctx, lof, bleeding, or discharge.  No HA or vision changes.  Good FM: Yes  States things with the pregnancy are going well.  Her significant other Sidney has been discharged from the hospital and is currently in his home.  Seen at [facility].  She has been taking care of him.  He is getting radiation started yesterday and for the next couple days.  She is wondering about housing concerns for herself.  Currently staying with someone from [place] but does not want to keep staying there after the baby is born.  Will informed that care guidance.  Also states that the gets of human rights were working on her silent case will be contacting the clinic for medical records.  She went to make sure that they just get her records and that her baby needs records.    Plan today:   Reviewed signs and symptoms of labor and monitoring for fetal movement.  Reviewed indications for induction and risks benefits of going postdates.  Reviewed cervical ripening and augmentation with Pitocin.  Answered all questions to satisfaction.  Follow-up in 1 week.    Alise Lang

## 2021-06-16 NOTE — PROGRESS NOTES
"[place] [facility] [place], and BPP. States she had a \"non-reactive NST in the clinic today.\"  In bed with EFM on. Accompanied by TEJAL Little.  "

## 2021-06-16 NOTE — PROGRESS NOTES
"Plan was for BPP/NST today and induction tomorrow on 3/17 when she will be 41w5d. Per RN  Patient was refusing induction tomorrow and wanted to come back on Tuesday when she will be 42w1d. RN attempted to discuss risks with patient and patient even stated back to her \"yes I know, increase of stillbirth\".     I was transferred to patient's room and talked to her over the phone. Patient stated to me \"I don't know why I should force this baby out when she looks good right now. \" I reminded her of the graph I drew in clinic with how the risks to baby including risk of stillbirth increase as we get closer to 42 weeks and then at 42 weeks they drastically increase. She states she will be \"so much calmer and ready to welcome her into the world if she's not using medications to force her out \". She wants a natural birth. Attempted to discuss induction methods again but she cut me off. She wanted induction on Tuesday but I said she has to come in on Monday when she will be at 42 weeks so that we can monitor baby and start induciton. Patient still hesitant but agreeable to induction starting Monday night. She refuses to come in before this.      BPP was 10/10 today. JOSE 6.9.      Alise Lang    "

## 2021-06-16 NOTE — PROGRESS NOTES
No ctx, lof, bleeding, or discharge.  No HA or vision changes.  Good FM: Yes!  She presents with her partner Sidney today.  We are all very excited to see him.  He looks pretty good for just having a stroke.  He looks tired and is frequently shutting his eyes and he has a limp but overall is doing okay.  Excited to see that he will be able to be at the delivery.  No questions or concerns and states everything is going well.    Plan today:   Did rediscuss induction and going postdates.  Patient would like to go past 41 weeks does not necessarily want to induce right at 41 but would like to induce before 42 weeks because she does understand that risk greatly increase at that time.    My Plan today was to do an NST and BPP either Friday or Saturday since patient is dated by a 15 wk ultrasound and has an unknown LMP as well as a 38 week ultrasound that showed JOSE of 7.    Fetal Non-Stress Test  [facility]  Performed on 3/7/2018 at 40w2d.    Indication: Post dates and as above    FHT:  Baseline: 140 bpm, Variability: minimal at first and then became moderate, Accelerations: Absent and Decelerations: Absent  CONTRACTIONS:  none    Assessment/Plan  Non Reactive NST. Looked like baby was sleeping for the first half. So we let patient off the monitor. We had her walk around and eat. And repeated NST.     Repeat Fetal Non-Stress Test  [facility]  Performed on 3/7/2018 at 40w2d.      FHT:  Baseline: 140 bpm, Variability: Moderate (6 - 25 bpm), Accelerations: Absent and Decelerations: Absent  CONTRACTIONS:  none    Assessment/Plan  Still non reactive but moderate variability throughout.   Discussed with patient overall the strip looks fine but it does not have the accelerations that we're looking for in order to be reassured and needs further monitoring/testing.  Therefore recommend for her to go the hospital for monitoring and a BPP this evening.  We coordinated a Ride for her and her  partner.  I called over to L&D to let them know that she was coming and orders placed.      Alise Lang
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
## 2021-06-16 NOTE — PROGRESS NOTES
[facility] CG and SW consulted on pt on 3/16/18 regarding pt being homeless and struggling connecting with coordinated access to housing and shelter ([facility]). SW was going to call today to 3-way call [employer] to request pre-assessment for [facility], however, SW reviewed chart and pt was in hospital on 3/16/18 and was told to come back today, 3/19/18. SW will not contact as pt is managing health concerns with her upcoming birth of her child. SW asked CG to check-in with the pt at next outreach regarding housing and offer SW visit to assist with connecting to [facility].    If pt is struggling to get onto waiting list with [facility], SW was informed she could try connecting with [employer] in [place], [place] as they may be able to assist.

## 2021-06-16 NOTE — PROGRESS NOTES
3-23-18  Called and spoke to patient and follow up on goals and action steps.  Congratulated patient on baby girl.  Patient reported:  -doing okay. Will follow up with  next week 3-27-18 at 11:20am and would like to see silviano Barlow same day. Informed patient she has appt scheduled for 3-28-18 at 10am.  If she can't make it to call and cancelled 24 hours.  -she talked to hospital social work about housing and she will contact clinic  Saundra Flores. Informed patient that [facility] have  Nurys Lawson that she will help to access housing.   Patient requested to speak to NABIL Greenfield.   Informed patient Nurys will call her later on today.    Consulted and updated Nurys . She will call patient.  Send referral to [facility] for support with insurance for new baby      Plan:  2 weeks follow up 4-6-18

## 2021-06-17 NOTE — PROGRESS NOTES
4-20-18  Met with patient and her baby girl in clinic and follow up on goals and action steps.  Congratulated patient.    Patient reported:  -doing fine. Baby is doing fine and that she is a good eater.  -staying with a friend in [place] temporarily. Stated [employer] are help to find housing.   -have insurance for the baby and herself. Stated insurance effective June 2018.  -has WIC but don't have the ride to [facility] to get WIC. Stated if she can find WIC in [place]. She will ask her friend to see if she can help.  -continue to see therapist Yen Garvey every 2 weeks.  -continue to work with [employer] regarding immigration status-support with filing for asylum.    Plan:  Follow up in clinic 5-21-18 after she sees PCP and therapist.
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
## 2021-06-17 NOTE — PROGRESS NOTES
CG requesting info on DNA testing resources. SW recommends for pt to connect with insurance first to learn more on coverage, if covered what facilities are in network.    Otherwise, [facility] and [facility] can both accommodate pt's testing needs.

## 2021-06-17 NOTE — PROGRESS NOTES
Nadine was in clinic today for [facility] and stopped in to see NABIL. She is doing well, her boyfriend's health is improving, baby is beautiful, and was wearing an outfit picked out at clinic. The  is still working on her asylum case, the political situation in her home country is getting more [place] media attention so that is all in her favor. She was very concerned about her daughters [place] citizenship and that it only may be temporary. NABIL assured her that she was born in the [place] and will always be a [place] citizen unless it is legally changed in a new country of residence. She was relieved to know that. Baby is healthy and growing out of NB clothing already. She did pick out some outfits for 3 months and 6 months to help her get baby comfortably through the summer months. She was needing a baby carrier and SW had a donation of a Snugli from the community so that was given to baby as well today along with 2 packs of size 1 diapers. Nadine is unemployed and has no source of income at the moment, relying on friends and boyfriend for support. NABIL will suggest an appl for MFIP and SNAP now that she has a qualifying citizen in her household. She was requesting some formula for back up, clinic did not have any, SW referred her back to WIC for assistance. She was breastfeeding baby during visit and both she and baby appeared very comfortable and experienced. As always she was very grateful for the assistance the clinic was able to provide for her baby. NABIL faxed a change report form to [facility] to report babies birth. Outstanding medical bills of over $7,000.00 and been re-billed to [facility] for [facility]. 5/1/18: Chart now showing a zero balance.

## 2021-06-17 NOTE — PROGRESS NOTES
4-18-18  Attempt 1: Care Guide called patient. Left voice message to call Aun, Clinic Care Guide at [phone].  Reminded of follow up appt with PCP this Friday 4-20-18 at 10:40am.  If this patient is returning my call, please transfer to [phone].    Meet with patient in clinic 4-20-18 after office visit.

## 2021-06-17 NOTE — PROGRESS NOTES
Per Wade, . Pt came at 11:00 am to appt 11:15 today with a public health nurse. Clinic Care Guide is Aun. Have Aun reserved a room for patient? Pt is not in the schedule. Nurse arrive at 11:15AM.     Reviewed pt chart for today's visit. See no notes. Coordinated with [facility] NABIL. [facility] SW confirmed no.   Coordinated with CA's team for room. Helga confirmed Room 16.     11:30AM:  Met with pt and the [facility] nurse with [employer] named Nguyen. Pt bring her baby in. Assisted them to room 16. Introduced self to both. Congratulated pt for her new baby. Informed them Sonam, care guide for patient is off today. Writer will assisted with need if needed. Checked with pt for today's visit. Nurse is here to see the baby and mom per patient.     [facility] Nurse apologized for not contact [facility] as reminder.  Nguyen confirmed she is not from the [facility] dept.   Room is set up. Assisted room counter changed for nurse. Directed them to clinic scale (weigh station) if need.     Pt's baby got her onesie outfit dirty. Mom and nurse request writer to assisted with a donated onesie from the clinic.   Coordinated with CA's team and ROSHAN [facility] for donated onesie outfit.   Saundra BUENO, confirmed she will give one to the pt's baby.     Saundra and writer approached to the pt's room at the same time. Outfit is given to the pt by Saundra.   Thank NABIL for helped.      Note/FYI:  Message sent to Sonam care guide for pt as FYI.

## 2021-06-17 NOTE — PROGRESS NOTES
6/29/2018: Received a referral today saying issues with patients insurance. Checked mnits. Patient has active insurance.   Search patient by PMI [id number]      MA: Medical Assistance.  Elig Type PX: Pregnant woman  Eligibility Begin Date: 08/01/2017  Eligibility End Date: --/--/----      Check MNITS.      Stated on MNIT      invalid/missing insured name     Patient birth date does not match that for the patient on database     Please correct and resubmit.          Patient speaks English.       06/13/2018: Received JAS from Sonam, faxed to [facility]. Follow up in 2 weeks on status with [facility].    6/6/2018: Sent message to Aun to see if she faxed [facility] JAS.     5/22/2018: Reviewed chart to look for [facility] JAS, none on file in the media section. Still not sure if patient wishes for me to look into this further or not,waiting response on this as well as JAS.     5/14/2018: I haven't received JAS yet at this time to look further into this.    5/1/2018: CG will have patient sign a new [facility] JAS with my name on it so I can look further into this when she comes into the clinic next.       4/30/2018: Received a message back from Sonam. I sent a response back letting her know, I don't know how Asylum status works when it comes to the [facility]. I'm happy to review the [facility] letters had if we could get a county JAS for me to speak with the [facility] further regarding this case, if they want me to. Advised to let me know what she wants me to do.       Aun's message   does not help with SNAP. Patient stated she applied but was denied for MFIP because of her immigration status.  No SW did not help with SNAP or MFIP.    She came to the [place] on student Visa F-1 Visa which is not eligible for any [facility] benefits.  But I was told by Lety if patient is in the process of filing for asylum they might be eligible for benefit and the baby is [place] citizen.  Aun      4/23/2018: Both Her and baby have insurance Stated  insurance effective June 2018 per Aun's note.  I also noticed that SW advised SNAP/MFIP application. Unsure if she assisted patient with this application or not (Combined application). Sent message to Aun. Otherwise, original referral was to add baby to insurance and baby is added. No action needed at this time.           Referral:   Patient had the baby 3-19-18.     Would like help what she needs to do to make sure baby and she has insurance.

## 2021-06-17 NOTE — PROGRESS NOTES
"Nadine Kilgore is a 31 y.o. female who is being evaluated via a billable video visit.      How would you like to obtain your AVS? MyChart.  If dropped from the video visit, the video invitation should be resent by: Text to cell phone: [phone]  Will anyone else be joining your video visit? No      Video Start Time: 8:41 AM    Assessment & Plan     Nadine was seen today for mouth lesions.    Diagnoses and all orders for this visit:    Herpes labialis: Discussed with patient my suspicion based on her exam and history is that she has herpes.  Reviewed etiology of this and generally recurrent.  Discussed that if we want a definitive diagnosis I would need her to come in when she starts to get the breakout so that I can do a test to confirm.  Reviewed that it comes out in times of stress.  She reports a burning sensation prior to the lesions coming out and then they sound vesicular.  Since it started 6 days ago now appear to be getting better and \"drying out \", I do not see a point in starting antiviral at this time.  Expect her to get better in the next couple days.  Did discuss that if she has another outbreak she should start taking the acyclovir as soon as she starts to feel the prodromal burning symptoms.  For now she asked for something for the sore she still has - I  will do Orajel for that.  Follow-up as needed or if continues to recur.  -     benzocaine (ORAJEL) 10 % mucosal gel; Apply to the mouth or throat 3 (three) times a day as needed for pain.  -     acyclovir (ZOVIRAX) 400 MG tablet; Take 1 tablet (400 mg total) by mouth 3 (three) times a day for 5 days. With next outbreak           No follow-ups on file.    Alise Lang, [facility]    Subjective   Nadine Kilgore is 31 y.o. and presents today for the following health issues   HPI   Sores on upper lips   Was all over.   Now can't really see it  Came out really little like irritation  Then it came out like " blisters  Outer and inner lips and tongue in my mouth  It has happened before  After I had COVID I had it too  Started on Saturday. Getting dryer   It hurts  Covid in December    Recommendation letter. For school. email address  Apply for PA school- automatic link. When submit my email address  Text my link    Review of Systems  No fevers, chills, n/V  + HA and general fatigue- but has been having that for awhile.  Denies cough, shortness of breath, congestion, sore throat      Objective       Vitals:  No vitals were obtained today due to virtual visit.    Physical Exam  Of course difficult to see on video but she is a well-appearing female in no acute distress.  She has some what appear to be crusted sores on her lips- upper and lower.  I am unable to see the lesions on her inner lips  No signs of respiratory distress, no cough noted  Alert and oriented x3, speaking full sentences      Video-Visit Details    Type of service:  Video Visit    Video End Time (time video stopped): 9:01 AM  Originating Location (pt. Location): Home    Distant Location (provider location):  [facility]     Platform used for Video Visit: E96

## 2021-06-17 NOTE — PROGRESS NOTES
Post Partum Check:    Delivery at 42w1d now .  Date of delivery: 3/20/18    Patient concerns: little bump/rough spot in vagina. No discharge.  patient is very concerned about her vagina post delivery. States when she inserts a finger that she can feel a rough spot in the middle and down -perineum? On her left she states there is some tissue that she can grab. And on the left labial area she thinks something is wrong too.     Bleeding: no concerns.  Spotting stopped. Thought she had a few spots the other days. For two days then got better.     Pain: no concerns    LMP:Patient's last menstrual period was 2017 (lmp unknown).     Depression: no concerns  EPDS Score: 16    Contraception Plan:  None, no partners    Immunizations needed:  none    Pap smear:  Had 10/17: normal.     Assessment:  Well post partum check up.  EPDS score of 16.  History of PTSD as well as adjustment disorder with anxiety and Depression.    Plan:  Continue seeing Yen for therapy.  Consider starting on medication.  Patient's been declining.  Close follow-up.    Pelvic exam performed: speculum exam performed and bimanual. Cervix is closed. No TTP, uterus is normal size and ovaries are normal. Was not able to find patient's concerns. Came back to do a digital exam with patient pointing out where the worrisome spots are. One is just proximal to the vaginal introitus, the next is where her laceration was on the left and the last on the left labia. Everything appears normal/ healing well.     Discussed at length with patient that her vaginal/vulvar/perineal area likely will not be back to 100% like it was pervious to delivery. That is very unlikely for most woman who have had a baby. Tried to redirect her expectations. Reassurred her that everything is healing well/looks normal for this stage. It is only 6 weeks and the laceration area could do a little bit more healing. But otherwise this may just be her new normal.     Alise  Rickey

## 2021-06-17 NOTE — PROGRESS NOTES
[facility] EXAM note      Chief Complaint   Patient presents with     Follow-up     getting better         Assessment & Plan    Problem List Items Addressed This Visit     Carpal tunnel syndrome - Primary: Bilateral.  Postpartum.  Right side has resolved.  Left side is getting better.  No residual weakness noted on the right side.  Discussed I think it would be okay if we will continue to monitor to make sure the left side is getting better.  She should follow-up in 2 weeks if it is not back to normal.  If it is worsening she is to let me know sooner and I would recommend that she does get the steroid injection by Ortho.  She states understanding.  Provided reassurance regarding a distal steroid injection and breast-feeding.  We also rediscussed the diagnosis of carpal tunnel.            History    Nadine Kilgore is a 28 y.o.  female who presents for the following issues:    Follow-up carpal tunnel:  Patient states when she originally made this appointment her symptoms had not improved she wanted my opinion going forward.  Since then she states that her right hand is basically back to normal.  No weakness or numbness tingling currently.  However the left one still has a little bit of weakness but is getting better.  She reports her joints do feel painful.  She did see Ortho who had recommended bilateral steroid injections but she declined at that time and hopes that the symptoms would get better on their own.  And now she is wondering what she should do next.  She is nervous to get the steroid injections.  Some of her holdup is because she is concerned because she is exclusively breast-feeding and steroids can be found in breastmilk - per patient.  She also still does not have a clear understanding of what carpal tunnel is.      mEDICATIONS    Current Outpatient Prescriptions on File Prior to Visit   Medication Sig Dispense Refill     diphenhydrAMINE (BENADRYL) 25 mg capsule Take 1 capsule (25 mg  "total) by mouth at bedtime as needed for sleep. 30 capsule 2     docusate sodium (COLACE) 100 MG capsule Take 1 capsule (100 mg total) by mouth daily. 30 capsule 0     ibuprofen (ADVIL,MOTRIN) 800 MG tablet Take 1 tablet (800 mg total) by mouth every 8 (eight) hours. 30 tablet 0     prenatal vit-iron fum-folic ac (PRENATAL VITAMIN) 27 mg iron- 0.8 mg Tab Take 1 tablet by mouth once daily. 100 tablet 3     No current facility-administered medications on file prior to visit.        Pertinent past medical, surgical, social and family history reviewed and updated in [facility].    Social History     Social History     Marital status: Single     Spouse name: N/A     Number of children: N/A     Years of education: N/A     Occupational History     Not on file.     Social History Main Topics     Smoking status: Never Smoker     Smokeless tobacco: Never Used     Alcohol use No     Drug use: No     Sexual activity: Yes     Partners: Male     Other Topics Concern     Not on file     Social History Narrative         Review of systems     Pertinent Positives and negatives in HPI.     Physical Exam    /60  Pulse 72  Resp 20  Ht 5' 5\" (1.651 m)  Wt 173 lb (78.5 kg)  LMP 06/01/2017 (LMP Unknown)  SpO2 98%  BMI 28.79 kg/m2  GEN:  28 y.o. female sitting comfortably in no apparent distress.   HEENT: EOMI, no scleral icterus, buccal mucosa moist  CHEST/LUNG: No respiratory distress  MSK: Hands/wrists: No swelling, discoloration noted.  Normal range of motion.  Slight weakness noted in her left hand.  Reports no numbness or tingling.    Follow up: In 2 weeks if no improvement.    Alise Lang"

## 2021-06-17 NOTE — PROGRESS NOTES
[facility] clinic EXAM note      Chief Complaint   Patient presents with     hand issue     hand swelling       Assessment & Plan    Problem List Items Addressed This Visit     None      Visit Diagnoses     Vaginitis    -  Primary: Suspect from unraveled suture irritating the vaginal mucosa.  Left vaginal sutures were removed with sterile scissors and pickups.  The right labial sutures were left in place.  We will also obtain cultures today per below.  Per exam it looks like she has a yeast infection will treat empirically for that at this time.  Also discussed increasing ibuprofen to 2-3 times daily to help with the pain and swelling.  She is to let me know if she has any fevers, increase in uterine bleeding or increased pain.    Relevant Orders    Chlamydia trachomatis & Neisseria gonorrhoeae, Amplified Detection (Completed)    Wet Prep, Vaginal (Completed)    Culture, Genital (Completed)    Carpal tunnel syndrome    : Exam and history consistent with postpartum carpal tunnel syndrome.  Likely from increased swelling in her hands.  No other signs of preeclampsia and blood pressure is normal today.  Her hands do not necessarily look more swollen on exam.  We will start with carpal tunnel braces today given to her in clinic, increasing ibuprofen to 2-3 times daily and icing.  She is to call me by Friday to let me know if there is any improvement in her symptoms.  If not will refer to Ortho.    Relevant Orders    Ambulatory referral to Orthopedics        Update: Patient called me on  saying that there was no improvement in her numbness tingling and weakness in her hands.  Therefore order placed for urgent orthopedics referral.  May need steroid injection or possible carpal tunnel release.      History    Nadine Kilgore is a 28 y.o.  female who presents for the following issues:    Patient is one-week status post delivery at 42 weeks gestation.  She presents with her  today and has her own  questions and concerns.    Vaginitis: She states that the area where her stitches are on the left is very irritated.  They're bothering her a lot.  She has some slightly yellowish discolored vaginal discharge.  No pain in her cervix or uterus.  No abdominal pain.  Her bleeding has greatly slowed down to spotting.  She is wondering if this is normal/okay.  Sutures on her right labia are not bothering her.  No fevers today.  Had a headache a few days ago but thinks is from lack of sleep.  She otherwise denies any vision changes, right upper quadrant pain, swelling in her lower extremities, shortness of breath.    Numbness and weakness in her hands: Bilateral.  Started a few days ago.  Difficult to  things.  Difficult for her to change her baby's diaper and feed a bottle.  She is only taken one ibuprofen tablet a day.  She has not tried anything to help such as icing.  She denies pain but just the numbness.  She has never had anything like this before.      mEDICATIONS    Current Outpatient Prescriptions on File Prior to Visit   Medication Sig Dispense Refill     diphenhydrAMINE (BENADRYL) 25 mg capsule Take 1 capsule (25 mg total) by mouth at bedtime as needed for sleep. 30 capsule 2     docusate sodium (COLACE) 100 MG capsule Take 1 capsule (100 mg total) by mouth daily. 30 capsule 0     ibuprofen (ADVIL,MOTRIN) 800 MG tablet Take 1 tablet (800 mg total) by mouth every 8 (eight) hours. 30 tablet 0     prenatal vit-iron fum-folic ac (PRENATAL VITAMIN) 27 mg iron- 0.8 mg Tab Take 1 tablet by mouth once daily. 100 tablet 3     No current facility-administered medications on file prior to visit.        Pertinent past medical, surgical, social and family history reviewed and updated in [facility].    Social History     Social History     Marital status: Single     Spouse name: N/A     Number of children: N/A     Years of education: N/A     Occupational History     Not on file.     Social History Main Topics     Smoking  "status: Never Smoker     Smokeless tobacco: Never Used     Alcohol use No     Drug use: No     Sexual activity: Yes     Partners: Male     Other Topics Concern     Not on file     Social History Narrative         Review of systems     Pertinent Positives and negatives in HPI.     Physical Exam    /60  Pulse 80  Resp 20  Ht 5' 5\" (1.651 m)  Wt 179 lb (81.2 kg)  LMP 06/01/2017 (LMP Unknown)  SpO2 97%  BMI 29.79 kg/m2  GEN:  28 y.o. female sitting comfortably in no apparent distress.   HEENT: EOMI, no scleral icterus, buccal mucosa moist  CHEST/LUNG: No respiratory distress, good air flow to bases, CTAB   CV: RRR, S1, S2 normal; no murmurs, rubs or gallops. No edema.   ABD:  Soft, non-tender, non distended, no guarding,  No masses  MSK: Positive Tinel's test bilaterally.  Poor  strength.  Other strength normal in the upper extremity.  She reports decreased sensation in her hands starting at the wrist bilaterally.  SKIN: warm, dry, no rashes or lesions  NEURO: Gait normal, coordination intact  PSYCH:  Mood and affect appropriate  : Patient declines speculum exam.  I was able to locate the area of irritation on the left vaginal sutures.  Sutures were found to be unraveling and not really holding anything together anymore.  About an inch and a half was sticking out.  This area was found to be slightly erythematous but no purulent drainage noted.  Also noted some whitish curd-like discharge around the vaginal opening and couple inches into the vagina.      Follow up:she is to call me by Friday to notify me if she is having any improvement with both concerns.    Alise Lang    "

## 2021-06-18 NOTE — PROGRESS NOTES
Wade Bateman, registrar met with Care Guide to consult to clarify transportation for next Friday and appt for 8am next Friday.  Stated she already set up ride for 6-29-18 appt with Yen and transportation will  at 9am for 10am appt.  Stated patient thought she also has appt with Care Guide with HARISH Cedillo RN at 8am too.  Wade not sure what to do and stated to pickup at the [place] address not at [place].   Stated she wants to be picked up at this address [address], [place], [place] [address].and that [place] address is only for mails.        6-21-18 Telephone  Called and spoke to patient to clarify appt and transportation.  Reviewed upcoming appts.  Patient has an appt with therapist next Friday 6-29-18 at 10 am.   Informed her that transportation will  at 9 am for 10am appt.  Stated she wanted transportation to  early next Friday to meet with Nguyen and Care Elisabeth at clinic at 8:00am.  Explained that Care Guide was not aware of the appt at 8 am next Friday.  Patient stated she will call to discuss with Nguyen.      Called and spoke to HARISH Cortez RN regarding appt for next Friday at 8 am.  Stated she has appt with patient at 8:30am next Friday.  Explained to her that Care Guide was not aware of the appt and it was not communicate to Care Guide to schedule for next Friday.  Asked what should she do -cancel the appt.   Informed that transportation already set up for 9am  for 10am appt next Friday.   Suggested Nguyen to discuss with patient and call Care Guide to update plan.  Stated she will patient tomorrow 6-21-18.

## 2021-06-18 NOTE — PROGRESS NOTES
5-25-18  Called and spoke to patient and follow up on goals.  Patient reported:  -still staying at a friend in [place].  -still have WIC but due to lack of transportation she was not able to go to  the voucher.  -would like to talk to  about the paternity test. Stated it was for herself and later that she will ask the county. Explained that she might have to pay out of pocket for the test. Will discuss with .   Sent message to Nurys Lawson, [facility] for support about the paternity test.  -continue to get support from [employer] to help find housing and to file for asylum.    Patient stated  from [employer] to request medical records and psychological evaluation and notes.  Stated she signed JAS with Yen already.   Roxanna Marinelli (sp?),  at [place]   Explained to patient HIPAA and that JAS needs to fill out correctly and fax to  Medical Records to get those records.  Suggested  to call Care Guide and get the  Medical Record fax # and contact information ( Medical Record Fax# [phone] Office [phone]).  Will provide the information when she meets with  next Tuesday 5-29-18.     Stated she will be meeting with the [facility] RN (Familia Salas, RN PHN) next Thursday 5-31-18 after she sees Yen at 9 am. appt at 10:15am.    Coordinated to meet with Public Health RN 5-31-18 at 10:15am     Plan:   monthly follow up 6-25-18

## 2021-06-18 NOTE — PROGRESS NOTES
Mental Health Visit Note    2018  Start time: 9:25am   Stop Time: 10:00am   Session #8    Nadine Kilgore is a 28 y.o. female is being seen today for    Chief Complaint   Patient presents with     MH Follow Up     Patient presented for follow up psychotherapy appointment to address symptoms of PTSD and depression   .     New symptoms or complaints: working on Asylum status.     Functional Impairment:   Personal: 4  Family: 4  Work: 4  Social:4    Clinical assessment of mental status:   Grooming: Well groomed  Attire: Appropriate  Age: Appears Stated  Behavior Towards Examiner: Cooperative  Motor Activity: Within normal   Eye Contact: Appropriate  Mood: Euthymic  Affect: Congruent w/content of speech, Constricted and Anxious  Speech/Language: Within normal  Attention: Hypervigilant  Concentration: Brief  Thought Process: Within normal  Thought Content: Hallucinations: Within noraml  Delusions: Within normal  Orientation: X 3  Memory: No Evidence of Impairment  Judgement: No Evidence of Impairment  Estimated Intelligence: Average  Demonstrated Insight: Adequate  Fund of Knowledge: adequate       Suicidal/Homicidal Ideation present: None Reported This Session    Patient's impression of their current status:  Patient reports she is moving to [place] soon. They have found a place and the [employer] she is connected to has people who will help financially. She plans to move in with her baby. She continues to worry about her boyfriend's health. He is not doing well. She has not talked to him about moving in together. She is in the process of applying for political asylum. She would like provider to talk to her  through [employer]. Called today while in person- patient and [employer]  are requesting a written letter from provider.     Therapist impression of patients current state: Patient presented late for follow up psychotherapy appointment with her  baby girl- her medical  norm came late. She and baby were well groomed and presented with a good attachment. Patient has been guarded in her responses lately, but it appears to be an avoidance of difficult feelings as discussed in previous sessions. It is evident that her daughter brings her much happiness and a purpose/meaning. She is a caring mother. She presents with future-oriented thinking and hopefulness. Her trauma symptoms continues to impact her daily life.Therapist had patient sign JAS for [employer] worker- specifically for a letter regarding asylum status.     Type of psychotherapeutic technique provided: Insight oriented, Client centered, Solution-focused and CBT    Progress toward short term goals:Progress as expected, continues to be physically active. Improvement in mood. Continues to have trauma symptoms. Working on asylum status, which will be empowering for patient and a way to share her trauma story and human rights violation. This may help bring some healing to her past traumas.    Review of long term goals: Treatment Plan effective 05/11/2018-08/11/2018    Diagnosis:   1. PTSD (post-traumatic stress disorder)    2. Single current episode of major depressive disorder, unspecified depression episode severity       Plan and Follow up: Patient to follow up for psychotherapy on 6/14/18 per patient request.       Discharge Criteria/Planning: Patient will continue with follow-up until therapy can be discontinued without return of signs and symptoms.    WILL Sims 5/31/2018

## 2021-06-18 NOTE — PROGRESS NOTES
Mental Health Visit Note    6/14/2018  Start time: 12:00pm   Stop Time: 12:55pm   Session #9    Nadine Kilgore is a 28 y.o. female is being seen today for    Chief Complaint   Patient presents with     MH Follow Up     Patient presented for follow up psychotherapy appointment to address symptoms of PTSD and depression.    .     New symptoms or complaints: Sad- boyfriend is going to [facility].    Functional Impairment:   Personal: 4  Family: 4  Work: 4  Social: 3    Clinical assessment of mental status:   Grooming: Well groomed  Attire: Appropriate  Age: Appears Stated  Behavior Towards Examiner: Cooperative  Motor Activity: Within normal   Eye Contact: Appropriate  Mood: Sad  Affect: Congruent w/content of speech, Anxious and Depressed  Speech/Language: Within normal  Attention: Hypervigilant  Concentration: Brief  Thought Process: Within normal  Thought Content: Hallucinations: Within noraml  Delusions: Within normal  Orientation: X 3  Memory: No Evidence of Impairment  Judgement: No Evidence of Impairment  Estimated Intelligence: Average  Demonstrated Insight: Adequate  Fund of Knowledge: adequate       Suicidal/Homicidal Ideation present: None Reported This Session    Patient's impression of their current status:  Patient reports she is sad because her boyfriend is going to [facility] for herbal/traditional treatment.  The plan is for him to go for 2 months. She is worried about his health. She tries to avoid thinking about it and talking about it. She doesn't like feeling sad. She and the baby continue to plan moving to [place] soon. She continues to receive support from [employer]. They have also been visiting her and helping with some transportation and finances. Her social support network has grown. She is awaiting her hearing for asylum. She doesn't want to have to talk about the most difficult parts of her trauma story, but knows it is important and something she will likely need to do. She  expresses a desire to lose weight and has been trying to walk more.    Therapist impression of patients current state: Patient presented for follow up psychotherapy appointment with her baby girl. Patient and baby are well groomed and appear to have a good attachment. She smiles when she looks at her baby. She talks to her and sings to her. It is clear that her daughter brings her much happiness in the midst of other difficult things she has experienced. She has had a lot of grief, loss and sadness with the trauma, resettlement, and her boyfriend's health. Her daughter has brought her much ashlie and hope for the future and is such a positive part of her life. Patient met with her care guide and public health nurse prior to our session today. She has a tendency to avoid things- thinking about them, talking about them, feeling them. Acknowledged it is ok to feel things, such as sadness. We explored her tendency to engage in avoidance and developed insight into the impact of it. She continues to have frequent thoughts although they have decreased in severity and frequency. Her sleeping has improved, which indicates some decrease in hypervigilance and hyperarousal. She would benefit from continued practice of deep breathing and mindfulness-based/meditation skills to help tune into her body and the present moment rather than getting stuck in her head with her thoughts. Discussed how to acknowledge thoughts and be aware, but then redirect self to tune into breath and body- encouraged practice.     Type of psychotherapeutic technique provided: Insight oriented, Client centered and CBT, mindfulness    Progress toward short term goals:Progress as expected, improved sleep. Experiencing moments of ashlie and happiness. Maintaining appointments. Utilizing supports- working on finalizing upcoming move.    Review of long term goals: Not done at today's visit and Treatment Plan effective 05/11/2018-08/11/2018    Diagnosis:   1. PTSD  (post-traumatic stress disorder)    2. Single current episode of major depressive disorder, unspecified depression episode severity       Plan and Follow up: Patient to follow up for psychotherapy on 6/29/18, 7/23/18.       Discharge Criteria/Planning: Patient will continue with follow-up until therapy can be discontinued without return of signs and symptoms.    Yen Garvey, WILL 6/14/2018

## 2021-06-18 NOTE — PROGRESS NOTES
Psychotherapist completed letter for patient on 6/6/2018 as part of her application for Aslyum status. Patient signed a release on 5/31/18 for letter to be written by provider and sent to her  at [employer]. A copy of the letter was sent to be scanned into patient's medical record. Yen SOLIS

## 2021-06-18 NOTE — PROGRESS NOTES
6-14-18  Met patient, patient's baby, and with Familia Salas RN PHN in the clinic and follow up on goals.  Follow up on housing.  Patient reported:  -still living in [place]. Will be moving to [place] next week. Will call Care Guide of new address.  -boyfriend (Sidney) will be going to [facility] July 9 for 2 months.    Informed patient Saundra Padilla, Clinic  working on getting size 3 diapers for the baby.    Plan:   follow up 7-3-18  Will coordinate with NATHALY Cedillo to meet patient in clinic

## 2021-06-18 NOTE — PROGRESS NOTES
Mental Health Visit Note    5/11/2018  Start time: 1:10pm   Stop Time: 2:00pm   Session #7    Nadine Kilgore is a 28 y.o. female is being seen today for    Chief Complaint   Patient presents with     MH Follow Up     Patient arrived for follow up psychotherapy appointment to address symptoms of PTSD and depression   .     New symptoms or complaints: working on Asylum status.     Functional Impairment:   Personal: 4  Family: 4  Work: 4  Social:4    Clinical assessment of mental status:   Grooming: Well groomed  Attire: Appropriate  Age: Appears Stated  Behavior Towards Examiner: Cooperative  Motor Activity: Within normal   Eye Contact: Appropriate  Mood: Euthymic  Affect: Congruent w/content of speech, Flat and Anxious  Speech/Language: Within normal  Attention: Hypervigilant  Concentration: Brief  Thought Process: Within normal  Thought Content: Hallucinations: Within noraml  Delusions: Within normal  Orientation: X 3  Memory: No Evidence of Impairment  Judgement: No Evidence of Impairment  Estimated Intelligence: Average  Demonstrated Insight: Adequate  Fund of Knowledge: adequate       Suicidal/Homicidal Ideation present: None Reported This Session    Patient's impression of their current status:  Patient reports she has been sleeping better and going on walks in the evening. She denies any frequent thought flow. She notes that some times she feels good and other times she feels bad. She expresses hopefulness for the future and a desire to complete school and graduate one day. She is interested in applying for more benefits for her baby and would like help from the clinic care coordination program with this. She is working with the  at [employer] on finding housing. They are touring a place tomorrow and stated they would help her pay for housing. She continues to endorse some stress in her relationship and plans to discuss this further with PCP.    Therapist impression of  patients current state: Patient presented for follow up psychotherapy appointment with her  baby girl. Patient and baby were also in to see PCP today. Prior to meeting with therapist, she met with her public health nurse. Patient was well groomed and smiling upon greeting. Overall improvement in mood and symptoms today. Presents with future oriented thinking and hopefulness. She continues to be able to enjoy activities and experience moments of pleasure and happiness, such as attending a graduation with friends yesterday. Therapist had patient sign JAS for [employer] worker and provider as patient requested they seem to want to communicate about her applying for asylum status. They have not attempted to contact therapist at this time.     Type of psychotherapeutic technique provided: Insight oriented, Client centered, Solution-focused and CBT    Progress toward short term goals:Progress as expected, improved sleeping. increased physical activity by going walking with baby in the evenings. feeling more hopeful.     Review of long term goals: Treatment Plan updated and Treatment Plan effective 2018-2018    Diagnosis:   1. PTSD (post-traumatic stress disorder)       Plan and Follow up: Patient to follow up for psychotherapy on 18 per patient request.       Discharge Criteria/Planning: Patient will continue with follow-up until therapy can be discontinued without return of signs and symptoms.    WILL Sims 2018

## 2021-06-18 NOTE — PROGRESS NOTES
5-31-18  Met with patient and baby girl (Ely) in clinic and follow up on goals and action steps.  Patient reported:  -will be moving to [facility] in 2 weeks.  -postponed apply for food support (SNAP) until she moves to new place.  -continue to work with  from [employer].    Coordinated with Dana to meet patient in clinic to help with paternity test.   Patient met with Dana and she requested to help with paternity test.  Nguyen stated she will help with getting the paternity test.  Will follow up with Nguyen 6-14-18 at 10:15 am and therapist on 6-14-18 at 12pm    Patient signed JAS to communicate with [facility].     Follow up in clinic 6-14-18 at 10:15am
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
## 2021-06-18 NOTE — PROGRESS NOTES
Programs applying for: Insurance  Case:  Financial worker:   [facility] is ending.    07/03/2018: Per Sammy's note, insurance is active. Nothing for G to do at this time.  06/25/2018: Called [facility] to see if they received the JAS I sent on June 13th. We don't have Case # or SS for mother. Check notes from Aun on what we need to do at this time (FRG)

## 2021-06-19 NOTE — LETTER
Letter by Yen Garvey LICSW at      Author: Yen Garvey LICSW Service: -- Author Type: --    Filed:  Encounter Date: 3/27/2019 Status: (Other)         March 27, 2019     Patient: Nadine Kilgore   YOB: 1990   Date of Visit: 3/27/2019       To Whom it May Concern:    Nadine Kilgore was seen in my clinic on 3/27/2019. She may return to school on 3/29/2019.    She is experiencing worsening anxiety with the amount of classes she is taking. She is feeling overwhelmed, especially in regards to managing it as a single mother. Therefore, we have discussed it may be best for her to drop a class in order to decrease her anxiety. It is my medical opinion that dropping a course would help make her anxiety symptoms more manageable, which in turn would allow her to focus and perform better in school. She recently experienced day care problems, which was an additional stressor and also impacts her ability to balance parenting responsibilities and her education. She is a highly driven individual and is passionate about learning and pursuing her degree. It is important she do this in a way that allows her to be most successful.    If you have any questions or concerns, please don't hesitate to call.    Sincerely,           Electronically signed by WILL Sims

## 2021-06-19 NOTE — PROGRESS NOTES
"  Mental Health Visit Note    08/06/2018  Start time: 12:12pm  Stop Time: 12:50pm   Session #11    Nadine Kilgore is a 28 y.o. female is being seen today for    Chief Complaint   Patient presents with     MH Follow Up     Patient presented for follow up psychotherapy to address symptoms of PTSD, depression and anxiety.   .     New symptoms or complaints: sad. Recently moved to new place    Functional Impairment:   Personal: 4  Family: 4  Work: 4  Social: 3    Clinical assessment of mental status:   Grooming: Well groomed  Attire: Appropriate  Age: Appears Stated  Behavior Towards Examiner: Cooperative and Guarded/Evasive  Motor Activity: Within normal   Eye Contact: Appropriate  Mood: Depressed  Affect: Congruent w/content of speech, Anxious and Depressed  Speech/Language: Delayed/Hesitant and Soft  Attention: Hypervigilant  Concentration: Brief  Thought Process: Within normal  Thought Content: Hallucinations: Within noraml  Delusions: Within normal  Orientation: X 3  Memory: No Evidence of Impairment  Judgement: No Evidence of Impairment  Estimated Intelligence: Average  Demonstrated Insight: Adequate  Fund of Knowledge: adequate       Suicidal/Homicidal Ideation present: None Reported This Session- denies any SI or HI. No active plans or means.     Patient's impression of their current status:  Patient endorses feeling \"sad\"- \"not every day.\" She moved into her new home yesterday. She is adjusting and getting settled. She is living with a family from [place]. She spoke to her boyfriend 2 days ago. She has a goal to be independent, drive, attend school. She currently feels like she is relying on a lot of help. She continues to receive a lot of support from [employer].    Therapist impression of patients current state: Patient presented for follow up individual psychotherapy appointment with her baby girl. She is soft spoken today. She presents with anxious mood and is somewhat avoidant today. She " "continues to adjust to resettlement and relies on support of others. She presents with goal and future-oriented talk. She continues to decline medications to manage symptoms. She maintains follow ups with PCP. She has a goal to increase activity- explored seeing if a gym membership is covered by her insurance. More work on managing anxiety and depression in therapy and outside of therapy through use of other coping skills would be beneficial.    Type of psychotherapeutic technique provided: Insight oriented, Client centered, CBT and motivational interviewing.    Progress toward short term goals:Progress as expected, decrease in \"what ifs\"/worries. Report sit is \"a bit better\". Moved to new place.     Review of long term goals: Not done at today's visit and Treatment Plan effective 05/11/2018-08/11/2018    Diagnosis:   1. PTSD (post-traumatic stress disorder)    2. Moderate single current episode of major depressive disorder (H)       Plan and Follow up: Patient is scheduled for follow up psychotherapy on 8/20/2018.       Discharge Criteria/Planning: Patient will continue with follow-up until therapy can be discontinued without return of signs and symptoms.    WILL Sims 08/06/2018  "

## 2021-06-19 NOTE — PROGRESS NOTES
Programs applying for :SNAP/CASH  Financial worker:  Case #:    10/12/2018: FRG and CG consulted, nothing for MU to do at this time, taking pt off panel.    2018: Called [facility] and spoke with Avis. Avis indicated that patient seeking Asylum needs to have legal documentation of when Asylum date is set before applying for County benefits. I called patient and she will call [facility] after meeting with  on  as the Asylum is not set yet.     2018: Spoke with Pa at [facility]. She confirmed that all Asylum cases are same for [facility]. I need to speak with patient and find the followin.Who is she working with seeking asylum and 2. where is she seeking asylum? Once I get these answers I will need to call [facility] to see if she would be eligible for SNAP assistance. I called patient and she informed me she had her interview for asylum on  and has a meeting with her  on . She has been in the [place] for 15 months, previously living in [place], [place]. I will touch base with [facility] next week and then set up appointment with patient. Patient also stated she is receiving collection letter from [facility]. I encouraged her to contact [facility] directly to discuss.     Referral:  Patient has a stable place in [place] now.       Would like to see if she would be eligible to apply for SNAP or any county benefits can check again if she would eligible for MFIP.      Baby is 5 months old.     Patient already in the process of seeking asylum with [employer]. She does have a  that is helping her.          She is English speaking.     Thanks Nurys.

## 2021-06-19 NOTE — PROGRESS NOTES
7-23-18  Met with patient and baby in clinic with [facility] RN.  Follow up on goals.  Patient reported:  -will be moving to [place] in 2 weeks new address [address], [place] [address].  -has a stroller now from [place].  -continue to work with [employer]    Nguyen will check with [facility] if she is eligible to apply for any [facility] benefit due to her immigration status.     Next appt with NATHALY Cedillo 8-6-18 at 1pm after she sees Yen.    Patient will call Care Guide if she will cancel the 1pm appt.    Plan:   follow up in clinic 8-6-18 at 1pm

## 2021-06-19 NOTE — PROGRESS NOTES
Mental Health Visit Note    6/29/2018  Start time: 9:45am  Stop Time: 10:40am   Session #10    Nadine Kilgore is a 28 y.o. female is being seen today for    Chief Complaint   Patient presents with     MH Follow Up     Patient presented for follow up psychotherapy visit to address symptoms of PTSD and depression.   .     New symptoms or complaints: grief. worry. Concerned about health of boyfriend.     Functional Impairment:   Personal: 4  Family: 4  Work: 4  Social: 3    Clinical assessment of mental status:   Grooming: Well groomed  Attire: Appropriate  Age: Appears Stated  Behavior Towards Examiner: Cooperative  Motor Activity: Within normal   Eye Contact: Appropriate  Mood: Depressed  Affect: Congruent w/content of speech, Anxious and Depressed  Speech/Language: Within normal  Attention: Hypervigilant  Concentration: Brief  Thought Process: Within normal  Thought Content: Hallucinations: Within noraml  Delusions: Within normal  Orientation: X 3  Memory: No Evidence of Impairment  Judgement: No Evidence of Impairment  Estimated Intelligence: Average  Demonstrated Insight: Adequate  Fund of Knowledge: adequate       Suicidal/Homicidal Ideation present: None Reported This Session- denies any SI/HI. She identifies her baby as a protective factor. She also feels she needs to be here to care for her boyfriend.    Patient's impression of their current status:  Patient reports she is sad and things are weighing on her heart. She is worried about her boyfriend's health. She doesn't really want to talk about it- it is too difficult. He has been hospitalized for the last two weeks and she has been staying with him. His health is failing and she reports the doctors are surprised he is still alive. They have talked about hospice care for him. She has not moved yet because she has been spending time at the hospital and caring for her boyfriend. She was informed her asylum hearing will be July 12th. Her  will  be out of town, but patient wants to continue. She doesn't want to reschedule. She is worried if it gets postponed, her boyfriend's health will be worse and she will not feel strong enough to speak her story. She feels she needs him there for support and strength. She wonders if it gets moved to a later date, he will not be well or alive and it would be too difficult for her. She feels confident in her ability at this time to present her case. She noted another person from the [employer] would be present with her.     Therapist impression of patients current state: Patient presented for follow up psychotherapy appointment with her baby girl. She presents with sad and depressed mood. She also exhibits some anxiety and worry. Reviewed the grieving process and normalized feelings. Developed more insight into her tendency to avoid. She noted she almost cancelled our appointment today because she didn't want to talk about these things as they are difficult to talk about. She is good at stating her needs, such as when she is ready to move on to a different topic. She continues to work hard to stay strong and be resilient. She doesn't like to feel sadness as she seems to see it as a sign of weakness. Explored how feelings can change - she has insight into this. Normalized feeling a variety of feelings, especially when grieving about her boyfriend's failing health yet also trying to be hopeful and enjoy the present as he is still living. Updated PHQ-9 measure- score is 15, indicating moderate symptoms. Denies any SI. Updated PCP re: PHQ-9 score for coordination of care. Prior to next visit, encouraged patient to engage in self-care and enjoyable activities. We also discussed the importance of mindfulness based practices.    Type of psychotherapeutic technique provided: Insight oriented, Client centered and CBT, psychoeducation on grief process    Progress toward short term goals:Progress as expected,  grieving yet maintaining resiliency. Increased symptoms of depression. Recommended more self-care.    Review of long term goals: Not done at today's visit and Treatment Plan effective 05/11/2018-08/11/2018    Diagnosis:   1. PTSD (post-traumatic stress disorder)    2. Single current episode of major depressive disorder, unspecified depression episode severity       Plan and Follow up: Patient to follow up for psychotherapy on 7/23/18. Encouraged patient to schedule another appointment in earlier July and then some in August before schedule fills up.       Discharge Criteria/Planning: Patient will continue with follow-up until therapy can be discontinued without return of signs and symptoms.    WILL Sims 6/29/2018

## 2021-06-19 NOTE — LETTER
Letter by Yen Garvey LICSW at      Author: Yen Garvey LICSW Service: -- Author Type: --    Filed:  Encounter Date: 11/21/2019 Status: Signed         November 21, 2019     Patient: Nadine Kilgore   YOB: 1990   Date of Visit: 11/21/2019       To Whom it May Concern:    Nadine Kilgore was seen in my clinic on 11/21/2019.     She is experiencing worsening anxiety with the amount of classes she is taking. She is feeling overwhelmed, especially in regards to managing it as a single mother. Therefore, we have discussed it may be best for her to drop a class in order to decrease her anxiety. She has also had worsening depression symptoms including depressed mood, fatigue, insomnia, and difficulty concentrating. It is my medical opinion that dropping a course would help make her anxiety and depression symptoms more manageable, which in turn would allow her to focus and perform better in school. She is a highly driven individual and is passionate about learning and pursuing her degree. It is important she do this in a way that allows her to be most successful.      If you have any questions or concerns, please don't hesitate to call.    Sincerely,         Electronically signed by WILL Sims

## 2021-06-19 NOTE — PROGRESS NOTES
8-6-18  Patient moved to new place.  New address: [address]  [address] [place] [address]    Updated chart.

## 2021-06-19 NOTE — LETTER
Letter by Sonam Bush CHW at      Author: Sonam Bush CHW Service: -- Author Type: --    Filed:  Encounter Date: 7/29/2019 Status: (Other)         Dear Nadine Kilgore,                                                                              On 11- you enrolled with the [facility] Care Coordination Services.  In order for the Clinic Care  Coordination team to provide guidance in completing the goals and actions steps you have established, you and I agreed we would be in contact every month.                                  We last spoke on 6-17-19 in order to follow up on goals and action steps.  Since then, I have tried calling you 2 times in the last two weeks and have been unsuccessful in reaching you.               Please call me at [phone] at your earliest convenience.  If you reach my voicemail, please leave a message with your daytime telephone number and a date and time that I can return your call.                                                                            Sincerely,                                                                           CRYSTAL White                                                                     Clinic Care Coordination                                          [facility]                                                Phone: [phone]

## 2021-06-19 NOTE — PROGRESS NOTES
8-6-18  Met with patient in clinic and follow up on goals and action steps.  Patient reported:  -moved into a house on [address], [place] [address]. Goal completed.  -will update county of new address.    Educated patient of of resources and organizations and shop around her neighborhood.  Informed her that [facility] [address] close to home if she wants to switch clinic closer to home.  If she switches clinic, then she will have different Care Guide and PCP for herself and baby.  If she needs legal help,  there is [facility] on [address]  Patient will wait to think about it.    Coordinated for patient to meet with [facility] RN to continue to provide support and services for baby and patient.  Dana will meet patient at her home for follow up appt.    Plan:  Follow up 9-6-18  Discuss transition to maintenance

## 2021-06-19 NOTE — LETTER
Letter by Yen Garvey LICSW at      Author: Yen Garvey LICSW Service: -- Author Type: --    Filed:  Encounter Date: 11/21/2019 Status: Signed         Nadine Kilgore  [address]  [place] [address]           November 21, 2019     Patient: Nadine Kilgore   YOB: 1990   Date of Visit: 11/21/2019       To Whom it May Concern:    Nadine Kilgore was seen in my clinic on 11/21/2019.     She is experiencing worsening anxiety with the amount of classes she is taking. She is feeling overwhelmed, especially in regards to managing her physical and mental health conditions. Therefore, we have discussed it may be best for her to drop a class. She was hospitalized 10/04/2019-10/06/2019. She has also had worsening depression symptoms including depressed mood, fatigue, insomnia, and difficulty concentrating. It is my medical opinion that dropping a course would help make her anxiety and depression symptoms more manageable, which in turn would allow her to focus and perform better in school. She is a highly driven individual and is passionate about learning and pursuing her degree. It is important she do this in a way that allows her to be most successful.      If you have any questions or concerns, please don't hesitate to call.    Sincerely,           Electronically signed by WILL Sims
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
## 2021-06-19 NOTE — PROGRESS NOTES
Mental Health Visit Note    08/20/2018  Start time: 12:05pm  Stop Time: 12:50pm   Session #12    Nadine Kilgore is a 28 y.o. female is being seen today for    Chief Complaint   Patient presents with     MH Follow Up     Patient presented for follow up psychotherapy to address symptoms of PTSD and depression   .     New symptoms or complaints: None    Functional Impairment:   Personal: 4  Family: 4  Work: 4  Social: 3    Clinical assessment of mental status:   Grooming: Well groomed  Attire: Appropriate  Age: Appears Stated  Behavior Towards Examiner: Cooperative  Motor Activity: Within normal   Eye Contact: Appropriate  Mood: Euthymic  Affect: Congruent w/content of speech  Speech/Language: Within normal  Attention: Hypervigilant  Concentration: Brief  Thought Process: Within normal  Thought Content: Hallucinations: Within noraml  Delusions: Within normal  Orientation: X 3  Memory: No Evidence of Impairment  Judgement: No Evidence of Impairment  Estimated Intelligence: Average  Demonstrated Insight: Adequate  Fund of Knowledge: adequate       Suicidal/Homicidal Ideation present: None Reported This Session- denies any SI or HI. No active plans or means.     Patient's impression of their current status:  Patient came into session expressing how the father of her child is a miracle. He recently had significant health complications where they thought he was dying. His health suddenly turned around. Patient has been contacted by him. She has felt community support in lifting him up in prayer. She feels very supported by the family she is living with. She is starting to feel more comfortable there. They want to include her in things and help her if needing rides, etc. She is happy where she is living. The woman in the house has helped with patient's baby so patient has some time for herself.      Therapist impression of patients current state: Patient presented for follow up individual psychotherapy appointment  with her baby girl.  Patient was well-groomed, alert and oriented. She was excited at the beginning of session and eager to share the news about her significant other's health. He is connected with her family back home. Patient still has not heard back about her asylum case. Her move with this family has been beneficial for her and has increased her social supports. She also relies on her gabriel for strength and a way to cope. She has been making significant improvements in her mental health. Encouraged her to continue to find time for self-care.     Type of psychotherapeutic technique provided: Insight oriented, Client centered, CBT and motivational interviewing.    Progress toward short term goals:Progress as expected, improvement in symptoms. improvement in sleep. enjoying new living environment. Increased social support and socialization.    Review of long term goals: Treatment Plan updated and Effective 08/20/2018-11/20/2018.    Diagnosis:   1. Moderate single current episode of major depressive disorder (H)    2. PTSD (post-traumatic stress disorder)       Plan and Follow up: Patient is scheduled for follow up psychotherapy on 9/11/2018.       Discharge Criteria/Planning: Patient will continue with follow-up until therapy can be discontinued without return of signs and symptoms.    WILL Sims 08/20/2018

## 2021-06-20 NOTE — LETTER
Letter by Kyle Maier PharmD at      Author: Kyle Maier PharmD Service: -- Author Type: --    Filed:  Encounter Date: 8/4/2020 Status: (Other)           Nadine Kilgore  [address]  [place] [address]                 August 4, 2020       To Whom It May Concern:       Nadine Kilgore has been successfully treated for Latent Tuberculosis Infection, per Centers for Disease Control and Prevention and [place] Department of Health guidelines. Treatment was started on 12/26/2019 and completed on 03/12/2020. Per guidelines, patient has had the appropriate follow-up cares and at this time does not require further follow-up for the Latent TB infection.        Please call with questions at [phone]       Sincerely,    Kyle Maier PharmD  Medication Therapy Management (MTM) Pharmacist  [facility]        Electronically signed by Kyle Maier PharmD  Under authorization of primary care provider: Alise Lang DO

## 2021-06-20 NOTE — PROGRESS NOTES
Programs applying for :SNAP/CASH  Financial worker:  Case #:          10/10/2018: Sent message to CG as it looks like patient was approved for SNAP and goal is completed.  Will wait to determine if further FRG support is needed.

## 2021-06-20 NOTE — PROGRESS NOTES
9-13-18  Spoke to patient and follow up on goals and action steps.  Patient reported:  -boyfriend (Sidney) passed away in [place] of cancer. Expressed condolence to patient.  -baby is sick. Has diarrhea for 3 days and was wondering if she should take her to [facility]  Stated she is also not feeling too if she should go to [facility] for stomach ache.  Offered patient to consult with triage nurse regarding symptoms. Patient declined to talk to triage nurse.  Educated patient she can to go [facility] in [place]. Stated she knows where it is.   Stated she preferred to go to [facility].  Met and consulted with Dr. Rivero regarding baby having diarrhea for 3 days.  Per Dr. Rivero recommended if diarrhea is bad take [facility] versus [facility].  If stool is loose schedule appt to see Dr. Lang tomorrow.  Relay the message to patient.  Stated what about her where should she go for her stomach ache  Recommended patient to consult with triage nurse to assess where best to go for care.  Patient declined and that she wants to go to [facility] ER.    Patient will call [employer] to set up same day ride.    Patient cancelled appt with therapist 9-11-18 and reminded patient to reschedule with Yen when ready for support to cope with boyfriend death.    Plan  Follow up 9-27-18 to check on patient due to death of boyfriend

## 2021-06-20 NOTE — PROGRESS NOTES
10-2-18  Called and spoke to patient and follow up on goal.  Patient reported:  -she has food stamp. [facility] approved it. Stated she went down to [facility] to fill out some paperwork and staff ask if she has EBT card. Patient explained that she is not eligibled to apply for food stamp.   Staff stated she was qualify to apply for food stamp. Staff helped to apply and was approved. Goal completed.   -still have not received baby certificate. Stated why it is taking so long. She already spoke to Nguyen [facility] PH RN and she instructed to contact child support unit.    Stated she called and left voice message to call back. Still waiting for someone to call back.    Discussed with patient any other goals or support.  Explained to patient if no other goals then transition to maintenance and then graduate from [employer].  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  Set new goal. Information and resources for CNA classes.    Reminded to reschedule to see Yen Garevy, therapist.  Patient will call clinic to reschedule.  Educated and explained to patient of new change and policies to setting up Medical Ride through [employer].    Patient stated she understands.      Plan:  Notify  FR of food stamp approved by [facility]  -print out resource for CNA classes   [employer] [facility]  [address] [place] [address] [phone]  -follow up 11-7-18

## 2021-06-20 NOTE — LETTER
Letter by Alise Lang DO at      Author: Alise Lang DO Service: -- Author Type: --    Filed:  Encounter Date: 1/22/2020 Status: (Other)         January 24, 2020     Patient: Nadine Kilgore   YOB: 1990   Date of Visit: 1/22/2020       To Whom It May Concern:    It is my medical opinion that Nadine Kilgore was hospitalized with a febrile illness at [facility] from 10/4/-10/6/19. She was discharge but then needed another week to recover. Please excuse her for this absence.     If you have any questions or concerns, please don't hesitate to call.    Sincerely,        Electronically signed by Alise Lang DO

## 2021-06-20 NOTE — PROGRESS NOTES
Patient cancelled her appointment with this provider on 9/11/2018. Provider was notified that patient's boyfriend passed away recently. Provider attempted to call patient to follow up on how she is doing, express condolences and see about scheduling a future psychotherapy appointment. Left VM as patient did not answer. Encouraged patient to call clinic to get scheduled. Yen SOLIS

## 2021-06-21 NOTE — PROGRESS NOTES
11-13-18  Attempt 1: Care Guide called patient. Left voice message to call Sonam, Donaldo Care Guide at [phone].  If this patient is returning my call, please transfer to [phone].  Upcoming appt 11-14-18 at 11am with WILL Sims    Follow up in clinic 11-14-18

## 2021-06-21 NOTE — PROGRESS NOTES
11-14-18  Met with patient and her 7 month old baby girl in clinic and follow up on goals.  Joint visit with WILL Sims.    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  - would like to apply for  so she can go to school. Will go to the [facility] to apply for MFIP/  -she knows where to go for help. She has information to [employer] [place].  Stated she knows where to go for help.  Okay to complete goal and that she does not have any other goals at this time.    Discussed transition to maintenance with patient.  Patient okay to transition to maintenance   Informed Care Guide will follow up in 3 months and if no other goals or need then graduate from Clinic Care Coordination.  Patient stated she understands and know to call Care Guide if she needs support.    Reviewed current support:  -has food support from the [facility]  -has [employer] MA  -has medical transportation through [employer]  -has support from  at [employer] regarding immigration status  -has [facility] RN (Familia Salas RN PHN) for support  -staying with a family   -has therapy services ( WILL Sims) at [facility]    Met and consulted with PCP okay to transition to maintenance.      Plan:  Follow up in 3 months  ER update   Complete Wellness Plan by 11-28-18

## 2021-06-21 NOTE — PROGRESS NOTES
"  Mental Health Visit Note    2018  Start time: 11:05am  Stop Time: 12:05pm   Session #13    Nadine Kilgore is a 28 y.o. female is being seen today for    Chief Complaint   Patient presents with      Follow Up     Patient presented for follow up psychotherapy to address symptoms of depression, grief and PTSD   .     New symptoms or complaints: bereavement    Functional Impairment:   Personal: 4  Family: 4  Work: 4  Social: 4    Clinical assessment of mental status:   Grooming: Well groomed  Attire: Appropriate  Age: Appears Stated  Behavior Towards Examiner: Cooperative  Motor Activity: Within normal   Eye Contact: Avoidant at times  Mood: Depressed  Affect: Congruent w/content of speech and Depressed  Speech/Language: Within normal  Attention: Hypervigilant  Concentration: Brief  Thought Process: Within normal  Thought Content: Hallucinations: Within noraml  Delusions: Within normal  Orientation: X 3  Memory: No Evidence of Impairment  Judgement: No Evidence of Impairment  Estimated Intelligence: Average  Demonstrated Insight: Adequate  Fund of Knowledge: adequate       Suicidal/Homicidal Ideation present: None Reported This Session- denies any SI or HI. No active plans or means.     Patient's impression of their current status:  Patient endorses depressed mood, sadness, grief, isolating behaviors, loss of appetite, unplanned weight loss, fatigues, negative thoughts. She feels that something is always wrong. Her boyfriend  from cancer. She is now worried that the father in the family she lives with has a mass and it might be cancerous. She sees him as a father figure. \"Cancer is following me.\" She reports her father back home is sick. She does enjoy her daughter who brings her much happiness and ashlie. She is able to tune to her daughter's needs.    Therapist impression of patients current state: Patient presented for follow up individual psychotherapy appointment with her baby girl. She hasn't been to " therapy since August as she has been isolating. She confirmed she received the VM from therapist. Her depression symptoms have increased due to grief/loss of her partner who passed away from cancer. She endorses symptoms of moderately severe depression. She denies any SI/HI and reports she is safe. She has a lot of good support at her current home- the family has taken her in- she is a part of the family. She is well cared for and supported. She hasn't been walking, which was something she was enjoying. Encouraged patient to start walking again and doing other forms of self care.    Type of psychotherapeutic technique provided: Insight oriented, Client centered, CBT and motivational interviewing.    Progress toward short term goals:Poor progress, bereavement is exacerbating depression. Isolating. Hasn't been to therapy since August.      Review of long term goals: Not done at today's visit and Effective 08/20/2018-11/20/2018. Will update at next session.    Diagnosis:   1. PTSD (post-traumatic stress disorder)    2. Moderate single current episode of major depressive disorder (H)    3. Bereavement, uncomplicated       Plan and Follow up: Patient is scheduled for follow up psychotherapy on 12/06/2018.       Discharge Criteria/Planning: Patient will continue with follow-up until therapy can be discontinued without return of signs and symptoms.    WILL Sims 11/14/2018

## 2021-06-21 NOTE — PROGRESS NOTES
Patient is moving to a Wellness Plan. Please review emergency plan and update if needed.   Understanding Post-Traumatic Stress Disorder (PTSD)  You may have post-traumatic stress disorder (PTSD) if you ve been through a traumatic event and are having trouble dealing with it. Such events may include a car crash, rape, domestic violence,  combat, or violent crime. While it is normal to have some anxiety after such an event, it usually goes away in time. But with PTSD, the anxiety is more intense and keeps coming back. And the trauma is relived through nightmares, intrusive memories, and flashbacks (vivid memories that seem real). The symptoms of PTSD can cause problems with relationships and make it hard to cope with daily life. But it can be treated. With help, you can feel better.  How does it feel?  Symptoms of PTSD often start within a few months of the event. Here are some common symptoms:  You startle more easily, and feel anxious and on edge all the time. This can lead to sleep problems. It a can cause you to feel overwhelmed or become angry or upset more easily. Panic attacks (sudden, intense feelings of terror and a strong need to escape from wherever you are) can also occur.  You relive the event through nightmares and flashbacks. During these, you may feel strong emotions and as though you re reliving the event all over again.  You avoid people, places, or activities that remind you of the trauma. You may hold in your feelings and become emotionally numb. It may be hard to concentrate at work or school or to relax with friends. You may be afraid to let people get close to you.  Who does it affect?  Not everyone who survives a trauma will have PTSD. But many will. In fact, millions of people have the condition. PTSD can happen to anyone, but it most often develops after a person feels his or her or another s life is threatened.  You re at risk for PTSD if you have experienced or witnessed:  A rape or  sexual abuse  A mugging or carjacking  A car accident or plane crash  A life-threatening illness  War  Domestic violence  Childhood abuse  Natural disasters such as earthquakes, floods, or hurricanes  The sudden death of a loved one  Finding help  The first step is to talk to a trusted counselor or healthcare provider. He or she can help you take the next step to treatment. This may involve therapy (also called counseling) and medication.  Are you having suicidal thoughts?  You may be feeling helpless, hopeless, and that you can t go on. You may even have thoughts of suicide. But help is available. There are ways to ease this pain and manage the problems in your life.  If you are thinking about harming or killing yourself, please tell your healthcare provider or someone you care about immediately or go to the nearest crisis walk-in center or emergency room.  You can also call, toll-free,  800-SUICIDE (728-504-5886)   [phone] ([phone])     Resources  American Psychiatric Association  909.371.7208  www.healthyminds.org  American Psychological Association  www.apa.org/helpcenter  Anxiety and Depression Association of [place]  www.adaa.org  Mental Health [place]  www.nmha.org  National Center for PTSD  www.ptsd.va.gov  National White of Mental Health  www.nimh.nih.gov/topics/dfgbw-cntp-mdhy.shtml  National Suicide Prevention Lifeline  775.494.8569 (643-476-SEFU)  Www.suicidepreventionlifeline.org     Depression  Everyone feels down at times. The blues are a natural part of life. But an unhappy period that s intense or lasts for more than a couple of weeks can be a sign of depression. Depression is a serious illness. It can disrupt the lives of family and friends. If you know someone you think may be depressed, find out what you can do to help.    Know the serious signals  Warning signals for suicide include:    Threats or talk of suicide    Statements such as  I won t be a problem much longer  or  Nothing  matters     Giving away possessions or making a will or  arrangements    Buying a gun or other weapon    Sudden, unexplained cheerfulness or calm after a period of depression  If you notice any of these signs, get help right away. Call a health care professional, mental health clinic, or suicide hotline and ask what action to take. In an emergency, don t hesitate to call the police.    [facility] Mental Health Crisis Lines:  [facility] [phone]  [facility] [phone]  [facility] [phone]  [facility] [phone]  [facility], Adults [phone]  [facility], Children [phone]  [facility], Adults [phone]  [facility], Children [phone]    Emergency Plan Recommendation:    When to Use the Emergency Department (ED)  An emergency means you could die if you don t get care quickly. Or you could be hurt permanently (disabled). Read below to know when to use -- and when not to use -- an emergency department (also called ED).    Dangers to your life  Here are examples of emergencies. These need immediate care:  A hard time breathing  Severe chest pain  Choking  Severe bleeding  Suddenly not able to move or speak  Blacking out (fainting)`  Poisoning    Dangers of permanent injuries  Here are other emergencies. These also need immediate care:  Deep cuts or severe burns  Broken bones, or sudden severe pain and swelling in a joint    When it s an emergency  If you have an emergency, follow these steps:    1. Go to the nearest emergency department  If you can, go to the hospital ED closest to you right away.  If you cannot get there right away, or if it is not safe to take yourself, call 911 or your police emergency number.  2. Call your primary care doctor  Tell your doctor about the emergency. Call within 24 hours of going to the ED.  If you cannot call, have someone call for you.  Go to your doctor (not the ED) for any follow-up care.    When it s not an  emergency  If a problem is not an emergency, follow these steps:    1. Call your primary care doctor  If you don t know the name of your doctor, call your health plan.  If you cannot call, have someone call for you.  2. Follow instructions  Your doctor will tell you what you should do.  You may be told to see your doctor right away. You may be told to go to the ED. Or you may be told to go to an urgent care center.  Follow your doctor s advice.

## 2021-06-22 NOTE — PROGRESS NOTES
Mental Health Visit Note    12/19/2018  Start time: 09:13am  Stop Time: 10:00am   Session #15    Nadine Kilgore is a 28 y.o. female is being seen today for    Chief Complaint   Patient presents with     MH Follow Up     Patient presented for follow up psychotherapy to address feeling of grief, sadness, inability to sleep and adjusting to moving.   .     New symptoms or complaints: moved.     Functional Impairment:   Personal: 4  Family: 4  Work: 4  Social: 4    Clinical assessment of mental status: Reviewed. MSE is current effective 12/19/2018  Grooming: Well groomed  Attire: Appropriate  Age: Appears Stated  Behavior Towards Examiner: Cooperative  Motor Activity: Within normal   Eye Contact: Appropriate   Mood: Depressed  Affect: Congruent w/content of speech and Depressed, anxious  Speech/Language: Within normal  Attention: Hypervigilant  Concentration: Brief  Thought Process: Within normal  Thought Content: Hallucinations: Within noraml  Delusions: Within normal  Orientation: X 3  Memory: No Evidence of Impairment  Judgement: No Evidence of Impairment  Estimated Intelligence: Average  Demonstrated Insight: Adequate  Fund of Knowledge: adequate       Suicidal/Homicidal Ideation present: None Reported This Session. No SI/HI.     Patient's impression of their current status:  Patient reports recently moving in with a new family she knows from [place]. It is a couple with 3 young children. She expressed how she is tired of moving as she has moved 4 times in the last year. She is in the process of getting settled. She still plans to connect with the other family as they are close and she cares deeply about them. The man she was living with has cancer and she is very sad about that. She wanted to give the family space in the home and with managing his health needs. She is not sleeping well with the changes. She states her sleep has been bad for about 2 months. She has been walking a lot. She is enrolled in school,  but needs to take a placement test. She expresses feeling nervous about school. She found a  for her daughter to attend.     Therapist impression of patients current state: Patient presented for follow up individual psychotherapy appointment with her daughter. Patient was well-groomed, alert and oriented. She continues to be open and engaged in session. She uses session as a time to process her thoughts and feelings, which she has a tendency to hold in. She is not very open about her feelings with others. We continue to explore that in session as well as assess her willingness to be more open and expressive in her communication, especially about feelings. This is an exciting time for her to focus on her own goals in life such as schooling. Having  will allow her more time for herself and for school. She is feeling hopeful and optimistic about her future.      Type of psychotherapeutic technique provided: Insight oriented, Client centered, CBT and motivational interviewing.    Progress toward short term goals:Progress as expected, enrolled in school and is taking steps to get started at school. She has also connected wiht more supports, such as [facility] resources.     Review of long term goals: Effective 12/06/2018-03/06/2019. Reviewed, signed, and sent to scan into EMR     Diagnosis:   1. PTSD (post-traumatic stress disorder)    2. Moderate single current episode of major depressive disorder (H)       Plan and Follow up: Patient is scheduled for follow up psychotherapy on 1/03/2019.       Discharge Criteria/Planning: Patient will continue with follow-up until therapy can be discontinued without return of signs and symptoms.    Yen Garvey, WILL 12/06/2018

## 2021-06-22 NOTE — PROGRESS NOTES
12-21-18  Patient requested to see Care Guide.  Met with patient and baby daughter.  Joint visit with CCC NABIL Padilla.  Patient reported:  -that she did not get the birth certificate yet.  - went to [place] but she has pay $26 for the birth certificate.  -she can't add the father (Sidney)name because he passed away and can't proof that he is the father.  -Was not able to do the paternity test.  -She will discuss with Sidney's doctor if he can help.  -moved to [address] on [address] [place] [address]. Updated in chart  -not sure why she has $15 co pay and that she is not able to pay.   Suggested for her to talk to financial worker about the copay.  -Nguyen, PH RN continues to her.    NABIL suggested baby's father (Sidney) family member to send strain of hair to do testing.  She will talk to Sidney's family member.    Will connect with MU Nuno to clarify about the Co Pay again.    Plan:   follow up 1-24-19

## 2021-06-22 NOTE — PROGRESS NOTES
12-5-18  Attempt 1: Care Guide called patient. Left voice message to call Aun, Clinic Care Guide at [phone]. Reminded of tomorrow appt 1-2-6-18 at 9am with Yen. [facility] will be out of office tomorrow 12-6-18 and back on 12-12-18.  If this patient is returning my call, please transfer to [phone].  Upcoming appt with Yen 12-6-18 at 9am.     Follow up in clinic 12-19-18
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
## 2021-06-22 NOTE — PROGRESS NOTES
"  Mental Health Visit Note    12/06/2018  Start time: 09:16am  Stop Time: 10:00am   Session #14    Nadine Kilgore is a 28 y.o. female is being seen today for    Chief Complaint   Patient presents with     MH Follow Up     Patient presented for follow up psychotherapy to ki   .     New symptoms or complaints: None    Functional Impairment:   Personal: 4  Family: 4  Work: 4  Social: 4    Clinical assessment of mental status:   Grooming: Well groomed  Attire: Appropriate  Age: Appears Stated  Behavior Towards Examiner: Cooperative  Motor Activity: Within normal   Eye Contact: Appropriate   Mood: Depressed  Affect: Congruent w/content of speech and Depressed, anxious  Speech/Language: Within normal  Attention: Hypervigilant  Concentration: Brief  Thought Process: Within normal  Thought Content: Hallucinations: Within noraml  Delusions: Within normal  Orientation: X 3  Memory: No Evidence of Impairment  Judgement: No Evidence of Impairment  Estimated Intelligence: Average  Demonstrated Insight: Adequate  Fund of Knowledge: adequate       Suicidal/Homicidal Ideation present: None Reported This Session. Denies SI/ HI.   Completed updated C-SSRS in session. No ideation. No self-harm. No preparatory behaviors. No past attempts. Low risk for suicide.   She states, \"Who would take care of my daughter?\"    Patient's impression of their current status:  The father-figure in the family she is living with has cancer. She is grieving and very concerned about his health. It reminder her of her baby's father who went through cancer treatments and passed away. It is hard for her to watch another person she loves go through this. She is considering moving out although they have been good supports and like family to her. She feels they may need space and she also struggles to see his health decline. She is in the final process of getting her work permit. She plans to start attending [employer] in January. She wants to go to " college, get a job, and have her daughter receive childcare. She has a goal to become a pharmacist.    Therapist impression of patients current state: Patient presented for follow up individual psychotherapy appointment with her baby girl. She is experiencing compound grief. She was very open in session and processed her thoughts and feelings as she doesn't feel like she can do that at home. She feels the need to be strong for them and also established boundaries where she wants to give them space as a family. She cares so much about the family but feels she can't ask too many questions, such as life expectancy, although she would like to know the answers because she is grieving too. She presents with moderate severity of depression. She has difficulty falling asleep. Unfortunately her sleep is also impacted by her 8 month old daughter who is waking up 3-4 times/night. She has poor appetite and is unable to concentrating. She endorses depressed mood and anhedonia with daily fatigue.  Patient and therapist updated treatment plan goals together. Patient completed updated PHQ-9 measure.   PHQ-9 = 14     Type of psychotherapeutic technique provided: Insight oriented, Client centered, CBT and motivational interviewing.    Progress toward short term goals:Progress as expected, going through the grieving process which is exacerbating depression. One improvement is getting her work permit and starting to sign up for college classes.      Review of long term goals: Treatment Plan updated and Effective 12/06/2018-03/06/2019     WHODAS 2.0 12-item version: Total = 9 or 18.75%  Scores presented in qualifiers to represent level of disability.  MILD Problem (slight, low, ...) 5-24%  H1= 30  H2= 0  H3= 2       Diagnosis:   1. PTSD (post-traumatic stress disorder)    2. Moderate single current episode of major depressive disorder (H)       Plan and Follow up: Patient is scheduled for follow up psychotherapy on 12/19/2018.        Discharge Criteria/Planning: Patient will continue with follow-up until therapy can be discontinued without return of signs and symptoms.    WILL Sims 12/06/2018

## 2021-06-23 NOTE — TELEPHONE ENCOUNTER
----- Message from Griselda Vasquez sent at 1/21/2019  8:00 AM CST -----  Regarding: RE: Question about Co Pay  The patient will need to call [facility] at [phone] and report that she has no income. We don't do anything with co-pays but I am thinking since she [place], then that's why she has a co-pay. Thank you.     â  This subscriber is eligible for FF: [place] [employer].   Elig Type M2: [place] group II   Eligibility Begin Date: 10/01/2018   Eligibility End Date: --/--/----   This subscriber is eligible for the following service types: Medical Care ,  Chiropractic ,  Dental Care ,  Hospital ,  Hospital - Inpatient ,  Hospital - Outpatient ,  Emergency Services ,  Pharmacy ,  Professional (Physician) Visit - Office ,  Vision (Optometry) ,  Mental Health ,  Urgent Care        Prepaid Health Plan   â  This subscriber receives FF01 - [facility]  delivered through [employer] The phone numbers are: [phone] (metro) or [phone] (toll free).     Other Eligibility Information   â  No Special Transportation.  â  No Hospice.  â  Refer to  Health Care Programs and Services Overview of the [facility] Provider Manual for a list of covered services.     Waivers   None     Subscriber Responsibility Information   â  An office visit copay of 25 dollars may exist for this subscriber.  â  A copay of 75 dollars for Emergency Room may exist for this subscriber.  â  A copay of 40 dollars for Radiology may exist for this subscriber.  â  A copay of 250 dollars for Hospital Inpatient may exist for this subscriber.  â  A copay of 100 dollars for Outpatient or ASC surgery may exist for this subscriber.  â  A copay of 25 dollars for Eyeglasses may exist for this subscriber.  â  A copay of 15 dollars for Non-Routine Dental may exist for this subscriber.   â  A coinsurance of 10% for Durable Medical Equipment may exist for this subscriber.     Restricted Recipient Program   None       Medicare   None         ----- Message  -----  From: Sonam Bush  Sent: 1/18/2019   2:28 PM  To: Clinic Financial Resource Guide Pool  Subject: Question about Co Pay                            Patient wondering why she has co pay because she does not have a job.  Requesting help to clarify.  I was not able to get through in [facility] to check.

## 2021-06-23 NOTE — PROGRESS NOTES
Attempt 2: Care Guide called patient. Phone number not in service.  If this patient is returning my call, please transfer to Aun at ext [phone]    .Next outreach: 2/11/19

## 2021-06-23 NOTE — PROGRESS NOTES
2-11-19  Called and spoke to patient and follow up if she call to [place] [facility] to notify that she is not working has no income.  Patient reported:  -she started school in January. Going to [employer] taking general classes.  -county said they won't pay for the . She brings the baby to  by the school.   -[employer] is for the baby. Suggested to talk to [employer] work of any other  options.  -she will call to [place] [facility] at [phone] to discuss copay and notify she has no income, going to school not working.    Patient took down [employer] number. She will call to talk to them.    Plan:  Follow up 3-19-19

## 2021-06-23 NOTE — PROGRESS NOTES
1-24-19  Attempt: #1  Care Guide called patient. Left voice message for patient to call [facility] at [phone] and report that she has no income to address the co-pays.   If she has questions to call Aun, Clinic Care Guide at [phone].  If this patient is returning my call, please transfer to [phone].  Upcoming appt in the clinic 1-31-19 at 9am with Yen Garvey.    Plan: follow up with patient in clinic 1-31-19 with Yen.

## 2021-06-24 NOTE — PROGRESS NOTES
Mental Health Visit Note    3/6/2019    Start time: 2:10pm    Stop Time: 2:52pm   Session # 1    Session Type: Patient is presenting for an Individual session.    Nadine Kilgore is a 28 y.o. female is being seen today for    Chief Complaint   Patient presents with     MH Follow Up     Patient presenetd for follow up psychotherapy to address coping wiht stress as a single mother.    .     New symptoms or complaints: school stress    Functional Impairment:   Personal: 3  Family: 4  Work: 4  Social:3    Clinical assessment of mental status:   Grooming: Well groomed  Attire: Appropriate  Age: Appears Stated  Behavior Towards Examiner: Cooperative  Motor Activity: Within normal   Eye Contact: Appropriate  Mood: Anxious  Affect: Congruent w/content of speech and Anxious  Speech/Language: Within normal  Attention: Within normal  Concentration: Brief  Thought Process: Circumstantial  Thought Content: Hallucinations: Within noraml  Delusions: Within normal  Orientation: X 3  Memory: No Evidence of Impairment  Judgement: No Evidence of Impairment  Estimated Intelligence: Average  Demonstrated Insight: Adequate  Fund of Knowledge: adequate       Suicidal/Homicidal Ideation present: None Reported This Session. Denies SI/ HI.   Completed C-SSRS in session. No ideation. No self-harm. No preparatory behaviors. No past attempts. Low risk for suicide.       Patient's impression of their current status: Patient reports she started college full time. She feels the need to drop one class as she is so overwhelmed. She is tired. She stays up in the night to read and do homework. She is busy caring for her daughter. She reports stress finding a good  for her and needing to pull her form one day care. She expresses a goal to work and save money for a car.     Therapist impression of patients current state: Patient presented for follow up individual psychotherapy. She has not been to therapy for a few months. Therefore, patient  and therapist updated/reviewed treatment plan goals and measures. Patient presents with anxious mood. She is managing a lot as a single mother and going to school. Reviewed sleep hygiene and the importance of self-care and sleep for her overall health. She is taking on a lot and is starting to achieve some of her goals, but it has been stressful and a lot for her to manage. Developed insight into her strengths and what she has accomplished.     Type of psychotherapeutic technique provided: Insight oriented, Client centered and Solution-focused, strengths based.    Progress toward short term goals:Poor progress, in regards to self-care and maintaining therapy appointments. Good progress in regards to pursing some of her personal goals of school.     Review of long term goals: Treatment Plan updated. Effective 3/6/19- 6/6/19. Reviewed and signed last treatment plan. We are continuing the same gaols on the updated treatment plan since patient hadn't returned since the development of the last treatment plan was effective through today.     Diagnosis:   1. PTSD (post-traumatic stress disorder)    2. Moderate single current episode of major depressive disorder (H)        Plan and Follow up: Patient is scheduled for follow up psychotherapy on 3/27/19      Discharge Criteria/Planning: Client has chronic symptoms and ongoing therapy for maintenance stability recommended.    Yen Garvey [facility] 3/6/2019
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
## 2021-06-25 NOTE — PROGRESS NOTES
3-21-19  Called and spoke to patient and follow up on goal.  Patient reported:  -she called to [facility] and informed them that she is not working and has no income. She was told that she if she does not work that she has to pay the $15 copay and that rule has changed.  Stated she does not have any insurance and that she fell down scare to go to the hospital.  She is at the [facility] right now to talk to a financial worker about it.    Patient will call to update.    Plan: follow up 4-23-19
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
## 2021-07-03 NOTE — ADDENDUM NOTE
Addendum Note by Larisa Sy MD at 12/17/2020  3:00 PM     Author: Larisa Sy MD Service: -- Author Type: Physician    Filed: 12/20/2020  9:25 PM Encounter Date: 12/17/2020 Status: Signed    : Larisa Sy MD (Physician)    Addended by: LARISA SY on: 12/20/2020 09:25 PM        Modules accepted: Orders

## 2021-07-06 ASSESSMENT — PATIENT HEALTH QUESTIONNAIRE - PHQ9: SUM OF ALL RESPONSES TO PHQ QUESTIONS 1-9: 0

## 2021-07-14 PROBLEM — Z34.90 PREGNANCY: Status: RESOLVED | Noted: 2018-03-19 | Resolved: 2018-03-21

## 2021-07-14 PROBLEM — Z34.90 PREGNANT: Status: RESOLVED | Noted: 2018-03-20 | Resolved: 2018-03-21

## 2021-07-14 PROBLEM — Z34.00 NORMAL PREGNANCY, FIRST: Status: RESOLVED | Noted: 2017-09-12 | Resolved: 2019-09-17

## 2021-07-14 PROBLEM — F43.23 ADJUSTMENT DISORDER WITH MIXED ANXIETY AND DEPRESSED MOOD: Status: RESOLVED | Noted: 2017-11-09 | Resolved: 2019-09-17

## 2021-08-15 ENCOUNTER — HEALTH MAINTENANCE LETTER (OUTPATIENT)
Age: 31
End: 2021-08-15

## 2021-09-13 ENCOUNTER — OFFICE VISIT (OUTPATIENT)
Dept: FAMILY MEDICINE | Facility: CLINIC | Age: 31
End: 2021-09-13
Payer: MEDICAID

## 2021-09-13 ENCOUNTER — NURSE TRIAGE (OUTPATIENT)
Dept: NURSING | Facility: CLINIC | Age: 31
End: 2021-09-13

## 2021-09-13 VITALS
RESPIRATION RATE: 16 BRPM | DIASTOLIC BLOOD PRESSURE: 62 MMHG | BODY MASS INDEX: 25.53 KG/M2 | HEART RATE: 84 BPM | TEMPERATURE: 97.8 F | SYSTOLIC BLOOD PRESSURE: 90 MMHG | OXYGEN SATURATION: 99 % | WEIGHT: 153.44 LBS

## 2021-09-13 DIAGNOSIS — R53.83 FATIGUE, UNSPECIFIED TYPE: Primary | ICD-10-CM

## 2021-09-13 LAB
BASOPHILS # BLD AUTO: 0 10E3/UL (ref 0–0.2)
BASOPHILS NFR BLD AUTO: 0 %
EOSINOPHIL # BLD AUTO: 0.1 10E3/UL (ref 0–0.7)
EOSINOPHIL NFR BLD AUTO: 1 %
ERYTHROCYTE [DISTWIDTH] IN BLOOD BY AUTOMATED COUNT: 11.7 % (ref 10–15)
HCT VFR BLD AUTO: 39.3 % (ref 35–47)
HGB BLD-MCNC: 13.3 G/DL (ref 11.7–15.7)
IMM GRANULOCYTES # BLD: 0 10E3/UL
IMM GRANULOCYTES NFR BLD: 0 %
LYMPHOCYTES # BLD AUTO: 1.6 10E3/UL (ref 0.8–5.3)
LYMPHOCYTES NFR BLD AUTO: 30 %
MCH RBC QN AUTO: 29.5 PG (ref 26.5–33)
MCHC RBC AUTO-ENTMCNC: 33.8 G/DL (ref 31.5–36.5)
MCV RBC AUTO: 87 FL (ref 78–100)
MONOCYTES # BLD AUTO: 0.3 10E3/UL (ref 0–1.3)
MONOCYTES NFR BLD AUTO: 5 %
NEUTROPHILS # BLD AUTO: 3.4 10E3/UL (ref 1.6–8.3)
NEUTROPHILS NFR BLD AUTO: 64 %
PLATELET # BLD AUTO: 316 10E3/UL (ref 150–450)
RBC # BLD AUTO: 4.51 10E6/UL (ref 3.8–5.2)
WBC # BLD AUTO: 5.3 10E3/UL (ref 4–11)

## 2021-09-13 PROCEDURE — 36415 COLL VENOUS BLD VENIPUNCTURE: CPT | Performed by: FAMILY MEDICINE

## 2021-09-13 PROCEDURE — 86618 LYME DISEASE ANTIBODY: CPT | Performed by: FAMILY MEDICINE

## 2021-09-13 PROCEDURE — 99214 OFFICE O/P EST MOD 30 MIN: CPT | Performed by: FAMILY MEDICINE

## 2021-09-13 PROCEDURE — 84443 ASSAY THYROID STIM HORMONE: CPT | Performed by: FAMILY MEDICINE

## 2021-09-13 PROCEDURE — 85025 COMPLETE CBC W/AUTO DIFF WBC: CPT | Performed by: FAMILY MEDICINE

## 2021-09-13 NOTE — TELEPHONE ENCOUNTER
RN Triage:    Spoke with 31 yr old Nadine who c/o:    Pt reports cough, headache, runny nose/stuffy nose, body aches, fatigue fever up to 101 axillary (102.0).  Symptoms began 2.5 weeks ago.    Cough, headache, stuffy nose and fever have resolved over 1 week ago.    Still feels fatigued, with body aches.  Pt states she feels like she has worked out.    Not able to do normal activities..     Pt would like evaluation states she has been sick more than 3 times this year.      PLAN:  Advised OV within the next few days.  Pt intends to go to [facility] today.  Advised to call back if symptoms worsen.    Beti Verdugo RN  [facility]      Reason for Disposition    [1] COVID-19 infection suspected by caller or triager AND [2] mild symptoms (cough, fever, or others) AND [3] no complications or SOB    Additional Information    Negative: SEVERE difficulty breathing (e.g., struggling for each breath, speaks in single words)    Negative: Difficult to awaken or acting confused (e.g., disoriented, slurred speech)    Negative: Bluish (or gray) lips or face now    Negative: Shock suspected (e.g., cold/pale/clammy skin, too weak to stand, low BP, rapid pulse)    Negative: Sounds like a life-threatening emergency to the triager    Negative: [1] COVID-19 exposure AND [2] has not completed COVID-19 vaccine series AND [3] no symptoms    Negative: [1] COVID-19 exposure AND [2] completed COVID-19 vaccine series (fully vaccinated) AND [3] no symptoms    Negative: COVID-19 vaccine reaction suspected (e.g., fever, headache, muscle aches) occurring during days 1-3 after getting vaccine    Negative: COVID-19 vaccine, questions about    Negative: [1] COVID-19 vaccine series completed (fully vaccinated) in past 3 months AND [2] new-onset of COVID-19 symptoms BUT [3] no known exposure    Negative: [1] Had lab test confirmed COVID-19 infection within last 3 monthsAND [2] new-onset of COVID-19 symptoms BUT [3] no known exposure     Negative: [1] Lives with someone known to have influenza (flu test positive) AND [2] flu-like symptoms (e.g., cough, runny nose, sore throat, SOB; with or without fever)    Negative: [1] Adult with possible COVID-19 symptoms AND [2] triager concerned about severity of symptoms or other causes    Negative: COVID-19 and breastfeeding, questions about    Negative: SEVERE or constant chest pain or pressure (Exception: mild central chest pain, present only when coughing)    Negative: MODERATE difficulty breathing (e.g., speaks in phrases, SOB even at rest, pulse 100-120)    Negative: [1] Headache AND [2] stiff neck (can't touch chin to chest)    Negative: MILD difficulty breathing (e.g., minimal/no SOB at rest, SOB with walking, pulse <100)    Negative: Chest pain or pressure    Negative: Patient sounds very sick or weak to the triager    Negative: Fever > 103 F (39.4 C)    Negative: [1] Fever > 101 F (38.3 C) AND [2] age > 60 years    Negative: [1] Fever > 100.0 F (37.8 C) AND [2] bedridden (e.g., nursing home patient, CVA, chronic illness, recovering from surgery)    Negative: [1] HIGH RISK patient (e.g., age > 64 years, diabetes, heart or lung disease, weak immune system) AND [2] new or worsening symptoms    Negative: [1] HIGH RISK patient AND [2] influenza is widespread in the community AND [3] ONE OR MORE respiratory symptoms: cough, sore throat, runny or stuffy nose    Negative: [1] HIGH RISK patient AND [2] influenza exposure within the last 7 days AND [3] ONE OR MORE respiratory symptoms: cough, sore throat, runny or stuffy nose    Negative: Fever present > 3 days (72 hours)    Negative: [1] Fever returns after gone for over 24 hours AND [2] symptoms worse or not improved    Negative: [1] Continuous (nonstop) coughing interferes with work or school AND [2] no improvement using cough treatment per protocol    Protocols used: CORONAVIRUS (COVID-19) DIAGNOSED OR [id number]-OD 3.25

## 2021-09-14 LAB — TSH SERPL DL<=0.005 MIU/L-ACNC: 0.38 UIU/ML (ref 0.3–5)

## 2021-09-14 NOTE — PATIENT INSTRUCTIONS
We will check your thyroid function, complete blood count, and check for Lyme disease as well to further evaluate why you have been feeling so fatigued.  Fatigue may be due to long lasting chronic symptoms from your previous Covid infection.  All of your lab work is normal.  You may want to follow-up with your primary doctor if your symptoms are getting worse or not improving over the next couple of weeks.

## 2021-09-15 LAB — B BURGDOR IGG+IGM SER QL: 0.07

## 2021-10-11 ENCOUNTER — HEALTH MAINTENANCE LETTER (OUTPATIENT)
Age: 31
End: 2021-10-11

## 2022-01-10 DIAGNOSIS — Z20.822 EXPOSURE TO COVID-19 VIRUS: Primary | ICD-10-CM

## 2022-01-11 ENCOUNTER — LAB (OUTPATIENT)
Dept: LAB | Facility: CLINIC | Age: 32
End: 2022-01-11
Attending: FAMILY MEDICINE
Payer: COMMERCIAL

## 2022-01-11 DIAGNOSIS — Z20.822 EXPOSURE TO COVID-19 VIRUS: ICD-10-CM

## 2022-01-11 PROCEDURE — U0005 INFEC AGEN DETEC AMPLI PROBE: HCPCS

## 2022-01-11 PROCEDURE — U0003 INFECTIOUS AGENT DETECTION BY NUCLEIC ACID (DNA OR RNA); SEVERE ACUTE RESPIRATORY SYNDROME CORONAVIRUS 2 (SARS-COV-2) (CORONAVIRUS DISEASE [COVID-19]), AMPLIFIED PROBE TECHNIQUE, MAKING USE OF HIGH THROUGHPUT TECHNOLOGIES AS DESCRIBED BY CMS-2020-01-R: HCPCS

## 2022-01-12 LAB — SARS-COV-2 RNA RESP QL NAA+PROBE: NEGATIVE

## 2022-06-15 ENCOUNTER — E-VISIT (OUTPATIENT)
Dept: URGENT CARE | Facility: CLINIC | Age: 32
End: 2022-06-15
Payer: COMMERCIAL

## 2022-06-15 DIAGNOSIS — N94.9 VAGINAL DISCOMFORT: Primary | ICD-10-CM

## 2022-06-15 PROCEDURE — 99207 PR NON-BILLABLE SERV PER CHARTING: CPT | Performed by: FAMILY MEDICINE

## 2022-06-15 NOTE — PATIENT INSTRUCTIONS
Dear Nadine Kilgore,    We are sorry you are not feeling well. Based on the responses you provided, it is recommended that you be seen in-person in urgent care so we can better evaluate your symptoms. Please click here to find the nearest urgent care location to you.   You will not be charged for this Visit. Thank you for trusting us with your care.    Dennise Sanchez MD

## 2022-06-17 ENCOUNTER — OFFICE VISIT (OUTPATIENT)
Dept: URGENT CARE | Facility: URGENT CARE | Age: 32
End: 2022-06-17
Payer: COMMERCIAL

## 2022-06-17 VITALS
OXYGEN SATURATION: 98 % | WEIGHT: 148 LBS | RESPIRATION RATE: 16 BRPM | BODY MASS INDEX: 24.66 KG/M2 | TEMPERATURE: 97.3 F | DIASTOLIC BLOOD PRESSURE: 64 MMHG | HEIGHT: 65 IN | SYSTOLIC BLOOD PRESSURE: 97 MMHG | HEART RATE: 73 BPM

## 2022-06-17 DIAGNOSIS — B37.31 YEAST INFECTION OF THE VAGINA: Primary | ICD-10-CM

## 2022-06-17 DIAGNOSIS — R30.0 DYSURIA: ICD-10-CM

## 2022-06-17 LAB
ALBUMIN UR-MCNC: NEGATIVE MG/DL
APPEARANCE UR: CLEAR
BILIRUB UR QL STRIP: NEGATIVE
CLUE CELLS: ABNORMAL
COLOR UR AUTO: YELLOW
GLUCOSE UR STRIP-MCNC: NEGATIVE MG/DL
HGB UR QL STRIP: NEGATIVE
KETONES UR STRIP-MCNC: NEGATIVE MG/DL
LEUKOCYTE ESTERASE UR QL STRIP: ABNORMAL
NITRATE UR QL: NEGATIVE
PH UR STRIP: 7 [PH] (ref 5–7)
RBC #/AREA URNS AUTO: NORMAL /HPF
SP GR UR STRIP: 1.01 (ref 1–1.03)
TRICHOMONAS, WET PREP: ABNORMAL
UROBILINOGEN UR STRIP-ACNC: 0.2 E.U./DL
WBC #/AREA URNS AUTO: NORMAL /HPF
WBC'S/HIGH POWER FIELD, WET PREP: ABNORMAL
YEAST, WET PREP: PRESENT

## 2022-06-17 PROCEDURE — 81001 URINALYSIS AUTO W/SCOPE: CPT | Performed by: NURSE PRACTITIONER

## 2022-06-17 PROCEDURE — 87210 SMEAR WET MOUNT SALINE/INK: CPT | Performed by: NURSE PRACTITIONER

## 2022-06-17 PROCEDURE — 99214 OFFICE O/P EST MOD 30 MIN: CPT | Performed by: NURSE PRACTITIONER

## 2022-06-17 RX ORDER — FLUCONAZOLE 150 MG/1
150 TABLET ORAL
Qty: 2 TABLET | Refills: 0 | Status: SHIPPED | OUTPATIENT
Start: 2022-06-20 | End: 2022-06-24

## 2022-06-17 NOTE — PATIENT INSTRUCTIONS
Vaginal yeast  Bathe in mostly just water  Exam unremarkable, no concerns for HSV or molluscum    Zyrtec, benadryl, flonase for inner ear pressure, fluid feeling
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
## 2022-08-08 ENCOUNTER — OFFICE VISIT (OUTPATIENT)
Dept: URGENT CARE | Facility: URGENT CARE | Age: 32
End: 2022-08-08
Payer: COMMERCIAL

## 2022-08-08 VITALS
OXYGEN SATURATION: 97 % | SYSTOLIC BLOOD PRESSURE: 103 MMHG | TEMPERATURE: 98 F | HEART RATE: 57 BPM | DIASTOLIC BLOOD PRESSURE: 69 MMHG

## 2022-08-08 DIAGNOSIS — N89.8 VAGINAL DISCHARGE: ICD-10-CM

## 2022-08-08 DIAGNOSIS — B37.31 CANDIDIASIS OF VULVA AND VAGINA: Primary | ICD-10-CM

## 2022-08-08 LAB
CLUE CELLS: ABNORMAL
TRICHOMONAS, WET PREP: ABNORMAL
WBC'S/HIGH POWER FIELD, WET PREP: ABNORMAL
YEAST, WET PREP: PRESENT

## 2022-08-08 PROCEDURE — 99213 OFFICE O/P EST LOW 20 MIN: CPT | Performed by: PHYSICIAN ASSISTANT

## 2022-08-08 PROCEDURE — 87210 SMEAR WET MOUNT SALINE/INK: CPT

## 2022-08-08 RX ORDER — FLUCONAZOLE 150 MG/1
150 TABLET ORAL DAILY
Qty: 3 TABLET | Refills: 0 | Status: SHIPPED | OUTPATIENT
Start: 2022-08-08 | End: 2023-04-12

## 2022-08-08 NOTE — PROGRESS NOTES
Vaginal discharge  - fluconazole (DIFLUCAN) 150 MG tablet; Take 1 tablet (150 mg) by mouth daily    Candidiasis of vulva and vagina  - Wet prep - Clinic Collect        Vaginal Infection: Yeast (Candidiasis)  Yeast infection occurs when yeast in the vagina increase and attacks the vaginal tissues. Yeast is a type of fungus. These infections are often caused by a type of yeast called Candida albicans. Other species of yeast can also cause infections. Factors that may make infection more likely include recent antibiotic use, douching, or increased sex. Yeast infections are more common in women who have diabetes, or are obese or pregnant, or have a weak immune system.   Symptoms of yeast infection    Clumpy or thin, white discharge, which may look like cottage cheese    No odor or minimal odor    Severe vaginal itching or burning    Burning with urination    Swelling, redness of vulva    Pain during sex     Treating yeast infection  Yeast infection is treated with a vaginal antifungal cream. In some cases, antifungal pills are prescribed instead. During treatment:     Finish all of your medicine, even if your symptoms go away.    Apply the cream before going to bed. Lie flat after applying so that it doesn't drip out.    Don't douche or use tampons.    Don't rely on a diaphragm or condoms, since the cream may weaken them.    Avoid intercourse if advised by your healthcare provider.  Should I treat a yeast infection myself?  Discuss with your healthcare provider whether you should use over-the-counter medicines to treat a yeast infection. Self-treatment may depend on whether:     You've had a yeast infection in the past.    You're at risk for sexually transmitted infections (STIs).  Call your healthcare provider if symptoms don't go away or come back after treatment.   [employer] last reviewed this educational content on 4/1/2020 2000-2021 [employer]. All rights reserved. This information is not  intended as a substitute for professional medical care. Always follow your healthcare professional's instructions.                 CAIO Ramírez [facility] URGENT [facility]    Subjective   32 year old who presents to clinic today for the following health issues:    Vaginal Problem       HPI     Vaginal Symptoms  Onset/Duration: Pt is having more than normal discharge from the vagina and itchy but there is no pain, no smell to odor, no pain while peeing, symptoms started 5 days ago  Description:  Vaginal Discharge: white   Itching (Pruritis): YES  Burning sensation:  No  Odor: No  Accompanying Signs & Symptoms:  Urinary symptoms: No  Abdominal pain: No  Fever: No  History:   Sexually active: No  New Partner: No  Possibility of Pregnancy:  No  Recent antibiotic use: No  Previous vaginitis issues: YES  Precipitating or alleviating factors: None   Therapies tried and outcome: none    Review of Systems   Review of Systems   See HPI     Objective    Temp: 98  F (36.7  C) Temp src: Oral BP: 103/69 Pulse: 57     SpO2: 97 %       Physical Exam   Physical Exam  Constitutional:       General: She is not in acute distress.     Appearance: Normal appearance. She is normal weight. She is not ill-appearing, toxic-appearing or diaphoretic.   HENT:      Head: Normocephalic and atraumatic.   Cardiovascular:      Rate and Rhythm: Normal rate.      Pulses: Normal pulses.   Pulmonary:      Effort: Pulmonary effort is normal. No respiratory distress.   Neurological:      Mental Status: She is alert.   Psychiatric:         Mood and Affect: Mood normal.         Behavior: Behavior normal.         Thought Content: Thought content normal.         Judgment: Judgment normal.          Results for orders placed or performed in visit on 08/08/22 (from the past 24 hour(s))   Wet prep - Clinic Collect    Specimen: Vagina; Swab   Result Value Ref Range    Trichomonas Absent Absent    Yeast Present (A) Absent    Clue Cells Absent Absent     WBCs/high power field 2+ (A) None

## 2022-08-15 ENCOUNTER — TELEPHONE (OUTPATIENT)
Dept: FAMILY MEDICINE | Facility: CLINIC | Age: 32
End: 2022-08-15

## 2022-08-15 NOTE — TELEPHONE ENCOUNTER
Reason for Call:  Other vaccines    Detailed comments: PT called and wanted immunization record with TB but it's not on her record. Pt says she did for sure got it done within [facility]. Please call and advise. Thank you!    Phone Number Patient can be reached at: Home number on file [phone] (home)    Best Time: any    Can we leave a detailed message on this number? YES    Call taken on 8/15/2022 at 9:35 AM by Trena Tesfaye

## 2022-08-15 NOTE — TELEPHONE ENCOUNTER
"Attempted to call pt. Got message stating \"unable to complete you call at this time\". Upon return call please let pt know she did have a blood draw for TB gold in 2019.   \" Okay to relay message\"    "

## 2022-08-16 NOTE — TELEPHONE ENCOUNTER
Called and spoke to pt and let her know I printed her TB gold an immunization record and let it at the  for

## 2022-08-29 ENCOUNTER — TELEPHONE (OUTPATIENT)
Dept: FAMILY MEDICINE | Facility: CLINIC | Age: 32
End: 2022-08-29

## 2022-08-29 ENCOUNTER — OFFICE VISIT (OUTPATIENT)
Dept: FAMILY MEDICINE | Facility: CLINIC | Age: 32
End: 2022-08-29
Payer: COMMERCIAL

## 2022-08-29 VITALS
HEART RATE: 67 BPM | TEMPERATURE: 98.1 F | SYSTOLIC BLOOD PRESSURE: 107 MMHG | WEIGHT: 153 LBS | DIASTOLIC BLOOD PRESSURE: 72 MMHG | BODY MASS INDEX: 25.46 KG/M2

## 2022-08-29 DIAGNOSIS — Z11.1 SCREENING FOR TUBERCULOSIS: Primary | ICD-10-CM

## 2022-08-29 PROCEDURE — 86481 TB AG RESPONSE T-CELL SUSP: CPT | Performed by: FAMILY MEDICINE

## 2022-08-29 PROCEDURE — 36415 COLL VENOUS BLD VENIPUNCTURE: CPT | Performed by: FAMILY MEDICINE

## 2022-08-29 PROCEDURE — 99207 PR NO CHARGE LOS: CPT | Performed by: FAMILY MEDICINE

## 2022-08-29 ASSESSMENT — PATIENT HEALTH QUESTIONNAIRE - PHQ9
10. IF YOU CHECKED OFF ANY PROBLEMS, HOW DIFFICULT HAVE THESE PROBLEMS MADE IT FOR YOU TO DO YOUR WORK, TAKE CARE OF THINGS AT HOME, OR GET ALONG WITH OTHER PEOPLE: NOT DIFFICULT AT ALL
SUM OF ALL RESPONSES TO PHQ QUESTIONS 1-9: 2
SUM OF ALL RESPONSES TO PHQ QUESTIONS 1-9: 2

## 2022-08-29 NOTE — PROGRESS NOTES
..   Patient had to leave prior to being seen.  I did place order for QuantiFERON gold for her.  Attempted to call her at the end of the day to do a telephone visit but there is no answer left message.    Answers for HPI/ROS submitted by the patient on 8/29/2022  If you checked off any problems, how difficult have these problems made it for you to do your work, take care of things at home, or get along with other people?: Not difficult at all  PHQ9 TOTAL SCORE: 2  What is the reason for your visit today? : General check-up/Update Immunizations  How many servings of fruits and vegetables do you eat daily?: 4 or more  On average, how many sweetened beverages do you drink each day (Examples: soda, juice, sweet tea, etc.  Do NOT count diet or artificially sweetened beverages)?: 0  How many minutes a day do you exercise enough to make your heart beat faster?: 30 to 60  How many days a week do you exercise enough to make your heart beat faster?: 4

## 2022-08-30 NOTE — TELEPHONE ENCOUNTER
RN called patient to follow up Dr. Lang's message below.      Patient is very upset and stated that she waited for an hour and 30 minutes yesterday and did not get seen.        When RN asked if there is anything I can do, patient did not answer.  Phone call was disconnected.      RN will route this encounter to  as DALY.        Mary Morales RN  [facility]

## 2022-08-30 NOTE — TELEPHONE ENCOUNTER
Please call patient.  She had to leave before she could be seen yesterday as we were very far behind.  Please see if she wanted to try to do a telephone visit this week.  I tried to call her at the end of my clinic day yesterday but there was no answer and left a message.  I am not sure what concerns she wanted to address as looks like she went to urgent care a few weeks ago for her vaginitis concerns.

## 2022-08-31 LAB
GAMMA INTERFERON BACKGROUND BLD IA-ACNC: 2.1 IU/ML
M TB IFN-G BLD-IMP: POSITIVE
M TB IFN-G CD4+ BCKGRND COR BLD-ACNC: 7.9 IU/ML
MITOGEN IGNF BCKGRD COR BLD-ACNC: 7.86 IU/ML
MITOGEN IGNF BCKGRD COR BLD-ACNC: 7.9 IU/ML
QUANTIFERON MITOGEN: 10 IU/ML
QUANTIFERON NIL TUBE: 2.1 IU/ML
QUANTIFERON TB1 TUBE: 10 IU/ML
QUANTIFERON TB2 TUBE: 9.96

## 2022-09-07 ENCOUNTER — MYC MEDICAL ADVICE (OUTPATIENT)
Dept: FAMILY MEDICINE | Facility: CLINIC | Age: 32
End: 2022-09-07

## 2022-09-07 DIAGNOSIS — Z22.7 LATENT TUBERCULOSIS: Primary | ICD-10-CM

## 2022-09-07 NOTE — LETTER
September 18, 2022      Nadine Kilgore  [address] [place] [address]      To Whom It May Concern:         Nadine Kilgore has been successfully treated for Latent Tuberculosis Infection, per Centers for Disease Control and Prevention and [place] Department of Health guidelines. Treatment was started on 12/26/2019 and completed on 03/12/2020. Per guidelines, patient has had the appropriate follow-up cares and at this time does not require further follow-up for the Latent TB infection.  In addition to this she had a negative CXR on 9/14/22 and is asymptomatic.                     Please call with questions at [phone]       Sincerely,        Alise Lang, DO

## 2022-09-12 NOTE — TELEPHONE ENCOUNTER
Pt would do the chest x-ray,  But she would like to get it done at [facility] instead as it is closer to her. Please put in a chest x-ray order. Pt would like to get it done today. Please call pt once order is put in so she knows to call the [facility] to schedule in her chest x-ray. Thank you!

## 2022-09-14 ENCOUNTER — ANCILLARY PROCEDURE (OUTPATIENT)
Dept: GENERAL RADIOLOGY | Facility: CLINIC | Age: 32
End: 2022-09-14
Attending: FAMILY MEDICINE
Payer: COMMERCIAL

## 2022-09-14 DIAGNOSIS — Z22.7 LATENT TUBERCULOSIS: ICD-10-CM

## 2022-09-14 PROCEDURE — 71046 X-RAY EXAM CHEST 2 VIEWS: CPT | Mod: GC | Performed by: RADIOLOGY

## 2022-09-24 ENCOUNTER — HEALTH MAINTENANCE LETTER (OUTPATIENT)
Age: 32
End: 2022-09-24

## 2022-09-28 ENCOUNTER — LAB (OUTPATIENT)
Dept: LAB | Facility: CLINIC | Age: 32
End: 2022-09-28
Payer: COMMERCIAL

## 2022-09-28 ENCOUNTER — NURSE TRIAGE (OUTPATIENT)
Dept: NURSING | Facility: CLINIC | Age: 32
End: 2022-09-28

## 2022-09-28 DIAGNOSIS — Z11.1 SCREENING FOR TUBERCULOSIS: Primary | ICD-10-CM

## 2022-09-29 ENCOUNTER — LAB (OUTPATIENT)
Dept: LAB | Facility: CLINIC | Age: 32
End: 2022-09-29
Payer: COMMERCIAL

## 2022-09-29 DIAGNOSIS — Z01.89 LABORATORY TEST: ICD-10-CM

## 2022-09-29 PROCEDURE — 86762 RUBELLA ANTIBODY: CPT

## 2022-09-29 PROCEDURE — 36415 COLL VENOUS BLD VENIPUNCTURE: CPT

## 2022-09-29 PROCEDURE — 86765 RUBEOLA ANTIBODY: CPT

## 2022-09-29 PROCEDURE — 86735 MUMPS ANTIBODY: CPT

## 2022-09-30 LAB
MEV IGG SER IA-ACNC: 101 AU/ML
MEV IGG SER IA-ACNC: POSITIVE
MUMPS ANTIBODY IGG INSTRUMENT VALUE: 288 AU/ML
MUV IGG SER QL IA: POSITIVE
RUBV IGG SERPL QL IA: 11.6 INDEX
RUBV IGG SERPL QL IA: POSITIVE

## 2022-10-12 ENCOUNTER — MEDICAL CORRESPONDENCE (OUTPATIENT)
Dept: HEALTH INFORMATION MANAGEMENT | Facility: CLINIC | Age: 32
End: 2022-10-12

## 2023-04-12 NOTE — PATIENT INSTRUCTIONS
Hx latent TB  Preventive Health Recommendations  Female Ages 26 - 39  Yearly exam:   See your health care provider every year in order to  Review health changes.   Discuss preventive care.    Review your medicines if you your doctor has prescribed any.    Until age 30: Get a Pap test every three years (more often if you have had an abnormal result).    After age 30: Talk to your doctor about whether you should have a Pap test every 3 years or have a Pap test with HPV screening every 5 years.   You do not need a Pap test if your uterus was removed (hysterectomy) and you have not had cancer.  You should be tested each year for STDs (sexually transmitted diseases), if you're at risk.   Talk to your provider about how often to have your cholesterol checked.  If you are at risk for diabetes, you should have a diabetes test (fasting glucose).  Shots: Get a flu shot each year. Get a tetanus shot every 10 years.   Nutrition:   Eat at least 5 servings of fruits and vegetables each day.  Eat whole-grain bread, whole-wheat pasta and brown rice instead of white grains and rice.  Get adequate Calcium and Vitamin D.     Lifestyle  Exercise at least 150 minutes a week (30 minutes a day, 5 days of the week). This will help you control your weight and prevent disease.  Limit alcohol to one drink per day.  No smoking.   Wear sunscreen to prevent skin cancer.  See your dentist every six months for an exam and cleaning.

## 2023-04-13 ENCOUNTER — OFFICE VISIT (OUTPATIENT)
Dept: FAMILY MEDICINE | Facility: CLINIC | Age: 33
End: 2023-04-13
Payer: COMMERCIAL

## 2023-04-13 VITALS
WEIGHT: 158.6 LBS | HEART RATE: 68 BPM | BODY MASS INDEX: 26.42 KG/M2 | OXYGEN SATURATION: 100 % | TEMPERATURE: 97 F | HEIGHT: 65 IN | SYSTOLIC BLOOD PRESSURE: 92 MMHG | DIASTOLIC BLOOD PRESSURE: 55 MMHG | RESPIRATION RATE: 16 BRPM

## 2023-04-13 DIAGNOSIS — Z00.00 ROUTINE GENERAL MEDICAL EXAMINATION AT A HEALTH CARE FACILITY: Primary | ICD-10-CM

## 2023-04-13 DIAGNOSIS — Z11.59 NEED FOR HEPATITIS C SCREENING TEST: ICD-10-CM

## 2023-04-13 DIAGNOSIS — Z12.4 CERVICAL CANCER SCREENING: ICD-10-CM

## 2023-04-13 DIAGNOSIS — Z13.220 SCREENING FOR HYPERLIPIDEMIA: ICD-10-CM

## 2023-04-13 LAB
CHOLEST SERPL-MCNC: 158 MG/DL
HCV AB SERPL QL IA: NONREACTIVE
HDLC SERPL-MCNC: 63 MG/DL
LDLC SERPL CALC-MCNC: 83 MG/DL
NONHDLC SERPL-MCNC: 95 MG/DL
TRIGL SERPL-MCNC: 60 MG/DL

## 2023-04-13 PROCEDURE — 99395 PREV VISIT EST AGE 18-39: CPT | Performed by: STUDENT IN AN ORGANIZED HEALTH CARE EDUCATION/TRAINING PROGRAM

## 2023-04-13 PROCEDURE — G0145 SCR C/V CYTO,THINLAYER,RESCR: HCPCS | Performed by: STUDENT IN AN ORGANIZED HEALTH CARE EDUCATION/TRAINING PROGRAM

## 2023-04-13 PROCEDURE — 87624 HPV HI-RISK TYP POOLED RSLT: CPT | Performed by: STUDENT IN AN ORGANIZED HEALTH CARE EDUCATION/TRAINING PROGRAM

## 2023-04-13 PROCEDURE — 36415 COLL VENOUS BLD VENIPUNCTURE: CPT | Performed by: STUDENT IN AN ORGANIZED HEALTH CARE EDUCATION/TRAINING PROGRAM

## 2023-04-13 PROCEDURE — 86803 HEPATITIS C AB TEST: CPT | Performed by: STUDENT IN AN ORGANIZED HEALTH CARE EDUCATION/TRAINING PROGRAM

## 2023-04-13 PROCEDURE — 80061 LIPID PANEL: CPT | Performed by: STUDENT IN AN ORGANIZED HEALTH CARE EDUCATION/TRAINING PROGRAM

## 2023-04-13 ASSESSMENT — ENCOUNTER SYMPTOMS
ARTHRALGIAS: 0
HEMATURIA: 0
SHORTNESS OF BREATH: 0
ABDOMINAL PAIN: 0
HEMATOCHEZIA: 0
HEARTBURN: 0
NERVOUS/ANXIOUS: 0
WEAKNESS: 0
CONSTIPATION: 0
BREAST MASS: 0
NAUSEA: 0
MYALGIAS: 0
PARESTHESIAS: 0
EYE PAIN: 0
DIZZINESS: 0
DYSURIA: 0
PALPITATIONS: 0
FREQUENCY: 0
FEVER: 0
COUGH: 0
JOINT SWELLING: 0
DIARRHEA: 0
SORE THROAT: 0
HEADACHES: 0
CHILLS: 0

## 2023-04-13 ASSESSMENT — PATIENT HEALTH QUESTIONNAIRE - PHQ9
SUM OF ALL RESPONSES TO PHQ QUESTIONS 1-9: 2
SUM OF ALL RESPONSES TO PHQ QUESTIONS 1-9: 2
10. IF YOU CHECKED OFF ANY PROBLEMS, HOW DIFFICULT HAVE THESE PROBLEMS MADE IT FOR YOU TO DO YOUR WORK, TAKE CARE OF THINGS AT HOME, OR GET ALONG WITH OTHER PEOPLE: SOMEWHAT DIFFICULT

## 2023-04-13 ASSESSMENT — PAIN SCALES - GENERAL: PAINLEVEL: NO PAIN (0)
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
## 2023-04-17 LAB
BKR LAB AP GYN ADEQUACY: NORMAL
BKR LAB AP GYN INTERPRETATION: NORMAL
BKR LAB AP HPV REFLEX: NORMAL
BKR LAB AP PREVIOUS ABNORMAL: NORMAL
PATH REPORT.COMMENTS IMP SPEC: NORMAL
PATH REPORT.COMMENTS IMP SPEC: NORMAL
PATH REPORT.RELEVANT HX SPEC: NORMAL

## 2023-04-19 LAB
HUMAN PAPILLOMA VIRUS 16 DNA: NEGATIVE
HUMAN PAPILLOMA VIRUS 18 DNA: NEGATIVE
HUMAN PAPILLOMA VIRUS FINAL DIAGNOSIS: NORMAL
HUMAN PAPILLOMA VIRUS OTHER HR: NEGATIVE

## 2023-05-23 ENCOUNTER — OFFICE VISIT (OUTPATIENT)
Dept: URGENT CARE | Facility: URGENT CARE | Age: 33
End: 2023-05-23
Payer: COMMERCIAL

## 2023-05-23 VITALS
TEMPERATURE: 99.3 F | HEART RATE: 62 BPM | SYSTOLIC BLOOD PRESSURE: 102 MMHG | OXYGEN SATURATION: 100 % | RESPIRATION RATE: 14 BRPM | DIASTOLIC BLOOD PRESSURE: 53 MMHG | WEIGHT: 158.6 LBS | BODY MASS INDEX: 26.39 KG/M2

## 2023-05-23 DIAGNOSIS — N75.0 BARTHOLIN GLAND CYST: Primary | ICD-10-CM

## 2023-05-23 PROCEDURE — 99214 OFFICE O/P EST MOD 30 MIN: CPT | Performed by: NURSE PRACTITIONER

## 2023-05-23 RX ORDER — SULFAMETHOXAZOLE/TRIMETHOPRIM 800-160 MG
1 TABLET ORAL 2 TIMES DAILY
Qty: 20 TABLET | Refills: 0 | Status: SHIPPED | OUTPATIENT
Start: 2023-05-23 | End: 2023-06-02

## 2023-05-23 RX ORDER — LIDOCAINE 50 MG/G
OINTMENT TOPICAL PRN
Qty: 30 G | Refills: 0 | Status: SHIPPED | OUTPATIENT
Start: 2023-05-23 | End: 2023-05-23

## 2023-05-23 RX ORDER — LIDOCAINE 40 MG/G
CREAM TOPICAL 3 TIMES DAILY PRN
Qty: 5 G | Refills: 0 | Status: SHIPPED | OUTPATIENT
Start: 2023-05-23 | End: 2023-06-02

## 2023-05-23 RX ORDER — SULFAMETHOXAZOLE/TRIMETHOPRIM 800-160 MG
1 TABLET ORAL 2 TIMES DAILY
Qty: 20 TABLET | Refills: 0 | Status: SHIPPED | OUTPATIENT
Start: 2023-05-23 | End: 2023-05-23

## 2023-05-23 NOTE — PATIENT INSTRUCTIONS
Epsom salt warm soapy baths  Cool compress witch hazel  Bactrim  Lidocaine  OBGYN urgent referral for I&D and possible dupont catheter  ER if severe worsening pain, fevers, vomiting, more swelling, difficulty urinating
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
## 2023-05-24 ENCOUNTER — OFFICE VISIT (OUTPATIENT)
Dept: OBGYN | Facility: CLINIC | Age: 33
End: 2023-05-24
Payer: COMMERCIAL

## 2023-05-24 VITALS
HEART RATE: 78 BPM | OXYGEN SATURATION: 100 % | WEIGHT: 158 LBS | DIASTOLIC BLOOD PRESSURE: 63 MMHG | SYSTOLIC BLOOD PRESSURE: 98 MMHG | BODY MASS INDEX: 26.29 KG/M2

## 2023-05-24 DIAGNOSIS — N75.0 BARTHOLIN CYST: Primary | ICD-10-CM

## 2023-05-24 PROCEDURE — 99203 OFFICE O/P NEW LOW 30 MIN: CPT | Performed by: OBSTETRICS & GYNECOLOGY
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
## 2023-05-24 NOTE — PATIENT INSTRUCTIONS
If the cyst gets larger or worse in any other fashion, please call so we can see you the same day for drainage.  [phone].  Patient Education     Bartholin s Cyst: Common Treatments    The Bartholin s glands are a pair of very small glands found inside the labia (vaginal lips). There is one gland on either side. The glands produce fluid to help keep the vagina moist. When the opening of a Bartholin s gland becomes blocked, the gland may swell and form a cyst. The cyst may vary in size from small to large (1 to 3 cm in size). It may feel like a firm lump within the labia.   Treatment depends on various factors. These include the size of the cyst, whether it causes symptoms, and whether it s infected. Small or uninfected cysts don t usually need treatment. Large cysts and those that are painful or infected will likely need treatment. Below are some common treatments that may be done:   Incision and drainage. In this procedure, the area over the cyst is numbed, cut, and drained. Often, a thin tube (catheter) with a balloon on the end is then inserted into the empty cyst. This allows for further drainage of fluid. The catheter is left in place for 4 to 6 weeks. It's then removed by the healthcare provider.  Marsupialization. With this procedure, the area over the cyst is numbed. The cyst is cut and drained. The edges of the skin are then stitched to create a small opening that will allow for further drainage of fluid.  Excision. This procedure is done in the operating room. It is often reserved for women for whom other treatments haven't worked.  If you have cysts that keep coming back, you may need other treatments. Your provider can tell you more about these options.   If your cyst is infected, your healthcare provider may prescribe antibiotics. Most infections of Bartholin s cysts are from bacteria normally present in the vagina. Sometimes the bacteria may have been passed to you during sex. This is called a  sexually transmitted infection (STI). A test (culture) of the fluid can confirm if you have an STI.   Home care  Medicines  If antibiotics are prescribed, be sure to take them exactly as directed. Don t stop taking the medicine, even if you start to feel better. If the cause of your infection is an STI, any sexual partners you have will also need to be tested and treated.  If you have pain, use over-the-counter pain medicine as directed. If needed, stronger pain medicines can be prescribed.   General care  To help relieve discomfort, you may be advised to take sitz baths for the next few days. For this, you soak in bath filled with 2 to 3 inches of warm water for 10 to 15 minutes, a few times a day.  Don't have sex until the cyst has healed. If the cause of your infection is an STI, don't have sex until you and any partners you have are done with treatment.    Follow-up care  Follow up with your healthcare provider, or as directed. Be sure to keep all appointments. These are needed to ensure that you are recovering well after your treatment. If you have a catheter, your provider will let you know when to return to have it removed.   When to seek medical advice  Call your healthcare provider right away if any of these occur:  Fever does not go down or worsens  Increased redness, pain, swelling, or foul-smelling drainage from the cyst or the area around it  Pain in the lower abdomen   New rash or joint pain  Catheter falls out before the scheduled time to remove it  Job last reviewed this educational content on 7/1/2020 2000-2022 [employer]. All rights reserved. This information is not intended as a substitute for professional medical care. Always follow your healthcare professional's instructions.           Patient Education     Understanding Bartholin Cyst and Abscess     A woman has two Bartholin glands. They are located on the sides of the vaginal opening. These pea-sized glands make mucus.  This mucus lubricates the outside part of the vagina (vulva). If a tube (duct) in one of these glands becomes blocked, it can cause a cyst or abscess.  What causes a Bartholin cyst or abscess?  A cyst can form when the duct of a Bartholin gland becomes blocked. The mucus can t come out of the gland. It builds up. If the cyst becomes infected, it may turn into an abscess.  A blockage in the gland can occur from an injury or an infection such as a sexually transmitted infection (STI).  Symptoms of a Bartholin cyst or abscess  A Bartholin cyst starts as a small bump. It may cause no other symptoms. Or it may grow bigger. It can then cause swelling and pain. If an abscess forms, it can be very painful. You may have a fever. You may have trouble walking, sitting, or having sex.  Treatment for a Bartholin cyst or abscess  A cyst that doesn t cause any symptoms may not need to be treated. It may go away on its own. But if you feel discomfort or pain, treatment options include:  Medicine. Over-the-counter pain medicines can help. In some cases, you may need antibiotics if an infection is severe or a cyst or abscess comes back.  Sitz bath. Soaking the genital area in warm water can ease pain and sometimes help the cyst to drain on its own.  Drainage. Cutting open the cyst allows the fluid inside it to drain. This eases discomfort and pain. The healthcare provider may insert a tube (catheter) to help with drainage. This catheter may need to stay in place for up to 3 weeks.  Surgery. You may need to have the Bartholin glands removed if other treatments don t work.  When to call your healthcare provider  Call your healthcare provider right away if you have any of these:  Fever of 100.4 F (38 C) or higher, or as directed by your provider  Redness, swelling, or fluid leaking from the cyst that gets worse  Pain that gets worse  Symptoms that don t get better, or get worse  New symptoms  Job last reviewed this educational  content on 12/1/2019 2000-2022 [employer]. All rights reserved. This information is not intended as a substitute for professional medical care. Always follow your healthcare professional's instructions.

## 2023-11-20 NOTE — PROGRESS NOTES
"Office Problem Visit        Assessment/Plan:  1)  34 yo  presents with concern for vaginal bump check, slight rectal fullness  -Slight bulge noted inside vagina, likely secondary to stool in rectum. Discussed with Nadine that this is likely normal anatomy. Reviewed signs and symptoms of rectocele and encouraged Nadine to contact us with any concerns. Discussed availability of pelvic floor physical therapy if that is needed.   2) Due for preventive health exam 2024      Subjective:    Nadine is a 33 year old  female who presents for evaluation of a bump that she felt in the base of her vagina    She is a new patient to the [facility].   LMP 23, 4 days, moderate cycles are ~24-27 days  Not currently sexually active. Declines STD screen. Does not wish to discuss birth control options as she is not currently in a relationship  Currently in [employer] school, just finished a neuro exam and is grateful to have a few days off. She has 2 years left to go in her program  Has a 5 year old daughter.     Nadine has a history of a left bartholin's gland cyst that resolved with antibiotics and time.     She had a preventive health exam 2023 and is not due for a pap today.     Nadine recently felt inside her vagina, which she typically does not do, and felt a bulge or bump on the bottom of her vaginal and she is wondering if it is normal. She denies pain, itching, odor. Denies constipation or diarrhea or difficulty passing a bowel movement. Denies fecal incontinence, a sense of bulging or discomfort in her bottom.       Review of Systems:  Also a 12 point comprehensive review of systems was negative except as noted.     Medical, Surgical, Social and Family history reviewed.       Objective:   Vitals:    Vitals:    23 1314   BP: 105/74   Pulse: 83   Weight: 69.3 kg (152 lb 11.2 oz)   Height: 1.651 m (5' 5\")       Physical Exam:  General: Pleasant, articulate, " well-groomed, well-nourished female.  Not in any apparent distress.  Neck: Supple.  Lungs:  nonlabored breathing pattern.  External genitalia: Normal hair distribution, no lesions.  Urethral opening: Without lesions, or tenderness.   Bladder: Without masses, or tenderness.  Vagina: Pink, rugated, normal-appearing discharge. Slight bulge noted in base of vagina, no protrusion with bearing down  Cervix: multiparous, pink, smooth, no lesions.  Bimanual: Finds uterus small, mobile, nontender, no masses.  Negative CMT.  Adnexa, without masses or tenderness.    Lower extremities: +1 reflexes, no significant edema.    Time spent:  Chart review/Pre-chartin min on day of service  Face-to-face visit: 20  Documentation: 5  Total time spent on the day of service: 40 minutes      ARDEN Meyers, PERICO

## 2023-11-21 ENCOUNTER — OFFICE VISIT (OUTPATIENT)
Dept: OBGYN | Facility: CLINIC | Age: 33
End: 2023-11-21
Attending: ADVANCED PRACTICE MIDWIFE
Payer: COMMERCIAL

## 2023-11-21 VITALS
HEART RATE: 83 BPM | SYSTOLIC BLOOD PRESSURE: 105 MMHG | BODY MASS INDEX: 25.44 KG/M2 | DIASTOLIC BLOOD PRESSURE: 74 MMHG | HEIGHT: 65 IN | WEIGHT: 152.7 LBS

## 2023-11-21 DIAGNOSIS — R19.8 RECTAL FULLNESS: Primary | ICD-10-CM

## 2023-11-21 PROCEDURE — G0463 HOSPITAL OUTPT CLINIC VISIT: HCPCS | Performed by: ADVANCED PRACTICE MIDWIFE

## 2023-11-21 PROCEDURE — 99203 OFFICE O/P NEW LOW 30 MIN: CPT | Performed by: ADVANCED PRACTICE MIDWIFE

## 2023-11-21 NOTE — LETTER
2023       RE: Nadine Kilgore  [address]  [place] [address]     Dear Colleague,    Thank you for referring your patient, Nadine Kilgore, to the [facility] [place] [facility]. Please see a copy of my visit note below.    Office Problem Visit        Assessment/Plan:  1)  32 yo  presents with concern for vaginal bump check, slight rectal fullness  -Slight bulge noted inside vagina, likely secondary to stool in rectum. Discussed with Nadine that this is likely normal anatomy. Reviewed signs and symptoms of rectocele and encouraged Nadine to contact us with any concerns. Discussed availability of pelvic floor physical therapy if that is needed.   2) Due for preventive health exam 2024      Subjective:    Nadine is a 33 year old  female who presents for evaluation of a bump that she felt in the base of her vagina    She is a new patient to the [facility].   LMP 23, 4 days, moderate cycles are ~24-27 days  Not currently sexually active. Declines STD screen. Does not wish to discuss birth control options as she is not currently in a relationship  Currently in [employer] school, just finished a neuro exam and is grateful to have a few days off. She has 2 years left to go in her program  Has a 5 year old daughter.     Nadine has a history of a left bartholin's gland cyst that resolved with antibiotics and time.     She had a preventive health exam 2023 and is not due for a pap today.     Nadine recently felt inside her vagina, which she typically does not do, and felt a bulge or bump on the bottom of her vaginal and she is wondering if it is normal. She denies pain, itching, odor. Denies constipation or diarrhea or difficulty passing a bowel movement. Denies fecal incontinence, a sense of bulging or discomfort in her bottom.       Review of Systems:  Also a 12  "point comprehensive review of systems was negative except as noted.     Medical, Surgical, Social and Family history reviewed.       Objective:   Vitals:    Vitals:    23 1314   BP: 105/74   Pulse: 83   Weight: 69.3 kg (152 lb 11.2 oz)   Height: 1.651 m (5' 5\")       Physical Exam:  General: Pleasant, articulate, well-groomed, well-nourished female.  Not in any apparent distress.  Neck: Supple.  Lungs:  nonlabored breathing pattern.  External genitalia: Normal hair distribution, no lesions.  Urethral opening: Without lesions, or tenderness.   Bladder: Without masses, or tenderness.  Vagina: Pink, rugated, normal-appearing discharge. Slight bulge noted in base of vagina, no protrusion with bearing down  Cervix: multiparous, pink, smooth, no lesions.  Bimanual: Finds uterus small, mobile, nontender, no masses.  Negative CMT.  Adnexa, without masses or tenderness.    Lower extremities: +1 reflexes, no significant edema.    Time spent:  Chart review/Pre-chartin min on day of service  Face-to-face visit: 20  Documentation: 5  Total time spent on the day of service: 40 minutes      ARDEN Meyers, PERICO    "

## 2024-02-28 NOTE — PROGRESS NOTES
Nadine stopped by to see NABIL after her prenatal appt today. She lt me know that her boyfriend is still in the hospital but is doing much better. She is using the cab vouchers for when she does not have a ride to the hospital. NABIL had a girl baby bundle, and Nadine had already completed training, NABIL was just waiting on bundle delivery to clinic. NABIL also sent her home with two packs of diapers and some baby supplies. She is still working on her asylum paperwork. She received the documents needed from here home country and will be taking them to her /helper tomorrow.   No

## 2024-06-16 SDOH — HEALTH STABILITY: PHYSICAL HEALTH: ON AVERAGE, HOW MANY DAYS PER WEEK DO YOU ENGAGE IN MODERATE TO STRENUOUS EXERCISE (LIKE A BRISK WALK)?: 4 DAYS

## 2024-06-16 SDOH — HEALTH STABILITY: PHYSICAL HEALTH: ON AVERAGE, HOW MANY MINUTES DO YOU ENGAGE IN EXERCISE AT THIS LEVEL?: 30 MIN

## 2024-06-16 ASSESSMENT — SOCIAL DETERMINANTS OF HEALTH (SDOH): HOW OFTEN DO YOU GET TOGETHER WITH FRIENDS OR RELATIVES?: ONCE A WEEK

## 2024-06-16 NOTE — COMMUNITY RESOURCES LIST (ENGLISH)
June 16, 2024           YOUR PERSONALIZED LIST OF SERVICES & PROGRAMS           NAVIGATION    Eligibility Screening      [employer] - SNAP application assistance  [address] [address] (Distance: 2.0 miles)  Phone: [phone]  Language: English  Fee: Free  Accessibility: Translation services      [facility] Services  [address] [place] [address] (Distance: 0.9 miles)  Phone: [phone]  Website: https://P[employer]cite/seniors/  Language: English, Macedonian  Fee: Free, Self pay, Sliding scale  Accessibility: Translation services      [employer] [place] - SNAP (formerly food stamps) Screening and Application help  Phone: (352) [phone]  Website: https://www.[employer].org/programs/[place]-food-helpline/  Language: English  Hours: Mon 10:00 AM - 5:00 PM Tue 10:00 AM - 5:00 PM Wed 10:00 AM - 5:00 PM Thu 10:00 AM - 5:00 PM Fri 10:00 AM - 5:00 PM  Fee: Free  Accessibility: [place] accessible, Blind accommodation, Deaf or hard of hearing, Translation services        ASSISTANCE    Nutrition Benefits      [employer] - SNAP application assistance  [address] [address] (Distance: 2.0 miles)  Phone: [phone]  Language: English  Fee: Free  Accessibility: Translation services      [facility] application assistance - [facility]  [address]  [address] [place] [address] (Distance: 4.8 miles)  Phone: [phone]  Language: English, Macedonian  Fee: Free      [employer] [place] - SNAP (formerly food stamps) Screening and Application help  Phone: (444) 934-4160  Website: https://www.[employer].org/programs/[place]-food-helpline/  Language: English  Hours: Mon 10:00 AM - 5:00 PM Tue 10:00 AM - 5:00 PM Wed 10:00 AM - 5:00 PM Thu 10:00 AM - 5:00 PM Fri 10:00 AM - 5:00 PM  Fee:  Free  Accessibility: [place] accessible, Blind accommodation, Deaf or hard of hearing, Translation services    [employer] - Food in the '[place] at [facility]  [address] [place] [address] (Distance: 8.7 miles)  Phone: [phone]  Website: https://www."[employer]".org/our-programs/feeding-the-future/food-in-the-diaz/  Language: English  Fee: Free  Accessibility: [place] accessible      [facility] - Food pantry  [address] [place] [address] (Distance: 5.3 miles)  Phone: (442) [phone]  Website: http://www.Prenticeuuzuche.com/  Language: English  Fee: Free      [employer] - [employer] [facility]  Phone: (661) [phone]  Website: www.Rent the Runway  Language: English, Gambian  Hours: Mon 9:00 AM - 3:30 PM Tue 9:00 AM - 6:30 PM Wed 9:00 AM - 3:30 PM Thu 9:00 AM - 12:30 PM Fri 9:00 AM - 12:30 PM Sat 9:00 AM - 12:00 PM  Fee: Free               IMPORTANT NUMBERS & WEBSITES        Emergency Services  911  .   [facility]  211 http://211unitedway.org  .   Poison Control  (109) 717-1503 http://[place]poison.org http://wis[place]poison.org  .     Suicide and Crisis Lifeline  988 http://988lifeline.org  .   Childhelp National Child Abuse Hotline  703.675.3404 http://Childhelphotline.org   .   National Sexual Assault Hotline  (852) 222-4764 (HOPE) http://Rainn.org   .     National Runaway Safeline  (170) 627-6791 (RUNAWAY) http://1800runaway.org  .   Pregnancy & Postpartum Support  Call/text 466-424-5518  [place]: http://ppsupportmn.org  [place]: http://CMP.LY.com/wi  .   Substance Abuse National Helpline ([facility])  792-525-HELP (7348) http://Findtreatment.gov   .                DISCLAIMER: These resources have been generated via the [employer] Platform. [employer] does not endorse any service providers mentioned in this resource list. [employer] does not guarantee that the services mentioned in this resource list will be available to you or will  improve your health or wellness.    [facility]

## 2024-06-19 ENCOUNTER — OFFICE VISIT (OUTPATIENT)
Dept: FAMILY MEDICINE | Facility: CLINIC | Age: 34
End: 2024-06-19
Payer: COMMERCIAL

## 2024-06-19 VITALS
WEIGHT: 151 LBS | DIASTOLIC BLOOD PRESSURE: 69 MMHG | OXYGEN SATURATION: 99 % | TEMPERATURE: 97.9 F | BODY MASS INDEX: 25.16 KG/M2 | RESPIRATION RATE: 18 BRPM | HEART RATE: 79 BPM | HEIGHT: 65 IN | SYSTOLIC BLOOD PRESSURE: 104 MMHG

## 2024-06-19 DIAGNOSIS — Z11.3 SCREENING EXAMINATION FOR VENEREAL DISEASE: ICD-10-CM

## 2024-06-19 DIAGNOSIS — Z13.1 SCREENING FOR DIABETES MELLITUS: ICD-10-CM

## 2024-06-19 DIAGNOSIS — Z00.00 ROUTINE GENERAL MEDICAL EXAMINATION AT A HEALTH CARE FACILITY: Primary | ICD-10-CM

## 2024-06-19 DIAGNOSIS — Z13.29 SCREENING FOR THYROID DISORDER: ICD-10-CM

## 2024-06-19 DIAGNOSIS — Z13.220 SCREENING FOR LIPID DISORDERS: ICD-10-CM

## 2024-06-19 PROBLEM — F32.1 MODERATE SINGLE CURRENT EPISODE OF MAJOR DEPRESSIVE DISORDER (H): Status: RESOLVED | Noted: 2018-07-25 | Resolved: 2024-06-19

## 2024-06-19 LAB
ALBUMIN SERPL BCG-MCNC: 4.5 G/DL (ref 3.5–5.2)
ALP SERPL-CCNC: 57 U/L (ref 40–150)
ALT SERPL W P-5'-P-CCNC: 19 U/L (ref 0–50)
ANION GAP SERPL CALCULATED.3IONS-SCNC: 9 MMOL/L (ref 7–15)
AST SERPL W P-5'-P-CCNC: 38 U/L (ref 0–45)
BILIRUB SERPL-MCNC: 0.5 MG/DL
BUN SERPL-MCNC: 11.5 MG/DL (ref 6–20)
CALCIUM SERPL-MCNC: 9.8 MG/DL (ref 8.6–10)
CHLORIDE SERPL-SCNC: 104 MMOL/L (ref 98–107)
CHOLEST SERPL-MCNC: 172 MG/DL
CREAT SERPL-MCNC: 0.82 MG/DL (ref 0.51–0.95)
DEPRECATED HCO3 PLAS-SCNC: 25 MMOL/L (ref 22–29)
EGFRCR SERPLBLD CKD-EPI 2021: >90 ML/MIN/1.73M2
ERYTHROCYTE [DISTWIDTH] IN BLOOD BY AUTOMATED COUNT: 12.4 % (ref 10–15)
FASTING STATUS PATIENT QL REPORTED: YES
FASTING STATUS PATIENT QL REPORTED: YES
GLUCOSE SERPL-MCNC: 77 MG/DL (ref 70–99)
HBA1C MFR BLD: 5 % (ref 0–5.6)
HCT VFR BLD AUTO: 41 % (ref 35–47)
HDLC SERPL-MCNC: 66 MG/DL
HGB BLD-MCNC: 13.2 G/DL (ref 11.7–15.7)
HIV 1+2 AB+HIV1 P24 AG SERPL QL IA: NONREACTIVE
LDLC SERPL CALC-MCNC: 93 MG/DL
MCH RBC QN AUTO: 28.8 PG (ref 26.5–33)
MCHC RBC AUTO-ENTMCNC: 32.2 G/DL (ref 31.5–36.5)
MCV RBC AUTO: 90 FL (ref 78–100)
NONHDLC SERPL-MCNC: 106 MG/DL
PLATELET # BLD AUTO: 251 10E3/UL (ref 150–450)
POTASSIUM SERPL-SCNC: 4 MMOL/L (ref 3.4–5.3)
PROT SERPL-MCNC: 8.1 G/DL (ref 6.4–8.3)
RBC # BLD AUTO: 4.58 10E6/UL (ref 3.8–5.2)
SODIUM SERPL-SCNC: 138 MMOL/L (ref 135–145)
T PALLIDUM AB SER QL: NONREACTIVE
TRIGL SERPL-MCNC: 67 MG/DL
TSH SERPL DL<=0.005 MIU/L-ACNC: 0.59 UIU/ML (ref 0.3–4.2)
WBC # BLD AUTO: 4 10E3/UL (ref 4–11)

## 2024-06-19 PROCEDURE — 80053 COMPREHEN METABOLIC PANEL: CPT | Performed by: STUDENT IN AN ORGANIZED HEALTH CARE EDUCATION/TRAINING PROGRAM

## 2024-06-19 PROCEDURE — 36415 COLL VENOUS BLD VENIPUNCTURE: CPT | Performed by: STUDENT IN AN ORGANIZED HEALTH CARE EDUCATION/TRAINING PROGRAM

## 2024-06-19 PROCEDURE — 99395 PREV VISIT EST AGE 18-39: CPT | Performed by: STUDENT IN AN ORGANIZED HEALTH CARE EDUCATION/TRAINING PROGRAM

## 2024-06-19 PROCEDURE — 86780 TREPONEMA PALLIDUM: CPT | Performed by: STUDENT IN AN ORGANIZED HEALTH CARE EDUCATION/TRAINING PROGRAM

## 2024-06-19 PROCEDURE — 83036 HEMOGLOBIN GLYCOSYLATED A1C: CPT | Performed by: STUDENT IN AN ORGANIZED HEALTH CARE EDUCATION/TRAINING PROGRAM

## 2024-06-19 PROCEDURE — 87491 CHLMYD TRACH DNA AMP PROBE: CPT | Performed by: STUDENT IN AN ORGANIZED HEALTH CARE EDUCATION/TRAINING PROGRAM

## 2024-06-19 PROCEDURE — 80061 LIPID PANEL: CPT | Performed by: STUDENT IN AN ORGANIZED HEALTH CARE EDUCATION/TRAINING PROGRAM

## 2024-06-19 PROCEDURE — 85027 COMPLETE CBC AUTOMATED: CPT | Performed by: STUDENT IN AN ORGANIZED HEALTH CARE EDUCATION/TRAINING PROGRAM

## 2024-06-19 PROCEDURE — 84443 ASSAY THYROID STIM HORMONE: CPT | Performed by: STUDENT IN AN ORGANIZED HEALTH CARE EDUCATION/TRAINING PROGRAM

## 2024-06-19 PROCEDURE — 87591 N.GONORRHOEAE DNA AMP PROB: CPT | Performed by: STUDENT IN AN ORGANIZED HEALTH CARE EDUCATION/TRAINING PROGRAM

## 2024-06-19 PROCEDURE — 87389 HIV-1 AG W/HIV-1&-2 AB AG IA: CPT | Performed by: STUDENT IN AN ORGANIZED HEALTH CARE EDUCATION/TRAINING PROGRAM

## 2024-06-19 ASSESSMENT — PAIN SCALES - GENERAL: PAINLEVEL: NO PAIN (0)

## 2024-06-19 ASSESSMENT — PATIENT HEALTH QUESTIONNAIRE - PHQ9
SUM OF ALL RESPONSES TO PHQ QUESTIONS 1-9: 3
SUM OF ALL RESPONSES TO PHQ QUESTIONS 1-9: 3
10. IF YOU CHECKED OFF ANY PROBLEMS, HOW DIFFICULT HAVE THESE PROBLEMS MADE IT FOR YOU TO DO YOUR WORK, TAKE CARE OF THINGS AT HOME, OR GET ALONG WITH OTHER PEOPLE: NOT DIFFICULT AT ALL

## 2024-06-19 NOTE — PROGRESS NOTES
"Preventive Care Visit  [facility]  Shiv Chew DO, Family Medicine  Jun 19, 2024      Assessment & Plan     Routine general medical examination at a health care facility  -Vitals: WNL  -Pap: due 2028  -Contraception: discussed contraception; pt considering phexxi gel vs estrogen vaginal ring (will let me know on mychart)   -considering getting pregnant in the next year   -advised to consider pre-conception counseling with ob/gyn  -Labs: cbc, cmp, A1c, tsh    - TSH with free T4 reflex  - Comprehensive metabolic panel  - CBC with platelets    Screening for lipid disorders  - Lipid panel    Screening for diabetes mellitus  - Hemoglobin A1c    Screening for thyroid disorder  - TSH with free T4 reflex    Screening examination for venereal disease  - HIV Antigen Antibody Combo Cascade  - Treponema Abs w Reflex to RPR and Titer  - First-voided urine - Chlamydia trachomatis/Neisseria gonorrhoeae by PCR      BMI  Estimated body mass index is 25.13 kg/m  as calculated from the following:    Height as of this encounter: 1.651 m (5' 5\").    Weight as of this encounter: 68.5 kg (151 lb).       Counseling  Appropriate preventive services were discussed with this patient, including applicable screening as appropriate for fall prevention, nutrition, physical activity, Tobacco-use cessation, weight loss and cognition.  Checklist reviewing preventive services available has been given to the patient.  Reviewed patient's diet, addressing concerns and/or questions.   She is at risk for psychosocial distress and has been provided with information to reduce risk.         Vilma Mccoy is a 34 year old, presenting for the following:  Physical        6/19/2024     2:10 PM   Additional Questions   Roomed by Dillon MINAYA   Accompanied by jodie        Health Care Directive  Patient does not have a Health Care Directive or Living Will: Discussed advance care planning with patient; information given to " patient to review.    HPI    Work-cardiac tech in school  Contraception-questions about options  Pap/hpv-due 2028 6/16/2024   General Health   How would you rate your overall physical health? Excellent   Feel stress (tense, anxious, or unable to sleep) Only a little      (!) STRESS CONCERN      6/16/2024   Nutrition   Three or more servings of calcium each day? (!) I DON'T KNOW   Diet: Regular (no restrictions)   How many servings of fruit and vegetables per day? (!) I DON'T KNOW   How many sweetened beverages each day? 0-1            6/16/2024   Exercise   Days per week of moderate/strenous exercise 4 days   Average minutes spent exercising at this level 30 min            6/16/2024   Social Factors   Frequency of gathering with friends or relatives Once a week   Worry food won't last until get money to buy more Yes   Food not last or not have enough money for food? Yes   Do you have housing? (Housing is defined as stable permanent housing and does not include staying ouside in a car, in a tent, in an abandoned building, in an overnight shelter, or couch-surfing.) Yes   Are you worried about losing your housing? No   Lack of transportation? No   Unable to get utilities (heat,electricity)? No      (!) FOOD SECURITY CONCERN PRESENT      6/16/2024   Dental   Dentist two times every year? Yes        Today's PHQ-9 Score:       6/19/2024     2:04 PM   PHQ-9 SCORE   PHQ-9 Total Score MyChart 3 (Minimal depression)   PHQ-9 Total Score 3         6/16/2024   Substance Use   Alcohol more than 3/day or more than 7/wk No   Do you use any other substances recreationally? No        Social History     Tobacco Use    Smoking status: Never    Smokeless tobacco: Never   Vaping Use    Vaping status: Never Used   Substance Use Topics    Alcohol use: No    Drug use: No         6/16/2024   STI Screening   New sexual partner(s) since last STI/HIV test? (!) YES         History of abnormal Pap smear: No - age 30-64 HPV with reflex Pap  "every 5 years recommended        Latest Ref Rng & Units 4/13/2023     9:10 AM 10/11/2017     3:51 PM   PAP / HPV   PAP  Negative for Intraepithelial Lesion or Malignancy (NILM)  Negative for squamous intraepithelial lesion or malignancy  Electronically signed by Mat Sheth CT (ASCP) on 10/18/2017 at  1:22 PM      HPV 16 DNA Negative Negative     HPV 18 DNA Negative Negative     Other HR HPV Negative Negative             6/16/2024   Contraception/Family Planning   Questions about contraception or family planning (!) YES           Reviewed and updated as needed this visit by Provider   Tobacco   Meds   Med Hx  Surg Hx  Fam Hx             Objective    Exam  /69 (BP Location: Right arm, Patient Position: Sitting, Cuff Size: Adult Regular)   Pulse 79   Temp 97.9  F (36.6  C) (Temporal)   Resp 18   Ht 1.651 m (5' 5\")   Wt 68.5 kg (151 lb)   LMP 06/13/2024 (Exact Date)   SpO2 99%   BMI 25.13 kg/m     Estimated body mass index is 25.13 kg/m  as calculated from the following:    Height as of this encounter: 1.651 m (5' 5\").    Weight as of this encounter: 68.5 kg (151 lb).      Physical Exam  Constitutional:       General: She is not in acute distress.     Appearance: She is well-developed.   HENT:      Head: Normocephalic and atraumatic.      Right Ear: Tympanic membrane, ear canal and external ear normal.      Left Ear: Tympanic membrane, ear canal and external ear normal.      Nose: Nose normal.      Mouth/Throat:      Mouth: Mucous membranes are moist.      Pharynx: No oropharyngeal exudate.   Eyes:      Extraocular Movements: Extraocular movements intact.      Conjunctiva/sclera: Conjunctivae normal.      Pupils: Pupils are equal, round, and reactive to light.   Cardiovascular:      Rate and Rhythm: Normal rate and regular rhythm.      Heart sounds: Normal heart sounds. No murmur heard.  Pulmonary:      Effort: Pulmonary effort is normal.      Breath sounds: No wheezing or rales.   Chest: "   Breasts:     Right: Normal.      Left: Normal.   Abdominal:      General: Bowel sounds are normal.      Palpations: Abdomen is soft.      Tenderness: There is no abdominal tenderness.   Musculoskeletal:      Cervical back: Normal range of motion and neck supple. No rigidity.   Lymphadenopathy:      Cervical: No cervical adenopathy.      Upper Body:      Right upper body: No supraclavicular, axillary or pectoral adenopathy.      Left upper body: No supraclavicular, axillary or pectoral adenopathy.   Skin:     General: Skin is warm and dry.      Findings: No rash.   Neurological:      General: No focal deficit present.      Mental Status: She is alert and oriented to person, place, and time.   Psychiatric:         Mood and Affect: Mood normal.       Signed Electronically by: Shiv Chew DO    Answers submitted by the patient for this visit:  Patient Health Questionnaire (Submitted on 6/19/2024)  If you checked off any problems, how difficult have these problems made it for you to do your work, take care of things at home, or get along with other people?: Not difficult at all  PHQ9 TOTAL SCORE: 3

## 2024-06-20 LAB
C TRACH DNA SPEC QL PROBE+SIG AMP: NEGATIVE
N GONORRHOEA DNA SPEC QL NAA+PROBE: NEGATIVE

## 2024-08-29 ENCOUNTER — VIRTUAL VISIT (OUTPATIENT)
Dept: OBGYN | Facility: CLINIC | Age: 34
End: 2024-08-29
Payer: COMMERCIAL

## 2024-08-29 DIAGNOSIS — Z30.09 ENCOUNTER FOR GENERAL COUNSELING AND ADVICE ON CONTRACEPTIVE MANAGEMENT: Primary | ICD-10-CM

## 2024-08-29 DIAGNOSIS — Z30.015 ENCOUNTER FOR INITIAL PRESCRIPTION OF VAGINAL RING HORMONAL CONTRACEPTIVE: ICD-10-CM

## 2024-08-29 PROCEDURE — 99213 OFFICE O/P EST LOW 20 MIN: CPT | Mod: 95 | Performed by: OBSTETRICS & GYNECOLOGY

## 2024-08-29 RX ORDER — ETONOGESTREL AND ETHINYL ESTRADIOL VAGINAL RING .015; .12 MG/D; MG/D
RING VAGINAL
Qty: 3 EACH | Refills: 4 | Status: SHIPPED | OUTPATIENT
Start: 2024-08-29

## 2024-08-29 NOTE — PROGRESS NOTES
Nadine is a 34 year old who is being evaluated via a billable video visit.    How would you like to obtain your AVS? MyChart  If the video visit is dropped, the invitation should be resent by: Send to e-mail at: [email or web address]  Will anyone else be joining your video visit? No      1. Encounter for general counseling and advice on contraceptive management  - Discussed IUD options briefly, as she stated she was not interested in IUD.    - Discussed OCPs, Patch, NuvaRing. Discussed side effects, efficacy, delivery system. Discussed missed doses.   - Discussed Nexplanon: mechanism of action, duration, bleeding profile, side effects, efficacy.  - Discussed barrier methods: condoms, cervical cap, diaphragm.   - At conclusion of consultation patient feels she will proceed with NuvaRing.       2. Encounter for initial prescription of vaginal ring hormonal contraceptive  Recommend starting on first day of next cycle, which should be within next week.  Discussed both 3w on, 1w off scheduling, as well as 4w continuous  - etonogestrel-ethinyl estradiol (NUVARING) 0.12-0.015 MG/24HR vaginal ring; Insert one (1) ring vaginally and leave in place for 3 consecutive weeks (21 days), then remove for 1 week.  Dispense: 3 each; Refill: 4    Orly Walker MD      Subjective   Nadine is a 34 year old, presenting for the following health issues:  No chief complaint on file.          HPI   LMP 21 days ago.  Getting  in 2 months and would like to start contraception in advance.  Interested in NuvaRing, but would like to hear about all options.  Interested in lower hormones  Not interested in IUD    No htn, hs VTE, migraines with aura.  Non-smoker          Objective           Vitals:  No vitals were obtained today due to virtual visit.    Physical Exam   GENERAL: alert and no distress  EYES: Eyes grossly normal to inspection.  No discharge or erythema, or obvious scleral/conjunctival abnormalities.  RESP: No audible  wheeze, cough, or visible cyanosis.    SKIN: Visible skin clear. No significant rash, abnormal pigmentation or lesions.  NEURO: Cranial nerves grossly intact.  Mentation and speech appropriate for age.  PSYCH: Appropriate affect, tone, and pace of words          Video-Visit Details    Type of service:  Video Visit   Originating Location (pt. Location): Home    Distant Location (provider location):  On-site  Platform used for Video Visit: Elda  Duration:  15:20  Signed Electronically by: Orly Walker MD

## 2024-11-01 ENCOUNTER — OFFICE VISIT (OUTPATIENT)
Dept: URGENT CARE | Facility: URGENT CARE | Age: 34
End: 2024-11-01
Payer: COMMERCIAL

## 2024-11-01 VITALS
BODY MASS INDEX: 24.99 KG/M2 | TEMPERATURE: 98.5 F | DIASTOLIC BLOOD PRESSURE: 74 MMHG | OXYGEN SATURATION: 99 % | HEIGHT: 65 IN | RESPIRATION RATE: 16 BRPM | WEIGHT: 150 LBS | HEART RATE: 70 BPM | SYSTOLIC BLOOD PRESSURE: 109 MMHG

## 2024-11-01 DIAGNOSIS — N30.00 ACUTE CYSTITIS WITHOUT HEMATURIA: Primary | ICD-10-CM

## 2024-11-01 DIAGNOSIS — R10.2 PELVIC PAIN IN FEMALE: ICD-10-CM

## 2024-11-01 DIAGNOSIS — R11.0 NAUSEA: ICD-10-CM

## 2024-11-01 DIAGNOSIS — R10.84 ABDOMINAL PAIN, GENERALIZED: ICD-10-CM

## 2024-11-01 DIAGNOSIS — R31.9 HEMATURIA, UNSPECIFIED TYPE: ICD-10-CM

## 2024-11-01 LAB
ALBUMIN UR-MCNC: 30 MG/DL
APPEARANCE UR: CLEAR
BACTERIA #/AREA URNS HPF: ABNORMAL /HPF
BASOPHILS # BLD AUTO: 0 10E3/UL (ref 0–0.2)
BASOPHILS NFR BLD AUTO: 0 %
BILIRUB UR QL STRIP: ABNORMAL
CLUE CELLS: ABNORMAL
COLOR UR AUTO: YELLOW
EOSINOPHIL # BLD AUTO: 0 10E3/UL (ref 0–0.7)
EOSINOPHIL NFR BLD AUTO: 0 %
ERYTHROCYTE [DISTWIDTH] IN BLOOD BY AUTOMATED COUNT: 12.2 % (ref 10–15)
ERYTHROCYTE [SEDIMENTATION RATE] IN BLOOD BY WESTERGREN METHOD: 19 MM/HR (ref 0–20)
GLUCOSE UR STRIP-MCNC: NEGATIVE MG/DL
HCG UR QL: NEGATIVE
HCT VFR BLD AUTO: 37 % (ref 35–47)
HGB BLD-MCNC: 12.1 G/DL (ref 11.7–15.7)
HGB UR QL STRIP: ABNORMAL
IMM GRANULOCYTES # BLD: 0 10E3/UL
IMM GRANULOCYTES NFR BLD: 0 %
KETONES UR STRIP-MCNC: ABNORMAL MG/DL
LEUKOCYTE ESTERASE UR QL STRIP: ABNORMAL
LYMPHOCYTES # BLD AUTO: 1.4 10E3/UL (ref 0.8–5.3)
LYMPHOCYTES NFR BLD AUTO: 14 %
MCH RBC QN AUTO: 29.2 PG (ref 26.5–33)
MCHC RBC AUTO-ENTMCNC: 32.7 G/DL (ref 31.5–36.5)
MCV RBC AUTO: 89 FL (ref 78–100)
MONOCYTES # BLD AUTO: 0.5 10E3/UL (ref 0–1.3)
MONOCYTES NFR BLD AUTO: 5 %
NEUTROPHILS # BLD AUTO: 7.9 10E3/UL (ref 1.6–8.3)
NEUTROPHILS NFR BLD AUTO: 81 %
NITRATE UR QL: NEGATIVE
PH UR STRIP: 6 [PH] (ref 5–7)
PLATELET # BLD AUTO: 221 10E3/UL (ref 150–450)
RBC # BLD AUTO: 4.14 10E6/UL (ref 3.8–5.2)
RBC #/AREA URNS AUTO: ABNORMAL /HPF
SP GR UR STRIP: >=1.03 (ref 1–1.03)
SQUAMOUS #/AREA URNS AUTO: ABNORMAL /LPF
TRICHOMONAS, WET PREP: ABNORMAL
UROBILINOGEN UR STRIP-ACNC: 1 E.U./DL
WBC # BLD AUTO: 9.8 10E3/UL (ref 4–11)
WBC #/AREA URNS AUTO: ABNORMAL /HPF
WBC'S/HIGH POWER FIELD, WET PREP: ABNORMAL
YEAST, WET PREP: ABNORMAL

## 2024-11-01 PROCEDURE — 85025 COMPLETE CBC W/AUTO DIFF WBC: CPT | Performed by: STUDENT IN AN ORGANIZED HEALTH CARE EDUCATION/TRAINING PROGRAM

## 2024-11-01 PROCEDURE — 81001 URINALYSIS AUTO W/SCOPE: CPT | Performed by: PHYSICIAN ASSISTANT

## 2024-11-01 PROCEDURE — 87210 SMEAR WET MOUNT SALINE/INK: CPT | Performed by: PHYSICIAN ASSISTANT

## 2024-11-01 PROCEDURE — 81025 URINE PREGNANCY TEST: CPT | Performed by: PHYSICIAN ASSISTANT

## 2024-11-01 PROCEDURE — 36415 COLL VENOUS BLD VENIPUNCTURE: CPT | Performed by: STUDENT IN AN ORGANIZED HEALTH CARE EDUCATION/TRAINING PROGRAM

## 2024-11-01 PROCEDURE — 85652 RBC SED RATE AUTOMATED: CPT | Performed by: STUDENT IN AN ORGANIZED HEALTH CARE EDUCATION/TRAINING PROGRAM

## 2024-11-01 PROCEDURE — 87086 URINE CULTURE/COLONY COUNT: CPT | Performed by: STUDENT IN AN ORGANIZED HEALTH CARE EDUCATION/TRAINING PROGRAM

## 2024-11-01 PROCEDURE — 87088 URINE BACTERIA CULTURE: CPT | Performed by: STUDENT IN AN ORGANIZED HEALTH CARE EDUCATION/TRAINING PROGRAM

## 2024-11-01 PROCEDURE — 99214 OFFICE O/P EST MOD 30 MIN: CPT | Performed by: STUDENT IN AN ORGANIZED HEALTH CARE EDUCATION/TRAINING PROGRAM

## 2024-11-01 RX ORDER — NITROFURANTOIN 25; 75 MG/1; MG/1
100 CAPSULE ORAL 2 TIMES DAILY
Qty: 10 CAPSULE | Refills: 0 | Status: SHIPPED | OUTPATIENT
Start: 2024-11-01

## 2024-11-01 RX ORDER — NITROFURANTOIN 25; 75 MG/1; MG/1
100 CAPSULE ORAL 2 TIMES DAILY
Qty: 10 CAPSULE | Refills: 0 | Status: SHIPPED | OUTPATIENT
Start: 2024-11-01 | End: 2024-11-01

## 2024-11-01 ASSESSMENT — PAIN SCALES - GENERAL: PAINLEVEL_OUTOF10: MODERATE PAIN (5)

## 2024-11-01 NOTE — PATIENT INSTRUCTIONS
If symptoms do not improve in the next 24-48 hours or her abdominal pain increases significantly, she should go to the Emergency Department.     Return to urgent care if you develop a fever, chills, lower back pain, vomiting.    Complete the full course of antibiotics even if you start to feel better    The urine culture is pending.  Someone will call you if it grows a bacteria that requires a different antibiotic    Take Tylenol, ibuprofen every 6 hours as needed for pain    Stay hydrated.  You can try AZO 3 times a day for bladder pain
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
## 2024-11-01 NOTE — LETTER
2024    Nadine Kilgore   1990        To Whom it May Concern;    Nadine SUSAN Kilgore was in the Urgent Care for an acute illness on .    Sincerely,        Orly Gonzalez DO

## 2024-11-02 LAB
BACTERIA UR CULT: ABNORMAL
BACTERIA UR CULT: ABNORMAL

## 2024-11-02 NOTE — RESULT ENCOUNTER NOTE
Please call patient and find out how she is doing .  If symptoms are not improving as urine culture did grow strep would consider switching to Keflex    Pamela Llanos MD

## 2024-11-05 NOTE — PROGRESS NOTES
Dr. Llanos - patient states sx improved some but have now worsened again. May warrant abx change, appreciate advisement.    LENKA Murphy, JACOBN, RN (she/her)  [facility] RN

## 2024-11-06 RX ORDER — CEPHALEXIN 500 MG/1
500 CAPSULE ORAL 2 TIMES DAILY
Qty: 10 CAPSULE | Refills: 0 | Status: SHIPPED | OUTPATIENT
Start: 2024-11-06 | End: 2024-11-11

## 2024-12-10 NOTE — PATIENT INSTRUCTIONS
Patient Education     https://www.[facility].org/learn/birth-control      Let me know if you decide on a method of contraception.      Dr. Chew      Thank you for coming to see me today! Here are a couple of pieces of information about my schedule and communication practices.     I am not in the clinic on Tuesdays. Non-urgent calls and messages received on Tuesdays will be addressed as soon as I am able when I am back in the office on the next business day. Urgent calls will be addressed by a covering clinician.       If lab work was done today as part of your evaluation you will generally be contacted via [facility], mail, or phone with the results within 7-10 days.  If there is an alarming/concerning result we will contact you by phone. Lab results come back at varying times, I generally wait until all labs are resulted before making comments on results. Please note, labs are automatically released to [facility] once available, but it may take a couple of days for my interpretation note to appear.      I try very hard to respond to medical messages with 2 business days of receiving them. Occasionally it takes me longer if I am trying to figure out the best way to respond and need to seek guidance, do some research or dig deeper into your medical history to come up with a helpful response.      If you need refills please contact your pharmacist. They will send a refill request to me to review. Please allow 3-5 business days for us to respond to all refill requests.      Please call or send a medical message with any questions or concerns. Thank you for trusting me to be part of your healthcare team!        Dr. Shiv Chew      Preventive Care Advice   This is general advice we often give to help people stay healthy. Your care team may have specific advice just for you. Please talk to your care team about your own preventive care needs.  Lifestyle  Exercise at least 150 minutes each week (30 minutes a day, 5  "days a week).  Do muscle strengthening activities 2 days a week. These help control your weight and prevent disease.  No smoking.  Wear sunscreen to prevent skin cancer.  Have your home tested for radon every 2 to 5 years. Radon is a colorless, odorless gas that can harm your lungs. To learn more, go to www.health.[facility].mn. and search for \"Radon in Homes.\"  Keep guns unloaded and locked up in a safe place like a safe or gun vault, or, use a gun lock and hide the keys. Always lock away bullets separately. To learn more, visit [employer]mn.gov and search for \"safe gun storage.\"  Nutrition  Eat 5 or more servings of fruits and vegetables each day.  Try wheat bread, brown rice and whole grain pasta (instead of white bread, rice, and pasta).  Get enough calcium and vitamin D. Check the label on foods and aim for 100% of the RDA (recommended daily allowance).  Regular exams  Have a dental exam and cleaning every 6 months.  See your health care team every year to talk about:  Any changes in your health.  Any medicines your care team has prescribed.  Preventive care, family planning, and ways to prevent chronic diseases.  Shots (vaccines)   HPV shots (up to age 26), if you've never had them before.  Hepatitis B shots (up to age 59), if you've never had them before.  COVID-19 shot: Get this shot when it's due.  Flu shot: Get a flu shot every year.  Tetanus shot: Get a tetanus shot every 10 years.  Pneumococcal, hepatitis A, and RSV shots: Ask your care team if you need these based on your risk.  Shingles shot (for age 50 and up).  General health tests  Diabetes screening:  Starting at age 35, Get screened for diabetes at least every 3 years.  If you are younger than age 35, ask your care team if you should be screened for diabetes.  Cholesterol test: At age 39, start having a cholesterol test every 5 years, or more often if advised.  Bone density scan (DEXA): At age 50, ask your care team if you should have this scan for " osteoporosis (brittle bones).  Hepatitis C: Get tested at least once in your life.  Abdominal aortic aneurysm screening: Talk to your doctor about having this screening if you:  Have ever smoked; and  Are biologically male; and  Are between the ages of 65 and 75.  STIs (sexually transmitted infections)  Before age 24: Ask your care team if you should be screened for STIs.  After age 24: Get screened for STIs if you're at risk. You are at risk for STIs (including HIV) if:  You are sexually active with more than one person.  You don't use condoms every time.  You or a partner was diagnosed with a sexually transmitted infection.  If you are at risk for HIV, ask about PrEP medicine to prevent HIV.  Get tested for HIV at least once in your life, whether you are at risk for HIV or not.  Cancer screening tests  Cervical cancer screening: If you have a cervix, begin getting regular cervical cancer screening tests at age 21. Most people who have regular screenings with normal results can stop after age 65. Talk about this with your provider.  Breast cancer scan (mammogram): If you've ever had breasts, begin having regular mammograms starting at age 40. This is a scan to check for breast cancer.  Colon cancer screening: It is important to start screening for colon cancer at age 45.  Have a colonoscopy test every 10 years (or more often if you're at risk) Or, ask your provider about stool tests like a FIT test every year or Cologuard test every 3 years.  To learn more about your testing options, visit: www.[employer]/560761.pdf.  For help making a decision, visit: roger/ko63245.  Prostate cancer screening test: If you have a prostate and are age 55 to 69, ask your provider if you would benefit from a yearly prostate cancer screening test.  Lung cancer screening: If you are a current or former smoker age 50 to 80, ask your care team if ongoing lung cancer screenings are right for you.  For informational purposes only. Not to  replace the advice of your health care provider. Copyright   2023 [facility]. All rights reserved. Clinically reviewed by the [facility] Transitions Program. [employer]dem[employer] | [employer] [id number] - REV 04/24.      98.2

## 2025-01-07 ENCOUNTER — TELEPHONE (OUTPATIENT)
Dept: MIDWIFE SERVICES | Facility: CLINIC | Age: 35
End: 2025-01-07

## 2025-01-07 NOTE — TELEPHONE ENCOUNTER
[facility]    Phone Message    May a detailed message be left on voicemail: yes     Reason for Call: Other: . Pt is currently 6w6d, writer scheduled all initial prenatal visits, pt is wondering what she can take to help with nausea, or any other precautionary measures she should take before her scheduled appts, please call Nadine, thank you!    Action Taken: Message routed to:  Other: obgyn    Travel Screening: Not Applicable     Date of Service:

## 2025-01-22 ENCOUNTER — TELEPHONE (OUTPATIENT)
Dept: MIDWIFE SERVICES | Facility: CLINIC | Age: 35
End: 2025-01-22

## 2025-01-22 ENCOUNTER — VIRTUAL VISIT (OUTPATIENT)
Dept: OBGYN | Facility: CLINIC | Age: 35
End: 2025-01-22
Attending: ADVANCED PRACTICE MIDWIFE

## 2025-01-22 VITALS — BODY MASS INDEX: 24.96 KG/M2 | HEIGHT: 65 IN

## 2025-01-22 DIAGNOSIS — O09.529 ENCOUNTER FOR SUPERVISION OF MULTIGRAVIDA WITH ADVANCED MATERNAL AGE: Primary | ICD-10-CM

## 2025-01-22 DIAGNOSIS — Z23 NEED FOR DIPHTHERIA-TETANUS-PERTUSSIS (TDAP) VACCINE: ICD-10-CM

## 2025-01-22 DIAGNOSIS — O21.9 NAUSEA AND VOMITING IN PREGNANCY: Primary | ICD-10-CM

## 2025-01-22 NOTE — PROGRESS NOTES
Important Information for Provider:     New ob nurse intake by phone, second pregnancy, AMA Recent 3/20/2018( [facility])  Handouts reviewed. Declined genetic screening. Ultrasound and NOB 2/03/2024 with CNM.  Patient requesting B6, Unisom for nausea to the updated pharmacy. TE sent to CNM    Caffeine intake/servings daily - 0  Calcium intake/servings daily - 3  Exercise 5 times weekly - describe ; walks 1 hour daily, precautions given  Sunscreen used - Yes  Seatbelts used - Yes  Self Breast Exam - Yes  Pap test up to date -  Yes  Dental exam up to date -  Yes  Immunizations reviewed and up to date - Yes  Abuse: Current or Past (Physical, Sexual or Emotional) - No  Do you feel safe in your environment - Yes  Do you cope well with stress - Yes      Prenatal OB Questionnaire  Patient supplied answers from flow sheet for:  Prenatal OB Questionnaire.  Past Medical History  Have you ever recieved care for your mental health? : (!) (Patient-Rptd) Yes  Have you ever been in a major accident or suffered serious trauma?: (Patient-Rptd) No  Within the last year, has anyone hit, slapped, kicked or otherwise hurt you?: (Patient-Rptd) No  In the last year, has anyone forced you to have sex when you didn't want to?: (Patient-Rptd) No    Past Medical History 2   Have you ever received a blood transfusion?: (Patient-Rptd) No  Would you accept a blood transfusion if was medically recommended?: (Patient-Rptd) Yes  Does anyone in your home smoke?: (Patient-Rptd) No   Is your blood type Rh negative?: (Patient-Rptd) No  Have you ever ?: (!) (Patient-Rptd) Yes  Have you been hospitalized for a nonsurgical reason excluding normal delivery?: (!) (Patient-Rptd) Yes  Have you ever had an abnormal pap smear?: (Patient-Rptd) No    Past Medical History (Continued)  Do you have a history of abnormalities of the uterus?: (Patient-Rptd) No  Did your mother take RYAN or any other hormones when she was pregnant with you?: (Patient-Rptd) No  Do  you have any other problems we have not asked about which you feel may be important to this pregnancy?: (Patient-Rptd) No                     Allergies as of 1/22/2025:    Allergies as of 01/22/2025    (No Known Allergies)       Current medications are:  Current Outpatient Medications   Medication Sig Dispense Refill    Prenatal Vit-DSS-Fe Cbn-FA (PRENATAL AD PO) Take by mouth.           Early ultrasound screening tool:    Does patient have irregular periods?  No  Did patient use hormonal birth control in the three months prior to positive urine pregnancy test? No  Is the patient breastfeeding?  No  Is the patient 10 weeks or greater at time of education visit?  No

## 2025-01-23 RX ORDER — PYRIDOXINE HCL (VITAMIN B6) 25 MG
25 TABLET ORAL 3 TIMES DAILY PRN
Qty: 90 TABLET | Refills: 2 | Status: SHIPPED | OUTPATIENT
Start: 2025-01-23

## 2025-01-31 NOTE — PATIENT INSTRUCTIONS
Weeks 10 to 14 of Your Pregnancy: Care Instructions  It's now possible to hear the fetus's heartbeat with a special ultrasound device. And the fetus's organs are developing.    Decide about tests to check for birth defects. Think about your age, your chance of passing on a family disease, your need to know about any problems, and what you might do after you have the test results.   It's okay to exercise. Try activities such as walking or swimming. Check with your doctor before starting a new program.     You may feel more tired than usual.  Taking naps during the day may help.     You may feel emotional.  It might help to talk to someone.     You may have headaches.  Try lying down and putting a cool cloth over your forehead.     You can use acetaminophen (Tylenol) for pain relief.  Don't take any anti-inflammatory medicines (such as Advil, Motrin, Aleve), unless your doctor says it's okay.     You may feel a fullness or aching in your lower belly.  This can feel like the kind of cramps you might get before a period. A back rub may help.     You may need to urinate more.  Your growing uterus and changing hormones can affect your bladder.     You may feel sick to your stomach (morning sickness).  Try avoiding food and smells that make you feel sick.     Your breasts may feel different.  They may feel tender or get bigger. Your nipples may get darker. Try a bra that gives you good support.     Avoid alcohol, tobacco, and drugs (including marijuana).  If you need help quitting, talk to your doctor.     Take a daily prenatal vitamin.  Choose one with folic acid.   Follow-up care is a key part of your treatment and safety. Be sure to make and go to all appointments, and call your doctor if you are having problems. It's also a good idea to know your test results and keep a list of the medicines you take.  Where can you learn more?  Go to https://www.healthwise.net/patiented  Enter E090 in the search box to learn more  "about \"Weeks 10 to 14 of Your Pregnancy: Care Instructions.\"  Current as of: April 30, 2024  Content Version: 14.3    2024 [employer].   Care instructions adapted under license by your healthcare professional. If you have questions about a medical condition or this instruction, always ask your healthcare professional. [employer] disclaims any warranty or liability for your use of this information.      Nutrition During Pregnancy: Care Instructions  Overview     Healthy eating when you are pregnant is important for you and your baby. It can help you feel well and have a successful pregnancy and delivery. During pregnancy your nutrition needs increase. Even if you have excellent eating habits, your doctor may recommend a multivitamin to make sure you get enough iron and folic acid.  You may wonder how much weight you should gain. In general, if you were at a healthy weight before you became pregnant, then you should gain between 25 and 35 pounds. If you were overweight before pregnancy, then you'll likely be advised to gain 15 to 25 pounds. If you were underweight before pregnancy, then you'll probably be advised to gain 28 to 40 pounds. Your doctor will work with you to set a weight goal that is right for you. Gaining a healthy amount of weight helps you have a healthy baby.  Follow-up care is a key part of your treatment and safety. Be sure to make and go to all appointments, and call your doctor if you are having problems. It's also a good idea to know your test results and keep a list of the medicines you take.  How can you care for yourself at home?  Eat plenty of fruits and vegetables. Include a variety of orange, yellow, and leafy dark-green vegetables every day.  Choose whole-grain bread, cereal, and pasta. Good choices include whole wheat bread, whole wheat pasta, brown rice, and oatmeal.  Get 4 or more servings of milk and milk products each day. Good choices include nonfat or low-fat " milk, yogurt, and cheese. If you cannot eat milk products, you can get calcium from calcium-fortified products such as orange juice, soy milk, and tofu. Other non-milk sources of calcium include leafy green vegetables, such as broccoli, kale, mustard greens, turnip greens, bok onel, and brussels sprouts.  If you eat meat, pick lower-fat types. Good choices include lean cuts of meat and chicken or turkey without the skin.  Avoid fish that are high in mercury. These include shark, swordfish, felipe mackerel, marlin, orange roughy, and bigeye tuna, as well as tilefish from the [facility].  It's okay to eat up to 8 to 12 ounces a week of fish that are low in mercury or up to 4 ounces a week of fish that have medium levels of mercury. Some fish that are low in mercury are salmon, shrimp, canned light tuna, cod, and tilapia. Some fish that have medium levels of mercury are halibut and white albacore tuna.  For more advice about eating fish, you can visit the U.S. Food and Drug Administration (FDA) or U.S. Environmental Protection Agency (EPA) website.  Heat lunch meats (such as turkey, ham, or bologna) to 165 F before you eat them. This reduces your risk of getting sick from a kind of bacteria that can be found in lunch meats.  Do not eat unpasteurized soft cheeses, such as brie, feta, fresh mozzarella, and blue cheese. They have a bacteria that could harm your baby.  Limit caffeine to about 200 to 300 mg per day. On average, a cup of brewed coffee has around 80 to 100 mg of caffeine.  Do not drink any alcohol. No amount of alcohol has been found to be safe during pregnancy.  Do not diet or try to lose weight. For example, do not follow a low-carbohydrate diet. If you are overweight at the start of your pregnancy, your doctor will work with you to manage your weight gain.  Tell your doctor about all vitamins and supplements you take.  When should you call for help?  Watch closely for changes in your health, and be sure  "to contact your doctor if you have any problems.  Where can you learn more?  Go to https://www.BuildingSearch.com.net/patiented  Enter Y785 in the search box to learn more about \"Nutrition During Pregnancy: Care Instructions.\"  Current as of: September 20, 2023  Content Version: 14.3    2024 [employer].   Care instructions adapted under license by your healthcare professional. If you have questions about a medical condition or this instruction, always ask your healthcare professional. [employer] disclaims any warranty or liability for your use of this information.    Exercise During Pregnancy: Care Instructions  Overview     Exercise is good for you during a healthy pregnancy. It can relieve back pain, swelling, and other discomforts. It also prepares your muscles for childbirth. And exercise can improve your energy level and help you sleep better.  If your doctor advises it, get more exercise. For example, walking is a good choice. Bit by bit, increase the amount you walk every day. Try for at least 30 minutes on most days of the week. You could also try a prenatal exercise class. But if you do not already exercise, be sure to talk with your doctor before you start a new exercise program. Doctors do not recommend contact sports during pregnancy.  Follow-up care is a key part of your treatment and safety. Be sure to make and go to all appointments, and call your doctor if you are having problems. It's also a good idea to know your test results and keep a list of the medicines you take.  How can you care for yourself at home?  Talk with your doctor about the right kind of exercise for each stage of pregnancy.  Listen to your body to know if your exercise is at a safe level.  Do not become overheated while you exercise. High body temperature can be harmful. Avoid activities that can make your body too hot.  If you feel tired, take it easy. You might walk instead of run.  If you are used to strenuous " "exercise, ask your doctor how to know when it's time to slow down.  If you exercised before getting pregnant, you should be able to keep up your routine early in your pregnancy. Later in your pregnancy, you may want to switch to more gentle activities.  Drink plenty of fluids before, during, and after exercise.  Avoid contact sports, such as soccer and basketball. Also avoid risky activities. These include scuba diving, horseback riding, and exercising at a high altitude (above 6,000 feet). If you live in a place with a high altitude, talk to your doctor about how you can exercise safely.  Do not get overtired while you exercise. You should be able to talk while you work out.  After your fourth month of pregnancy, avoid exercises that require you to lie flat on your back on a hard surface. These include sit-ups and some yoga poses.  Get plenty of rest. You may be very tired while you are pregnant.  Where can you learn more?  Go to https://www.MongoDB.net/patiented  Enter S801 in the search box to learn more about \"Exercise During Pregnancy: Care Instructions.\"  Current as of: April 30, 2024  Content Version: 14.3    2024 [employer].   Care instructions adapted under license by your healthcare professional. If you have questions about a medical condition or this instruction, always ask your healthcare professional. [employer] disclaims any warranty or liability for your use of this information.    Learning About Pregnancy When You Are Underweight or Overweight  How does your weight affect your pregnancy?    The basics of prenatal care are the same for everyone, regardless of size. You'll get what you need to have a healthy baby.  But if you are not at a weight that is healthy for you, it can make a difference in a few things. Being underweight or overweight can increase the chances of some problems during pregnancy. So your doctor or midwife will pay close attention to your health and your " baby's health. You may have some extra doctor or midwife visits and tests. And you may have some tests earlier in your pregnancy.  Work with your doctor or midwife to get the care you need. Go to all your doctor or midwife visits, and follow their advice about what to do and what to avoid during pregnancy.  How much weight gain is healthy during pregnancy?  There's no fixed number of pounds that you should be aiming for. Instead, there's a range of weight gain that's good for you and your baby. Based on your weight before pregnancy, experts say it's generally best to gain about:  28 lb (13 kg) to 40 lb (18 kg) if you're underweight.  25 lb (11 kg) to 35 lb (16 kg) if you're at a healthy weight.  15 lb (7 kg) to 25 lb (11 kg) if you're overweight.  11 lb (5 kg) to 20 lb (9 kg) if you're very overweight (obese). In some cases, a doctor may recommend that you don't gain any weight.  If you have questions about weight gain during pregnancy, talk with your doctor about what's right for you. Gaining a healthy amount of weight helps you have a healthy pregnancy.  How much extra food do you need to eat?  How much food you need to eat during pregnancy depends on:  Your height.  How much you weigh when you get pregnant.  How active you are.  If you're carrying more than one fetus (multiple pregnancy).  In the first trimester, you'll probably need the same amount of calories as you did before you were pregnant. In general, in your second trimester, you need to eat about 340 extra calories a day. In your third trimester, you need to eat about 450 extra calories a day.  What can you do to have a healthy pregnancy?  The best things you can do for you and your baby are to eat healthy foods, get regular exercise, avoid alcohol and smoking, and go to your doctor or midwife visits.  Eat a variety of foods from all the food groups. Make sure to get enough calcium and folic acid. Ask your doctor or midwife how much folic acid you  "should be taking.  You may want to work with a dietitian to help you plan healthy meals to get the right amount of calories for you.  Talk to your doctor or midwife about how you can exercise safely. If you didn't exercise much before you got pregnant, talk to your doctor or midwife about how you can slowly get more active. They may want to set up an exercise program with you.  Where can you learn more?  Go to https://www.Fittr.net/patiented  Enter B644 in the search box to learn more about \"Learning About Pregnancy When You Are Underweight or Overweight.\"  Current as of: April 30, 2024  Content Version: 14.3    2024 [employer].   Care instructions adapted under license by your healthcare professional. If you have questions about a medical condition or this instruction, always ask your healthcare professional. [employer] disclaims any warranty or liability for your use of this information.    You have been provided the CDC Warning Signs in Pregnancy document.    Additional copies can be found here: www.[email or web address].pdf  Weeks 10 to 14 of Your Pregnancy: Care Instructions  It's now possible to hear the fetus's heartbeat with a special ultrasound device. And the fetus's organs are developing.    Decide about tests to check for birth defects. Think about your age, your chance of passing on a family disease, your need to know about any problems, and what you might do after you have the test results.   It's okay to exercise. Try activities such as walking or swimming. Check with your doctor before starting a new program.     You may feel more tired than usual.  Taking naps during the day may help.     You may feel emotional.  It might help to talk to someone.     You may have headaches.  Try lying down and putting a cool cloth over your forehead.     You can use acetaminophen (Tylenol) for pain relief.  Don't take any anti-inflammatory medicines (such as Advil, Motrin, Aleve), unless " "your doctor says it's okay.     You may feel a fullness or aching in your lower belly.  This can feel like the kind of cramps you might get before a period. A back rub may help.     You may need to urinate more.  Your growing uterus and changing hormones can affect your bladder.     You may feel sick to your stomach (morning sickness).  Try avoiding food and smells that make you feel sick.     Your breasts may feel different.  They may feel tender or get bigger. Your nipples may get darker. Try a bra that gives you good support.     Avoid alcohol, tobacco, and drugs (including marijuana).  If you need help quitting, talk to your doctor.     Take a daily prenatal vitamin.  Choose one with folic acid.   Follow-up care is a key part of your treatment and safety. Be sure to make and go to all appointments, and call your doctor if you are having problems. It's also a good idea to know your test results and keep a list of the medicines you take.  Where can you learn more?  Go to https://www.LotLinx.net/patiented  Enter E090 in the search box to learn more about \"Weeks 10 to 14 of Your Pregnancy: Care Instructions.\"  Current as of: April 30, 2024  Content Version: 14.3    2024 [employer].   Care instructions adapted under license by your healthcare professional. If you have questions about a medical condition or this instruction, always ask your healthcare professional. [employer] disclaims any warranty or liability for your use of this information.      Nutrition During Pregnancy: Care Instructions  Overview     Healthy eating when you are pregnant is important for you and your baby. It can help you feel well and have a successful pregnancy and delivery. During pregnancy your nutrition needs increase. Even if you have excellent eating habits, your doctor may recommend a multivitamin to make sure you get enough iron and folic acid.  You may wonder how much weight you should gain. In general, if " you were at a healthy weight before you became pregnant, then you should gain between 25 and 35 pounds. If you were overweight before pregnancy, then you'll likely be advised to gain 15 to 25 pounds. If you were underweight before pregnancy, then you'll probably be advised to gain 28 to 40 pounds. Your doctor will work with you to set a weight goal that is right for you. Gaining a healthy amount of weight helps you have a healthy baby.  Follow-up care is a key part of your treatment and safety. Be sure to make and go to all appointments, and call your doctor if you are having problems. It's also a good idea to know your test results and keep a list of the medicines you take.  How can you care for yourself at home?  Eat plenty of fruits and vegetables. Include a variety of orange, yellow, and leafy dark-green vegetables every day.  Choose whole-grain bread, cereal, and pasta. Good choices include whole wheat bread, whole wheat pasta, brown rice, and oatmeal.  Get 4 or more servings of milk and milk products each day. Good choices include nonfat or low-fat milk, yogurt, and cheese. If you cannot eat milk products, you can get calcium from calcium-fortified products such as orange juice, soy milk, and tofu. Other non-milk sources of calcium include leafy green vegetables, such as broccoli, kale, mustard greens, turnip greens, bok onel, and brussels sprouts.  If you eat meat, pick lower-fat types. Good choices include lean cuts of meat and chicken or turkey without the skin.  Avoid fish that are high in mercury. These include shark, swordfish, felipe mackerel, marlin, orange roughy, and bigeye tuna, as well as tilefish from the [place] [facility] [place].  It's okay to eat up to 8 to 12 ounces a week of fish that are low in mercury or up to 4 ounces a week of fish that have medium levels of mercury. Some fish that are low in mercury are salmon, shrimp, canned light tuna, cod, and tilapia. Some fish that have medium levels of mercury  "are halibut and white albacore tuna.  For more advice about eating fish, you can visit the U.S. Food and Drug Administration (FDA) or U.S. Environmental Protection Agency (EPA) website.  Heat lunch meats (such as turkey, ham, or bologna) to 165 F before you eat them. This reduces your risk of getting sick from a kind of bacteria that can be found in lunch meats.  Do not eat unpasteurized soft cheeses, such as brie, feta, fresh mozzarella, and blue cheese. They have a bacteria that could harm your baby.  Limit caffeine to about 200 to 300 mg per day. On average, a cup of brewed coffee has around 80 to 100 mg of caffeine.  Do not drink any alcohol. No amount of alcohol has been found to be safe during pregnancy.  Do not diet or try to lose weight. For example, do not follow a low-carbohydrate diet. If you are overweight at the start of your pregnancy, your doctor will work with you to manage your weight gain.  Tell your doctor about all vitamins and supplements you take.  When should you call for help?  Watch closely for changes in your health, and be sure to contact your doctor if you have any problems.  Where can you learn more?  Go to https://www.brotips.net/patiented  Enter Y785 in the search box to learn more about \"Nutrition During Pregnancy: Care Instructions.\"  Current as of: September 20, 2023  Content Version: 14.3    2024 [employer].   Care instructions adapted under license by your healthcare professional. If you have questions about a medical condition or this instruction, always ask your healthcare professional. [employer] disclaims any warranty or liability for your use of this information.    Exercise During Pregnancy: Care Instructions  Overview     Exercise is good for you during a healthy pregnancy. It can relieve back pain, swelling, and other discomforts. It also prepares your muscles for childbirth. And exercise can improve your energy level and help you sleep " better.  If your doctor advises it, get more exercise. For example, walking is a good choice. Bit by bit, increase the amount you walk every day. Try for at least 30 minutes on most days of the week. You could also try a prenatal exercise class. But if you do not already exercise, be sure to talk with your doctor before you start a new exercise program. Doctors do not recommend contact sports during pregnancy.  Follow-up care is a key part of your treatment and safety. Be sure to make and go to all appointments, and call your doctor if you are having problems. It's also a good idea to know your test results and keep a list of the medicines you take.  How can you care for yourself at home?  Talk with your doctor about the right kind of exercise for each stage of pregnancy.  Listen to your body to know if your exercise is at a safe level.  Do not become overheated while you exercise. High body temperature can be harmful. Avoid activities that can make your body too hot.  If you feel tired, take it easy. You might walk instead of run.  If you are used to strenuous exercise, ask your doctor how to know when it's time to slow down.  If you exercised before getting pregnant, you should be able to keep up your routine early in your pregnancy. Later in your pregnancy, you may want to switch to more gentle activities.  Drink plenty of fluids before, during, and after exercise.  Avoid contact sports, such as soccer and basketball. Also avoid risky activities. These include scuba diving, horseback riding, and exercising at a high altitude (above 6,000 feet). If you live in a place with a high altitude, talk to your doctor about how you can exercise safely.  Do not get overtired while you exercise. You should be able to talk while you work out.  After your fourth month of pregnancy, avoid exercises that require you to lie flat on your back on a hard surface. These include sit-ups and some yoga poses.  Get plenty of rest. You  "may be very tired while you are pregnant.  Where can you learn more?  Go to https://www.Sprig.net/patiented  Enter S801 in the search box to learn more about \"Exercise During Pregnancy: Care Instructions.\"  Current as of: April 30, 2024  Content Version: 14.3    2024 [employer].   Care instructions adapted under license by your healthcare professional. If you have questions about a medical condition or this instruction, always ask your healthcare professional. [employer] disclaims any warranty or liability for your use of this information.    Learning About Pregnancy When You Are Underweight or Overweight  How does your weight affect your pregnancy?    The basics of prenatal care are the same for everyone, regardless of size. You'll get what you need to have a healthy baby.  But if you are not at a weight that is healthy for you, it can make a difference in a few things. Being underweight or overweight can increase the chances of some problems during pregnancy. So your doctor or midwife will pay close attention to your health and your baby's health. You may have some extra doctor or midwife visits and tests. And you may have some tests earlier in your pregnancy.  Work with your doctor or midwife to get the care you need. Go to all your doctor or midwife visits, and follow their advice about what to do and what to avoid during pregnancy.  How much weight gain is healthy during pregnancy?  There's no fixed number of pounds that you should be aiming for. Instead, there's a range of weight gain that's good for you and your baby. Based on your weight before pregnancy, experts say it's generally best to gain about:  28 lb (13 kg) to 40 lb (18 kg) if you're underweight.  25 lb (11 kg) to 35 lb (16 kg) if you're at a healthy weight.  15 lb (7 kg) to 25 lb (11 kg) if you're overweight.  11 lb (5 kg) to 20 lb (9 kg) if you're very overweight (obese). In some cases, a doctor may recommend that you " "don't gain any weight.  If you have questions about weight gain during pregnancy, talk with your doctor about what's right for you. Gaining a healthy amount of weight helps you have a healthy pregnancy.  How much extra food do you need to eat?  How much food you need to eat during pregnancy depends on:  Your height.  How much you weigh when you get pregnant.  How active you are.  If you're carrying more than one fetus (multiple pregnancy).  In the first trimester, you'll probably need the same amount of calories as you did before you were pregnant. In general, in your second trimester, you need to eat about 340 extra calories a day. In your third trimester, you need to eat about 450 extra calories a day.  What can you do to have a healthy pregnancy?  The best things you can do for you and your baby are to eat healthy foods, get regular exercise, avoid alcohol and smoking, and go to your doctor or midwife visits.  Eat a variety of foods from all the food groups. Make sure to get enough calcium and folic acid. Ask your doctor or midwife how much folic acid you should be taking.  You may want to work with a dietitian to help you plan healthy meals to get the right amount of calories for you.  Talk to your doctor or midwife about how you can exercise safely. If you didn't exercise much before you got pregnant, talk to your doctor or midwife about how you can slowly get more active. They may want to set up an exercise program with you.  Where can you learn more?  Go to https://www.[employer].net/patiented  Enter B644 in the search box to learn more about \"Learning About Pregnancy When You Are Underweight or Overweight.\"  Current as of: April 30, 2024  Content Version: 14.3    2024 Geneva Mars.   Care instructions adapted under license by your healthcare professional. If you have questions about a medical condition or this instruction, always ask your healthcare professional. Geneva [employer] disclaims " any warranty or liability for your use of this information.    You have been provided the CDC Warning Signs in Pregnancy document.    Additional copies can be found here: www.[employer].[email or web address].pdf

## 2025-02-02 LAB
ABO + RH BLD: NORMAL
BLD GP AB SCN SERPL QL: NEGATIVE
SPECIMEN EXP DATE BLD: NORMAL

## 2025-02-02 ASSESSMENT — EDINBURGH POSTNATAL DEPRESSION SCALE (EPDS)
I HAVE BEEN ANXIOUS OR WORRIED FOR NO GOOD REASON: NO, NOT AT ALL
I HAVE LOOKED FORWARD WITH ENJOYMENT TO THINGS: AS MUCH AS I EVER DID
I HAVE BEEN SO UNHAPPY THAT I HAVE BEEN CRYING: NO, NEVER
I HAVE FELT SCARED OR PANICKY FOR NO GOOD REASON: NO, NOT AT ALL
I HAVE BEEN SO UNHAPPY THAT I HAVE HAD DIFFICULTY SLEEPING: NOT AT ALL
I HAVE BLAMED MYSELF UNNECESSARILY WHEN THINGS WENT WRONG: YES, SOME OF THE TIME
I HAVE BLAMED MYSELF UNNECESSARILY WHEN THINGS WENT WRONG: YES, SOME OF THE TIME
TOTAL SCORE: 4
I HAVE FELT SCARED OR PANICKY FOR NO GOOD REASON: NO, NOT AT ALL
I HAVE BEEN ABLE TO LAUGH AND SEE THE FUNNY SIDE OF THINGS: AS MUCH AS I ALWAYS COULD
I HAVE BEEN ABLE TO LAUGH AND SEE THE FUNNY SIDE OF THINGS: AS MUCH AS I ALWAYS COULD
I HAVE BEEN SO UNHAPPY THAT I HAVE BEEN CRYING: NO, NEVER
I HAVE FELT SAD OR MISERABLE: NO, NOT AT ALL
THINGS HAVE BEEN GETTING ON TOP OF ME: YES, SOMETIMES I HAVEN'T BEEN COPING AS WELL AS USUAL
I HAVE BEEN SO UNHAPPY THAT I HAVE HAD DIFFICULTY SLEEPING: NOT AT ALL
THINGS HAVE BEEN GETTING ON TOP OF ME: YES, SOMETIMES I HAVEN'T BEEN COPING AS WELL AS USUAL
I HAVE BEEN ANXIOUS OR WORRIED FOR NO GOOD REASON: NO, NOT AT ALL
THE THOUGHT OF HARMING MYSELF HAS OCCURRED TO ME: NEVER
I HAVE FELT SAD OR MISERABLE: NO, NOT AT ALL
I HAVE LOOKED FORWARD WITH ENJOYMENT TO THINGS: AS MUCH AS I EVER DID
THE THOUGHT OF HARMING MYSELF HAS OCCURRED TO ME: NEVER

## 2025-02-02 ASSESSMENT — PATIENT HEALTH QUESTIONNAIRE - PHQ9
SUM OF ALL RESPONSES TO PHQ QUESTIONS 1-9: 4
SUM OF ALL RESPONSES TO PHQ QUESTIONS 1-9: 4
10. IF YOU CHECKED OFF ANY PROBLEMS, HOW DIFFICULT HAVE THESE PROBLEMS MADE IT FOR YOU TO DO YOUR WORK, TAKE CARE OF THINGS AT HOME, OR GET ALONG WITH OTHER PEOPLE: SOMEWHAT DIFFICULT

## 2025-02-03 ENCOUNTER — PRENATAL OFFICE VISIT (OUTPATIENT)
Dept: MIDWIFE SERVICES | Facility: CLINIC | Age: 35
End: 2025-02-03
Attending: ADVANCED PRACTICE MIDWIFE

## 2025-02-03 ENCOUNTER — ANCILLARY PROCEDURE (OUTPATIENT)
Dept: ULTRASOUND IMAGING | Facility: CLINIC | Age: 35
End: 2025-02-03
Attending: ADVANCED PRACTICE MIDWIFE

## 2025-02-03 ENCOUNTER — TRANSCRIBE ORDERS (OUTPATIENT)
Dept: MATERNAL FETAL MEDICINE | Facility: CLINIC | Age: 35
End: 2025-02-03

## 2025-02-03 VITALS
WEIGHT: 160.4 LBS | OXYGEN SATURATION: 100 % | BODY MASS INDEX: 26.69 KG/M2 | HEART RATE: 81 BPM | DIASTOLIC BLOOD PRESSURE: 69 MMHG | SYSTOLIC BLOOD PRESSURE: 100 MMHG

## 2025-02-03 DIAGNOSIS — O26.90 PREGNANCY RELATED CONDITION, ANTEPARTUM: Primary | ICD-10-CM

## 2025-02-03 DIAGNOSIS — O09.529 ENCOUNTER FOR SUPERVISION OF MULTIGRAVIDA WITH ADVANCED MATERNAL AGE: Primary | ICD-10-CM

## 2025-02-03 DIAGNOSIS — O21.9 NAUSEA/VOMITING IN PREGNANCY: ICD-10-CM

## 2025-02-03 DIAGNOSIS — O09.529 ENCOUNTER FOR SUPERVISION OF MULTIGRAVIDA WITH ADVANCED MATERNAL AGE: ICD-10-CM

## 2025-02-03 LAB
ALBUMIN UR-MCNC: NEGATIVE MG/DL
APPEARANCE UR: CLEAR
BILIRUB UR QL STRIP: NEGATIVE
COLOR UR AUTO: YELLOW
ERYTHROCYTE [DISTWIDTH] IN BLOOD BY AUTOMATED COUNT: 12.3 % (ref 10–15)
EST. AVERAGE GLUCOSE BLD GHB EST-MCNC: 103 MG/DL
GLUCOSE UR STRIP-MCNC: NEGATIVE MG/DL
HBA1C MFR BLD: 5.2 % (ref 0–5.6)
HBV SURFACE AG SERPL QL IA: NONREACTIVE
HCT VFR BLD AUTO: 36.8 % (ref 35–47)
HCV AB SERPL QL IA: NONREACTIVE
HGB BLD-MCNC: 12.3 G/DL (ref 11.7–15.7)
HGB UR QL STRIP: NEGATIVE
HIV 1+2 AB+HIV1 P24 AG SERPL QL IA: NONREACTIVE
KETONES UR STRIP-MCNC: NEGATIVE MG/DL
LEUKOCYTE ESTERASE UR QL STRIP: ABNORMAL
MCH RBC QN AUTO: 29.8 PG (ref 26.5–33)
MCHC RBC AUTO-ENTMCNC: 33.4 G/DL (ref 31.5–36.5)
MCV RBC AUTO: 89 FL (ref 78–100)
NITRATE UR QL: NEGATIVE
PH UR STRIP: 7.5 [PH] (ref 5–7)
PLATELET # BLD AUTO: 190 10E3/UL (ref 150–450)
RBC # BLD AUTO: 4.13 10E6/UL (ref 3.8–5.2)
RUBV IGG SERPL QL IA: 10.7 INDEX
RUBV IGG SERPL QL IA: POSITIVE
SP GR UR STRIP: 1.01 (ref 1–1.03)
T PALLIDUM AB SER QL: NONREACTIVE
UROBILINOGEN UR STRIP-ACNC: 0.2 E.U./DL
WBC # BLD AUTO: 4.5 10E3/UL (ref 4–11)

## 2025-02-03 PROCEDURE — 86901 BLOOD TYPING SEROLOGIC RH(D): CPT | Performed by: ADVANCED PRACTICE MIDWIFE

## 2025-02-03 PROCEDURE — 87389 HIV-1 AG W/HIV-1&-2 AB AG IA: CPT | Performed by: ADVANCED PRACTICE MIDWIFE

## 2025-02-03 PROCEDURE — 87088 URINE BACTERIA CULTURE: CPT | Performed by: ADVANCED PRACTICE MIDWIFE

## 2025-02-03 PROCEDURE — 86850 RBC ANTIBODY SCREEN: CPT | Performed by: ADVANCED PRACTICE MIDWIFE

## 2025-02-03 PROCEDURE — 87340 HEPATITIS B SURFACE AG IA: CPT | Performed by: ADVANCED PRACTICE MIDWIFE

## 2025-02-03 PROCEDURE — 86900 BLOOD TYPING SEROLOGIC ABO: CPT | Performed by: ADVANCED PRACTICE MIDWIFE

## 2025-02-03 PROCEDURE — 83036 HEMOGLOBIN GLYCOSYLATED A1C: CPT | Performed by: ADVANCED PRACTICE MIDWIFE

## 2025-02-03 PROCEDURE — 81003 URINALYSIS AUTO W/O SCOPE: CPT | Performed by: ADVANCED PRACTICE MIDWIFE

## 2025-02-03 PROCEDURE — 86762 RUBELLA ANTIBODY: CPT | Performed by: ADVANCED PRACTICE MIDWIFE

## 2025-02-03 PROCEDURE — 87086 URINE CULTURE/COLONY COUNT: CPT | Performed by: ADVANCED PRACTICE MIDWIFE

## 2025-02-03 PROCEDURE — 76801 OB US < 14 WKS SINGLE FETUS: CPT | Performed by: OBSTETRICS & GYNECOLOGY

## 2025-02-03 PROCEDURE — 85027 COMPLETE CBC AUTOMATED: CPT | Performed by: ADVANCED PRACTICE MIDWIFE

## 2025-02-03 PROCEDURE — 99213 OFFICE O/P EST LOW 20 MIN: CPT | Performed by: ADVANCED PRACTICE MIDWIFE

## 2025-02-03 PROCEDURE — 36415 COLL VENOUS BLD VENIPUNCTURE: CPT | Performed by: ADVANCED PRACTICE MIDWIFE

## 2025-02-03 PROCEDURE — 86780 TREPONEMA PALLIDUM: CPT | Performed by: ADVANCED PRACTICE MIDWIFE

## 2025-02-03 PROCEDURE — 86803 HEPATITIS C AB TEST: CPT | Performed by: ADVANCED PRACTICE MIDWIFE

## 2025-02-03 RX ORDER — METOCLOPRAMIDE 5 MG/1
5 TABLET ORAL
Qty: 90 TABLET | Refills: 1 | Status: SHIPPED | OUTPATIENT
Start: 2025-02-03
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
## 2025-02-04 LAB
BACTERIA UR CULT: ABNORMAL
BACTERIA UR CULT: ABNORMAL

## 2025-02-05 DIAGNOSIS — B95.1 GBS (GROUP B STREPTOCOCCUS) UTI COMPLICATING PREGNANCY, FIRST TRIMESTER: Primary | ICD-10-CM

## 2025-02-05 DIAGNOSIS — O23.41 GBS (GROUP B STREPTOCOCCUS) UTI COMPLICATING PREGNANCY, FIRST TRIMESTER: Primary | ICD-10-CM

## 2025-02-05 RX ORDER — AMOXICILLIN 875 MG/1
875 TABLET, COATED ORAL 2 TIMES DAILY
Qty: 10 TABLET | Refills: 0 | Status: SHIPPED | OUTPATIENT
Start: 2025-02-05 | End: 2025-02-10